# Patient Record
Sex: FEMALE | Race: WHITE | NOT HISPANIC OR LATINO | Employment: OTHER | URBAN - METROPOLITAN AREA
[De-identification: names, ages, dates, MRNs, and addresses within clinical notes are randomized per-mention and may not be internally consistent; named-entity substitution may affect disease eponyms.]

---

## 2018-03-14 ENCOUNTER — OFFICE VISIT (OUTPATIENT)
Dept: INTERNAL MEDICINE | Facility: CLINIC | Age: 73
End: 2018-03-14

## 2018-03-14 VITALS
WEIGHT: 131 LBS | HEIGHT: 62 IN | RESPIRATION RATE: 16 BRPM | SYSTOLIC BLOOD PRESSURE: 126 MMHG | TEMPERATURE: 97.9 F | HEART RATE: 68 BPM | DIASTOLIC BLOOD PRESSURE: 68 MMHG | BODY MASS INDEX: 24.11 KG/M2

## 2018-03-14 DIAGNOSIS — Z00.00 HEALTHCARE MAINTENANCE: ICD-10-CM

## 2018-03-14 DIAGNOSIS — M41.9 SCOLIOSIS OF THORACOLUMBAR SPINE, UNSPECIFIED SCOLIOSIS TYPE: Primary | ICD-10-CM

## 2018-03-14 LAB
ARTICHOKE IGE QN: 81 MG/DL (ref 0–130)
BILIRUB BLD-MCNC: NEGATIVE MG/DL
CHOLEST SERPL-MCNC: 170 MG/DL (ref 0–200)
CLARITY, POC: CLEAR
COLOR UR: YELLOW
EXPIRATION DATE: NORMAL
GLUCOSE UR STRIP-MCNC: NEGATIVE MG/DL
HDLC SERPL-MCNC: 84 MG/DL (ref 40–60)
KETONES UR QL: NEGATIVE
LEUKOCYTE EST, POC: NEGATIVE
Lab: NORMAL
NITRITE UR-MCNC: NEGATIVE MG/ML
PH UR: 6.5 [PH] (ref 5–8)
PROT UR STRIP-MCNC: NEGATIVE MG/DL
RBC # UR STRIP: NEGATIVE /UL
SP GR UR: 1 (ref 1–1.03)
TRIGL SERPL-MCNC: 39 MG/DL (ref 0–150)
UROBILINOGEN UR QL: NORMAL

## 2018-03-14 PROCEDURE — 36415 COLL VENOUS BLD VENIPUNCTURE: CPT | Performed by: INTERNAL MEDICINE

## 2018-03-14 PROCEDURE — 80061 LIPID PANEL: CPT | Performed by: INTERNAL MEDICINE

## 2018-03-14 PROCEDURE — 99203 OFFICE O/P NEW LOW 30 MIN: CPT | Performed by: INTERNAL MEDICINE

## 2018-03-14 PROCEDURE — 81003 URINALYSIS AUTO W/O SCOPE: CPT | Performed by: INTERNAL MEDICINE

## 2018-03-14 RX ORDER — LUBIPROSTONE 24 UG/1
1 CAPSULE, GELATIN COATED ORAL 2 TIMES DAILY
COMMUNITY
Start: 2018-02-01

## 2018-03-14 RX ORDER — DOXAZOSIN 2 MG/1
1 TABLET ORAL NIGHTLY
COMMUNITY
Start: 2018-02-06 | End: 2018-08-27 | Stop reason: SDUPTHER

## 2018-03-14 RX ORDER — GABAPENTIN 100 MG/1
CAPSULE ORAL
COMMUNITY
Start: 2018-01-16 | End: 2022-04-11

## 2018-03-14 RX ORDER — PANTOPRAZOLE SODIUM 40 MG/1
1 TABLET, DELAYED RELEASE ORAL DAILY
COMMUNITY
Start: 2018-03-01 | End: 2019-04-23 | Stop reason: SDUPTHER

## 2018-03-14 RX ORDER — MONTELUKAST SODIUM 10 MG/1
1 TABLET ORAL DAILY PRN
COMMUNITY
Start: 2018-01-15 | End: 2018-09-04 | Stop reason: SDUPTHER

## 2018-03-19 NOTE — PROGRESS NOTES
Chief Complaint   Patient presents with   • NEW PATIENT, ESTABLISH CARE       History of Present Illness    The patient presents for follow-up of mid back pain. She reports that the pain is worsened. The current treatment consists of no specific therapies. She has a history of scoliosis.   She does not have associated fever, chills, weight loss, rashes, dysuria, hematuria, or a decreased urine stream. The pain does not radiate. There is no numbness. The patient denies loss of bladder control. The patient denies loss of bowel control. The patient has attempted heat, ice, exercise, stretches and Non-Steroidals. The heat did give pain relief. The ice did give pain relief. The exercise and stretching did give pain relief. The Non Steroidal AntiInflammatories did give relief of the pain. The patient reports no aggravating factors.    Review of Systems    GENERAL- Denies Fever, Chills, Sweats, Weakness or Malaise.    HEENT- Denies Eye Pain, Eye Drainage, Nasal Discharge, Sore Throat, Ear Pain, Ear Drainage, Headache, Decreased Vision, Sinus Pain, Nasal Congestion, Decreased Hearing, Visual Disturbance, Diplopia or Tinnitus.    GASTROINTESTINAL- Denies Abdominal Pain, Nausea, Vomiting, Diarrhea, Blood per Rectum, Constipation or Heartburn.    PULMONARY- Denies Wheezing, Dyspnea, Sputum Production, Cough, Hemoptysis or Pleuritic Chest Pain.    ENDOCRINE- Denies Cold Intolerance, Depression, Edema, Fatigue, Hair Loss, Heat Intolerance, Memory Loss, Palpitations, Polydypsia, Polyphagia, Polyuria, Sleep Disturbance, Weight Gain or Weight Loss.    PSYCHIATRIC- Denies Anxiety, Loneliness, Tearfulness, Hopelessness or Suicidal Ideation.    Medications      Current Outpatient Prescriptions:   •  acetaminophen (TYLENOL) 650 MG 8 hr tablet, Take 650 mg by mouth every 8 (eight) hours as needed for mild pain (1-3)., Disp: , Rfl:   •  AMITIZA 24 MCG capsule, Take 1 capsule by mouth 2 (Two) Times a Day., Disp: , Rfl:   •   Calcium-Magnesium-Vitamin D (CALCIUM MAGNESIUM PO), Take  by mouth., Disp: , Rfl:   •  DHA-EPA-Vitamin E (OMEGA-3 COMPLEX PO), Take 1 capsule by mouth Daily., Disp: , Rfl:   •  diclofenac (VOLTAREN) 1 % gel gel, Apply 4 g topically 4 (four) times a day as needed., Disp: , Rfl:   •  Dietary Management Product (VASCULERA PO), Take 1 tablet by mouth Daily., Disp: , Rfl:   •  Diphenhydramine-APAP, sleep, (TYLENOL PM EXTRA STRENGTH PO), Take 1 tablet by mouth At Night As Needed., Disp: , Rfl:   •  doxazosin (CARDURA) 2 MG tablet, Take 1 tablet by mouth Every Night., Disp: , Rfl:   •  famotidine (PEPCID) 20 MG tablet, Take 20 mg by mouth 2 (Two) Times a Day As Needed., Disp: , Rfl:   •  fluticasone (FLONASE) 50 MCG/ACT nasal spray, 2 sprays into each nostril Daily As Needed., Disp: , Rfl:   •  gabapentin (NEURONTIN) 100 MG capsule, TAKE 2 CAPSULES AT NOON AND 3 CAPSULES AT HS, Disp: , Rfl:   •  Loratadine-Pseudoephedrine (CLARITIN-D 12 HOUR PO), Take 1 tablet by mouth Daily As Needed., Disp: , Rfl:   •  montelukast (SINGULAIR) 10 MG tablet, Take 1 tablet by mouth Daily As Needed., Disp: , Rfl:   •  pantoprazole (PROTONIX) 40 MG EC tablet, Take 1 tablet by mouth Daily., Disp: , Rfl:   •  polyethylene glycol (MIRALAX) powder, Take 17 g by mouth Daily As Needed (constipation)., Disp: , Rfl:   •  Probiotic Product (PROBIOTIC DAILY PO), Take 1 tablet by mouth Daily., Disp: , Rfl:   •  Raloxifene HCl (EVISTA PO), Take 1 tablet by mouth Daily., Disp: , Rfl:   •  VITAMIN D, CHOLECALCIFEROL, PO, Take  by mouth., Disp: , Rfl:   •  Wheat Dextrin (BENEFIBER DRINK MIX PO), Take  by mouth., Disp: , Rfl:      Allergies    Allergies   Allergen Reactions   • Codeine GI Intolerance       Problem List    Patient Active Problem List   Diagnosis   • Rectal pain   • S/P hemorrhoidectomy 11/1/16   • Urinary retention   • Acute urinary retention       Medications, Allergies, Problems List and Past History were reviewed and updated.    Physical  "Examination    /68 (BP Location: Left arm, Patient Position: Sitting, Cuff Size: Adult)   Pulse 68   Temp 97.9 °F (36.6 °C) (Temporal Artery )   Resp 16   Ht 156.2 cm (61.5\")   Wt 59.4 kg (131 lb)   LMP  (LMP Unknown) Comment: LAST PAP 2015  Breastfeeding? No   BMI 24.35 kg/m²     HEENT: Head- Normocephalic Atraumatic. Facies- Within normal limits. Pinnas- Normal texture and shape bilaterally. Canals- Normal bilaterally. TMs- Normal bilaterally. Nares- Patent bilaterally. Nasal Septum- is normal. There is no tenderness to palpation over the frontal or maxillary sinuses. Lids- Normal bilaterally. Conjunctiva- Clear bilaterally. Sclera- Anicteric bilaterally. Oropharynx- Moist with no lesions. Tonsils- No enlargement, erythema or exudate.    Lungs: Auscultation- Clear to auscultation bilaterally. There are no retractions, clubbing or cyanosis. The Expiratory to Inspiratory ratio is equal.    Cardiovascular: There are no carotid bruits. Heart- Normal Rate with Regular rhythm and no murmurs. There are no gallops. There are no rubs. In the lower extremities there is no edema. The upper extremities do not have edema.    Abdomen: Soft, benign, non-tender with no masses, hernias, organomegaly or scars.    Back: The patient has a normal spinal curvature with no evidence of scoliosis. There are no skin lesions noted on the back. Palpation reveals no tenderness and normal muscle tone. The straight leg raising test is negative. The back has normal flexion, extension, rotation, and lateral bending.    Lower Extremity Neuro Exam: Muscle Strength is 5/5 in all major lower extremity muscle groups. Deep Tendon Reflexes are 2+ and equal in the patellar and Achilles distributions bilaterally. Sensation is normal in all distributions.    Breast: Normal contours bilaterally with no masses, discharge, skin changes or lumps. There are no scars noted.    Genitourinary: The labia are within normal limits with no lesions. The " vaginal introitus is within normal limits. The vagina has a healthy mucosa with no lesions or discharge. The cervix is multiparous with no lesions. The bimanual examination reveals no adnexal masses or tenderness.    Impression and Assessment    Low Back Pain.    Gynecologic Exam.    Plan    Low Back Pain Plan: She was referred to physical therapy.    Gynecologic Exam Plan:  A pap smear was obtained.    The following was ordered for screening and health maintenance: Lipid Profile and U/A.    Brandi was seen today for new patient, establish care.    Diagnoses and all orders for this visit:    Scoliosis of thoracolumbar spine, unspecified scoliosis type  -     Ambulatory Referral to Physical Therapy Evaluate and treat    Healthcare maintenance  -     Lipid Panel  -     POC Urinalysis Dipstick, Automated        Return to Office    The patient was instructed to return for follow-up in 3 months.    The patient was instructed to return sooner if the condition changes, worsens, or doesn't resolve.

## 2018-04-18 ENCOUNTER — OFFICE VISIT (OUTPATIENT)
Dept: INTERNAL MEDICINE | Facility: CLINIC | Age: 73
End: 2018-04-18

## 2018-04-18 VITALS
WEIGHT: 128 LBS | TEMPERATURE: 97.3 F | RESPIRATION RATE: 18 BRPM | DIASTOLIC BLOOD PRESSURE: 64 MMHG | BODY MASS INDEX: 23.79 KG/M2 | SYSTOLIC BLOOD PRESSURE: 122 MMHG | HEART RATE: 80 BPM

## 2018-04-18 DIAGNOSIS — K62.89 RECTAL PAIN: ICD-10-CM

## 2018-04-18 DIAGNOSIS — K64.9 HEMORRHOIDS, UNSPECIFIED HEMORRHOID TYPE: ICD-10-CM

## 2018-04-18 DIAGNOSIS — L03.012 PARONYCHIA OF FINGER OF LEFT HAND: Primary | ICD-10-CM

## 2018-04-18 PROCEDURE — 99214 OFFICE O/P EST MOD 30 MIN: CPT | Performed by: INTERNAL MEDICINE

## 2018-04-18 RX ORDER — LIDOCAINE 5 G/100G
CREAM RECTAL; TOPICAL 4 TIMES DAILY PRN
COMMUNITY

## 2018-04-18 RX ORDER — SULFAMETHOXAZOLE AND TRIMETHOPRIM 800; 160 MG/1; MG/1
1 TABLET ORAL 2 TIMES DAILY
Qty: 20 TABLET | Refills: 0 | Status: SHIPPED | OUTPATIENT
Start: 2018-04-18 | End: 2018-06-14

## 2018-04-18 NOTE — PROGRESS NOTES
Chief Complaint   Patient presents with   • SORE, RED CUTICLE ON LEFT RING FINGER   • Rectal Pain       History of Present Illness      The patient presents with erythema, tenderness, swelling, warmth and pain around the fourth fingernail of the left hand. She denies chills, rigors, malaise or tachycardia. This has been present for 5 days. The patient denies a fever. She does not have diabetes, obesity, hyperhidrosis, immune defects or medication-induced immunosuppression. She does not chew her fingernails. This began after she was nicked at a nail salon while having her nails clipped.    The patient has a one month history of rectal pain. There is no history of gastrointestinal bleeding. The patient denies a change in bowel habits. The stool caliber is normal. The patient denies diarrhea. There has not been constipation. The patient has not had a fever. The patient denies emesis. The patient denies abdominal pain. There has not been any unexplained weight loss. The patient denies dysuria, pelvic pain or a rash. The patient reports hemorrhoids. There is no vaginal discharge. She is using 5% lidocaine ointment without relief. She has an appointment with her colorectal surgeon next week.    Medications      Current Outpatient Prescriptions:   •  acetaminophen (TYLENOL) 650 MG 8 hr tablet, Take 1,300 mg by mouth Every 8 (Eight) Hours As Needed for Mild Pain ., Disp: , Rfl:   •  AMITIZA 24 MCG capsule, Take 1 capsule by mouth 2 (Two) Times a Day., Disp: , Rfl:   •  Calcium-Magnesium-Vitamin D (CALCIUM MAGNESIUM PO), Take  by mouth., Disp: , Rfl:   •  custom compounded cream, Apply  topically. CUSTOM COMPOUNDED RECTAL SUPPOSITORIES BELLADONNA/MORPHINE SULF 16.2/5MG  INSERT 1 SUPPOSITORY TWICE DAILY AS NEEDED, Disp: , Rfl:   •  custom compounded cream, Apply  topically. CUSTOM COMPOUNDED RECTAL SUPPOSITORY HYDROCORTISONE/LIDOCAINE 2.5% / 2% INSERT 1 SUPPOSITORY TWICE DAILY AS NEEDED, Disp: , Rfl:   •  DHA-EPA-Vitamin E  (OMEGA-3 COMPLEX PO), Take 1 capsule by mouth Daily., Disp: , Rfl:   •  diclofenac (VOLTAREN) 1 % gel gel, Apply 4 g topically 4 (four) times a day as needed., Disp: , Rfl:   •  Dietary Management Product (VASCULERA PO), Take 1 tablet by mouth Daily., Disp: , Rfl:   •  Diphenhydramine-APAP, sleep, (TYLENOL PM EXTRA STRENGTH PO), Take 2 tablets by mouth At Night As Needed., Disp: , Rfl:   •  doxazosin (CARDURA) 2 MG tablet, Take 1 tablet by mouth Every Night., Disp: , Rfl:   •  famotidine (PEPCID) 20 MG tablet, Take 20 mg by mouth 2 (Two) Times a Day As Needed., Disp: , Rfl:   •  fluticasone (FLONASE) 50 MCG/ACT nasal spray, 2 sprays into each nostril Daily As Needed., Disp: , Rfl:   •  gabapentin (NEURONTIN) 100 MG capsule, TAKE 2 CAPSULES AT NOON AND 3 CAPSULES AT HS, Disp: , Rfl:   •  Lidocaine, Anorectal, (RECTICARE) 5 % cream cream, Apply  topically 4 (Four) Times a Day As Needed., Disp: , Rfl:   •  Loratadine-Pseudoephedrine (CLARITIN-D 12 HOUR PO), Take 1 tablet by mouth Daily As Needed., Disp: , Rfl:   •  montelukast (SINGULAIR) 10 MG tablet, Take 1 tablet by mouth Daily As Needed., Disp: , Rfl:   •  pantoprazole (PROTONIX) 40 MG EC tablet, Take 1 tablet by mouth Daily., Disp: , Rfl:   •  polyethylene glycol (MIRALAX) powder, Take 17 g by mouth Daily As Needed (constipation)., Disp: , Rfl:   •  Probiotic Product (PROBIOTIC DAILY PO), Take 1 tablet by mouth Daily., Disp: , Rfl:   •  Raloxifene HCl (EVISTA PO), Take 1 tablet by mouth Daily., Disp: , Rfl:   •  VITAMIN D, CHOLECALCIFEROL, PO, Take  by mouth., Disp: , Rfl:   •  Wheat Dextrin (BENEFIBER DRINK MIX PO), Take  by mouth., Disp: , Rfl:      Allergies    No Known Allergies    Problem List    Patient Active Problem List   Diagnosis   • Rectal pain   • S/P hemorrhoidectomy 11/1/16   • Urinary retention   • Acute urinary retention       Medications, Allergies, Problems List and Past History were reviewed and updated.    Physical Examination    /64 (BP  Location: Left arm, Patient Position: Sitting, Cuff Size: Adult)   Pulse 80   Temp 97.3 °F (36.3 °C) (Temporal Artery )   Resp 18   Wt 58.1 kg (128 lb)   LMP  (LMP Unknown) Comment: LAST PAP 2015  Breastfeeding? No   BMI 23.79 kg/m²     Abdomen: Soft, benign, non-tender with no masses, hernias, organomegaly or scars.    Rectal: reveals normal external sphincter tone with thrombosed hemorrhoids.    Hands: The left hand has normal brian landmarks and no noted atrophy. There is no evidence of tenosynovitis. There is no tenderness noted in the hands. There are no ingrown nails. The nail beds are are noted to have erythema, tenderness, swelling, warmth and pain to palpation around the fourth fingernail of the left hand.    Impression and Assessment    Paronychia.    Rectal Pain.    Hemorrhoids.    Plan    Rectal Pain Plan: The patient was encouraged to take Sitz baths daily. She was instructed to use stool softeners daily. She will follow-up with her surgeon. Medication will be added as noted below.    Hemorrhoids Plan: The patient was encouraged to take Sitz baths daily. She was instructed to use stool softeners daily. Medication will be added as noted below.    Paronychia Plan: Medication was added as noted below.    Brandi was seen today for sore, red cuticle on left ring finger and rectal pain.    Diagnoses and all orders for this visit:    Paronychia of finger of left hand  -     sulfamethoxazole-trimethoprim (BACTRIM DS) 800-160 MG per tablet; Take 1 tablet by mouth 2 (Two) Times a Day.    Rectal pain  -     hydrocortisone (PROCTOCORT) 1 % cream rectal cream; Insert  into the rectum 2 (Two) Times a Day.    Hemorrhoids, unspecified hemorrhoid type  -     hydrocortisone (PROCTOCORT) 1 % cream rectal cream; Insert  into the rectum 2 (Two) Times a Day.        Return to Office    The patient was instructed to return for follow-up at the next scheduled visit.    The patient was instructed to return sooner if the  condition changes, worsens, or doesn't resolve.

## 2018-06-14 ENCOUNTER — OFFICE VISIT (OUTPATIENT)
Dept: INTERNAL MEDICINE | Facility: CLINIC | Age: 73
End: 2018-06-14

## 2018-06-14 VITALS
RESPIRATION RATE: 16 BRPM | SYSTOLIC BLOOD PRESSURE: 126 MMHG | DIASTOLIC BLOOD PRESSURE: 74 MMHG | TEMPERATURE: 98.1 F | HEART RATE: 72 BPM | WEIGHT: 131 LBS | BODY MASS INDEX: 24.35 KG/M2

## 2018-06-14 DIAGNOSIS — M81.0 OSTEOPOROSIS, UNSPECIFIED OSTEOPOROSIS TYPE, UNSPECIFIED PATHOLOGICAL FRACTURE PRESENCE: ICD-10-CM

## 2018-06-14 DIAGNOSIS — Z23 NEED FOR PNEUMOCOCCAL VACCINE: ICD-10-CM

## 2018-06-14 DIAGNOSIS — Z12.83 SKIN EXAM, SCREENING FOR CANCER: ICD-10-CM

## 2018-06-14 DIAGNOSIS — K21.9 GASTROESOPHAGEAL REFLUX DISEASE WITHOUT ESOPHAGITIS: Primary | ICD-10-CM

## 2018-06-14 DIAGNOSIS — Z12.11 SCREENING FOR COLON CANCER: ICD-10-CM

## 2018-06-14 DIAGNOSIS — K59.00 CONSTIPATION, UNSPECIFIED CONSTIPATION TYPE: ICD-10-CM

## 2018-06-14 PROCEDURE — 90670 PCV13 VACCINE IM: CPT | Performed by: INTERNAL MEDICINE

## 2018-06-14 PROCEDURE — 99214 OFFICE O/P EST MOD 30 MIN: CPT | Performed by: INTERNAL MEDICINE

## 2018-06-14 PROCEDURE — G0009 ADMIN PNEUMOCOCCAL VACCINE: HCPCS | Performed by: INTERNAL MEDICINE

## 2018-06-14 NOTE — PROGRESS NOTES
Chief Complaint   Patient presents with   • Follow-up     3 MONTH FOLLOW UP CHRONIC MEDICAL PROBLEMS       History of Present Illness    The patient presents for a follow-up related to GERD. The patient is not on medication for her gastroesophageal reflux. She reports no abdominal pain, belching, chest pain, diarrhea, dysphagia, early satiety, heartburn, hoarseness, nausea, odynophagia, rectal bleeding, vomiting or weight loss. The GERD has no known aggravating factors.    The patient presents for follow-up of osteoporosis. She reports no excessive caffeine intake. The patient does not smoke. The patient is currently taking a medication for osteoporosis. She does not take supplemental calcium. The last DEXA scan was in 2017 and revealed osteoporosis.    The patient presents for follow-up of constipation. The constipation has remained the same. The patient has been compliant with treatment as outlined at the last visit. The patient's bowel movements occur approximately every day. The stools are soft. Bowel Movements are not painful. There is no associated enuresis. The patient does not have abdominal pain. The patient denies neglecting the urge or putting off bowel movements.    Review of Systems    GENERAL/CONSTITUTIONAL- Denies Fever, Chills, Sweats, Fatigue, Weakness or Malaise.    CARDIOVASCULAR- Denies Claudication, Edema, Syncope or Palpitations.    GASTROINTESTINAL- Reports: Constipation..    PULMONARY- Denies Wheezing, Dyspnea, Sputum Production, Cough, Hemoptysis or Pleuritic Chest Pain.    MUSCULOSKELETAL- Denies Joint Pain, Joint Stiffness, Decreased Range of Motion, Joint Swelling or Erythema of Joints.    Medications      Current Outpatient Prescriptions:   •  acetaminophen (TYLENOL) 650 MG 8 hr tablet, Take 1,300 mg by mouth Every 8 (Eight) Hours As Needed for Mild Pain ., Disp: , Rfl:   •  AMITIZA 24 MCG capsule, Take 1 capsule by mouth 2 (Two) Times a Day., Disp: , Rfl:   •  Calcium-Magnesium-Vitamin  D (CALCIUM MAGNESIUM PO), Take  by mouth., Disp: , Rfl:   •  custom compounded cream, Apply  topically. CUSTOM COMPOUNDED RECTAL SUPPOSITORIES BELLADONNA/MORPHINE SULF 16.2/5MG  INSERT 1 SUPPOSITORY TWICE DAILY AS NEEDED, Disp: , Rfl:   •  custom compounded cream, Apply  topically. CUSTOM COMPOUNDED RECTAL SUPPOSITORY HYDROCORTISONE/LIDOCAINE 2.5% / 2% INSERT 1 SUPPOSITORY TWICE DAILY AS NEEDED, Disp: , Rfl:   •  DHA-EPA-Vitamin E (OMEGA-3 COMPLEX PO), Take 1 capsule by mouth Daily., Disp: , Rfl:   •  diclofenac (VOLTAREN) 1 % gel gel, Apply 4 g topically 4 (four) times a day as needed., Disp: , Rfl:   •  Dietary Management Product (VASCULERA PO), Take 1 tablet by mouth Daily., Disp: , Rfl:   •  Diphenhydramine-APAP, sleep, (TYLENOL PM EXTRA STRENGTH PO), Take 2 tablets by mouth At Night As Needed., Disp: , Rfl:   •  doxazosin (CARDURA) 2 MG tablet, Take 1 tablet by mouth Every Night., Disp: , Rfl:   •  famotidine (PEPCID) 20 MG tablet, Take 20 mg by mouth 2 (Two) Times a Day As Needed., Disp: , Rfl:   •  fluticasone (FLONASE) 50 MCG/ACT nasal spray, 2 sprays into each nostril Daily As Needed., Disp: , Rfl:   •  gabapentin (NEURONTIN) 100 MG capsule, TAKE 2 CAPSULES AT NOON AND 3 CAPSULES AT HS, Disp: , Rfl:   •  hydrocortisone (PROCTOCORT) 1 % cream rectal cream, Insert  into the rectum 2 (Two) Times a Day., Disp: 28.35 g, Rfl: 1  •  Lidocaine, Anorectal, (RECTICARE) 5 % cream cream, Apply  topically 4 (Four) Times a Day As Needed., Disp: , Rfl:   •  Loratadine-Pseudoephedrine (CLARITIN-D 12 HOUR PO), Take 1 tablet by mouth Daily As Needed., Disp: , Rfl:   •  montelukast (SINGULAIR) 10 MG tablet, Take 1 tablet by mouth Daily As Needed., Disp: , Rfl:   •  pantoprazole (PROTONIX) 40 MG EC tablet, Take 1 tablet by mouth Daily., Disp: , Rfl:   •  polyethylene glycol (MIRALAX) powder, Take 17 g by mouth Daily As Needed (constipation)., Disp: , Rfl:   •  Probiotic Product (PROBIOTIC DAILY PO), Take 1 tablet by mouth  Daily., Disp: , Rfl:   •  Raloxifene HCl (EVISTA PO), Take 1 tablet by mouth Daily., Disp: , Rfl:   •  VITAMIN D, CHOLECALCIFEROL, PO, Take  by mouth., Disp: , Rfl:   •  Wheat Dextrin (BENEFIBER DRINK MIX PO), Take  by mouth., Disp: , Rfl:      Allergies    No Known Allergies    Problem List    Patient Active Problem List   Diagnosis   • Rectal pain   • S/P hemorrhoidectomy 11/1/16   • Urinary retention   • Acute urinary retention       Medications, Allergies, Problems List and Past History were reviewed and updated.    Physical Examination    /74 (BP Location: Left arm, Patient Position: Sitting, Cuff Size: Adult)   Pulse 72   Temp 98.1 °F (36.7 °C) (Temporal Artery )   Resp 16   Wt 59.4 kg (131 lb)   LMP  (LMP Unknown) Comment: LAST PAP 2015  Breastfeeding? No   BMI 24.35 kg/m²     HEENT: Head- Normocephalic Atraumatic. Facies- Within normal limits. Pinnas- Normal texture and shape bilaterally. Canals- Normal bilaterally. TMs- Normal bilaterally. Nares- Patent bilaterally. Nasal Septum- is normal. There is no tenderness to palpation over the frontal or maxillary sinuses. Lids- Normal bilaterally. Conjunctiva- Clear bilaterally. Sclera- Anicteric bilaterally. Oropharynx- Moist with no lesions. Tonsils- No enlargement, erythema or exudate.    Neck: Thyroid- non enlarged, symmetric and has no nodules. No bruits are detected. ROM- Normal Range of Motion with no rigidity.    Lungs: Auscultation- Clear to auscultation bilaterally. There are no retractions, clubbing or cyanosis. The Expiratory to Inspiratory ratio is equal.    Cardiovascular: There are no carotid bruits. Heart- Normal Rate with Regular rhythm and no murmurs. There are no gallops. There are no rubs. In the lower extremities there is no edema. The upper extremities do not have edema.    Abdomen: Soft, benign, non-tender with no masses, hernias, organomegaly or scars.    Impression and Assessment    Encounter for Immunization  Administration.    Gastroesophageal Reflux Disease.    Osteoporosis.    Constipation.    Plan    Gastroesophageal Reflux Disease Plan: The current plan was continued.    Constipation Plan: The current plan was continued.    Osteoporosis Plan: The current plan was continued.    Counseled regarding immunizations and applicable VIS given.    Immunizations Ordered and Administered: Prevnar 13.    Brandi was seen today for follow-up.    Diagnoses and all orders for this visit:    Gastroesophageal reflux disease without esophagitis    Skin exam, screening for cancer  -     Ambulatory Referral to Dermatology    Osteoporosis, unspecified osteoporosis type, unspecified pathological fracture presence  -     DEXA Bone Density Axial    Need for pneumococcal vaccine  -     Pneumococcal Conjugate Vaccine 13-Valent All (PCV13)    Screening for colon cancer  -     Ambulatory Referral For Screening Colonoscopy    Constipation, unspecified constipation type        Return to Office    The patient was instructed to return for follow-up in 3 months.    The patient was instructed to return sooner if the condition changes, worsens, or doesn't resolve.

## 2018-06-15 ENCOUNTER — TELEPHONE (OUTPATIENT)
Dept: INTERNAL MEDICINE | Facility: CLINIC | Age: 73
End: 2018-06-15

## 2018-06-15 NOTE — TELEPHONE ENCOUNTER
Unsure of future date  7/7/2018 date for DEXA scan, but mammo and colonoscopy updated in .     ----- Message from Christy Sun sent at 6/15/2018  8:57 AM EDT -----  Contact: SELF  WILL SADLER CALLING TO LET DR ROMERO KNOW HER LAST COLONOSCOPY WAS 1/12/13, DEXA SCAN WAS 7/7/18 AND HER LAST MAMMO WAS 7/6/17.  WILL CAN BE REACHED [phone]    SHE IS AWARE DR ROMERO IS NOT IN TODAY

## 2018-07-05 ENCOUNTER — TELEPHONE (OUTPATIENT)
Dept: INTERNAL MEDICINE | Facility: CLINIC | Age: 73
End: 2018-07-05

## 2018-07-05 DIAGNOSIS — Z12.39 BREAST CANCER SCREENING: Primary | ICD-10-CM

## 2018-07-06 NOTE — TELEPHONE ENCOUNTER
----- Message from Gisselle Ochoa sent at 7/5/2018 12:03 PM EDT -----  Patient was supposed to have order for mammogram, and order was never put in. Can you enter order?  Thanks

## 2018-07-09 ENCOUNTER — HOSPITAL ENCOUNTER (OUTPATIENT)
Dept: BONE DENSITY | Facility: HOSPITAL | Age: 73
Discharge: HOME | End: 2018-07-09
Attending: INTERNAL MEDICINE | Admitting: INTERNAL MEDICINE

## 2018-07-09 PROCEDURE — 77080 DXA BONE DENSITY AXIAL: CPT

## 2018-08-20 ENCOUNTER — APPOINTMENT (OUTPATIENT)
Dept: MAMMOGRAPHY | Facility: HOSPITAL | Age: 73
End: 2018-08-20
Attending: INTERNAL MEDICINE

## 2018-08-20 ENCOUNTER — HOSPITAL ENCOUNTER (OUTPATIENT)
Dept: MAMMOGRAPHY | Facility: HOSPITAL | Age: 73
Discharge: HOME | End: 2018-08-20
Attending: INTERNAL MEDICINE | Admitting: INTERNAL MEDICINE

## 2018-08-20 ENCOUNTER — APPOINTMENT (OUTPATIENT)
Dept: OTHER | Facility: HOSPITAL | Age: 73
End: 2018-08-20
Attending: INTERNAL MEDICINE

## 2018-08-20 DIAGNOSIS — Z92.89 H/O MAMMOGRAM: ICD-10-CM

## 2018-08-20 DIAGNOSIS — Z12.39 BREAST CANCER SCREENING: ICD-10-CM

## 2018-08-20 PROCEDURE — 77067 SCR MAMMO BI INCL CAD: CPT | Performed by: RADIOLOGY

## 2018-08-20 PROCEDURE — 77063 BREAST TOMOSYNTHESIS BI: CPT

## 2018-08-20 PROCEDURE — 77067 SCR MAMMO BI INCL CAD: CPT

## 2018-08-20 PROCEDURE — 77063 BREAST TOMOSYNTHESIS BI: CPT | Performed by: RADIOLOGY

## 2018-08-27 ENCOUNTER — TELEPHONE (OUTPATIENT)
Dept: INTERNAL MEDICINE | Facility: CLINIC | Age: 73
End: 2018-08-27

## 2018-08-27 DIAGNOSIS — R32 URINARY INCONTINENCE, UNSPECIFIED TYPE: Primary | ICD-10-CM

## 2018-08-27 RX ORDER — DOXAZOSIN 2 MG/1
2 TABLET ORAL NIGHTLY
Qty: 90 TABLET | Refills: 1 | Status: SHIPPED | OUTPATIENT
Start: 2018-08-27 | End: 2019-02-19 | Stop reason: SDUPTHER

## 2018-08-27 NOTE — TELEPHONE ENCOUNTER
Please call in 6 month supply (Either 1 month with RF 5 or 3 months with RF 1).  Abram Myers MD  1:57 PM  08/27/18

## 2018-08-27 NOTE — TELEPHONE ENCOUNTER
----- Message from Christy Sun sent at 8/27/2018 10:59 AM EDT -----  Contact: SELF  WILL SADLER CALLING FOR A REFILL FOR DOXAZOCIN 2MG. SHE SAID SHE WAS PRESCRIBED THIS BY A UROLOGIST TO HELP RELAX HER BLADDER. SHE ALSO WANTS TO KNOW IF YOU COULD GET HER A REFERRAL FOR UROLOGY. SHE USES THE [facility]. WILL CAN BE REACHED [phone]

## 2018-09-04 ENCOUNTER — TELEPHONE (OUTPATIENT)
Dept: INTERNAL MEDICINE | Facility: CLINIC | Age: 73
End: 2018-09-04

## 2018-09-04 RX ORDER — MONTELUKAST SODIUM 10 MG/1
10 TABLET ORAL DAILY PRN
Qty: 90 TABLET | Refills: 1 | Status: SHIPPED | OUTPATIENT
Start: 2018-09-04 | End: 2019-04-23 | Stop reason: SDUPTHER

## 2018-09-04 NOTE — TELEPHONE ENCOUNTER
Please call in 6 month supply (Either 1 month with RF 5 or 3 months with RF 1).  Abram Myers MD  1:39 PM  09/04/18

## 2018-09-04 NOTE — TELEPHONE ENCOUNTER
----- Message from Joy Odell MA sent at 9/4/2018  9:50 AM EDT -----  Patient called requesting refill on Montelukast 10mg and diclofenac gel sent to [facility]     [phone] patient call back

## 2018-09-17 ENCOUNTER — TELEPHONE (OUTPATIENT)
Dept: INTERNAL MEDICINE | Facility: CLINIC | Age: 73
End: 2018-09-17

## 2018-09-17 RX ORDER — RALOXIFENE HYDROCHLORIDE 60 MG/1
60 TABLET, FILM COATED ORAL DAILY
Qty: 90 TABLET | Refills: 1 | Status: SHIPPED | OUTPATIENT
Start: 2018-09-17 | End: 2019-04-02 | Stop reason: SDUPTHER

## 2018-09-17 NOTE — TELEPHONE ENCOUNTER
----- Message from Brandie Flores sent at 9/14/2018 11:18 AM EDT -----  PATIENT IS NEEDING A 90 DAY REFILL ON Raloxifene HCl (EVISTA PO) 60 MG     [facility] - [address] [phone] [facility] [phone]     PATIENT CALL BACK : [phone]    THANK YOU

## 2018-09-20 ENCOUNTER — OFFICE VISIT (OUTPATIENT)
Dept: INTERNAL MEDICINE | Facility: CLINIC | Age: 73
End: 2018-09-20

## 2018-09-20 VITALS
BODY MASS INDEX: 24.17 KG/M2 | RESPIRATION RATE: 16 BRPM | TEMPERATURE: 98.2 F | DIASTOLIC BLOOD PRESSURE: 64 MMHG | WEIGHT: 130 LBS | HEART RATE: 72 BPM | SYSTOLIC BLOOD PRESSURE: 106 MMHG

## 2018-09-20 DIAGNOSIS — K21.9 GASTROESOPHAGEAL REFLUX DISEASE WITHOUT ESOPHAGITIS: Primary | ICD-10-CM

## 2018-09-20 DIAGNOSIS — M19.91 PRIMARY OSTEOARTHRITIS, UNSPECIFIED SITE: ICD-10-CM

## 2018-09-20 DIAGNOSIS — M85.80 OSTEOPENIA, UNSPECIFIED LOCATION: ICD-10-CM

## 2018-09-20 DIAGNOSIS — K59.00 CONSTIPATION, UNSPECIFIED CONSTIPATION TYPE: ICD-10-CM

## 2018-09-20 LAB
25(OH)D3 SERPL-MCNC: 52.4 NG/ML
ALBUMIN SERPL-MCNC: 4.24 G/DL (ref 3.2–4.8)
ALBUMIN/GLOB SERPL: 1.8 G/DL (ref 1.5–2.5)
ALP SERPL-CCNC: 41 U/L (ref 25–100)
ALT SERPL W P-5'-P-CCNC: 13 U/L (ref 7–40)
ANION GAP SERPL CALCULATED.3IONS-SCNC: 6 MMOL/L (ref 3–11)
AST SERPL-CCNC: 24 U/L (ref 0–33)
BILIRUB SERPL-MCNC: 0.4 MG/DL (ref 0.3–1.2)
BUN BLD-MCNC: 12 MG/DL (ref 9–23)
BUN/CREAT SERPL: 16.7 (ref 7–25)
CALCIUM SPEC-SCNC: 9.1 MG/DL (ref 8.7–10.4)
CHLORIDE SERPL-SCNC: 100 MMOL/L (ref 99–109)
CO2 SERPL-SCNC: 28 MMOL/L (ref 20–31)
CREAT BLD-MCNC: 0.72 MG/DL (ref 0.6–1.3)
GFR SERPL CREATININE-BSD FRML MDRD: 80 ML/MIN/1.73
GLOBULIN UR ELPH-MCNC: 2.4 GM/DL
GLUCOSE BLD-MCNC: 86 MG/DL (ref 70–100)
POTASSIUM BLD-SCNC: 4.2 MMOL/L (ref 3.5–5.5)
PROT SERPL-MCNC: 6.6 G/DL (ref 5.7–8.2)
SODIUM BLD-SCNC: 134 MMOL/L (ref 132–146)
TSH SERPL DL<=0.05 MIU/L-ACNC: 1.29 MIU/ML (ref 0.35–5.35)

## 2018-09-20 PROCEDURE — 99214 OFFICE O/P EST MOD 30 MIN: CPT | Performed by: INTERNAL MEDICINE

## 2018-09-20 PROCEDURE — 82306 VITAMIN D 25 HYDROXY: CPT | Performed by: INTERNAL MEDICINE

## 2018-09-20 PROCEDURE — 36415 COLL VENOUS BLD VENIPUNCTURE: CPT | Performed by: INTERNAL MEDICINE

## 2018-09-20 PROCEDURE — 84443 ASSAY THYROID STIM HORMONE: CPT | Performed by: INTERNAL MEDICINE

## 2018-09-20 PROCEDURE — 80053 COMPREHEN METABOLIC PANEL: CPT | Performed by: INTERNAL MEDICINE

## 2018-09-20 RX ORDER — TRAMADOL HYDROCHLORIDE 50 MG/1
50 TABLET ORAL DAILY PRN
Qty: 30 TABLET | Refills: 3 | Status: SHIPPED | OUTPATIENT
Start: 2018-09-20 | End: 2020-01-07

## 2018-09-20 NOTE — PROGRESS NOTES
Chief Complaint   Patient presents with   • Follow-up     3 MONTH FOLLOW UP CHRONIC MEDICAL PROBLEMS       History of Present Illness    The patient presents with pain in her joints of a chronic duration. There is no history of trauma. The patient has no joint swelling. There is stiffness. The stiffness is present most of the time. The patient denies a history of overuse or repetitive motion with the affected joints.    The patient denies dry eyes, shortness of breath, fevers, cough, dry mouth, hematuria, headaches or abdominal pain. There are no associated insect bites. There is no family history of rheumatoid arthritis, juvenile rheumatoid arthritis, systemic lupus erythematosis or gout. The patient denies a personal history of malignancy.    The joint pain is aggravated by motion. The pain has no alleviating factors noted.     The patient presents for a follow-up related to GERD. The patient is not on medication for her gastroesophageal reflux. She reports no belching, chest pain, diarrhea, dysphagia, early satiety, heartburn, hoarseness, nausea, odynophagia, rectal bleeding, vomiting or weight loss. The GERD has no known aggravating factors.    The patient presents for follow-up of osteopenia. She reports no excessive caffeine intake. The patient does not smoke. The patient is currently taking a medication for osteoporosis. She takes adequate Vitamin D and calcium and is on Evista. Her recent DXA was improved.    The patient presents for follow-up of constipation. The constipation has remained the same. The patient has been compliant with treatment as outlined at the last visit. The patient's bowel movements occur approximately every day. The stools are soft. Bowel Movements are not painful. There is no associated enuresis. The patient does not have abdominal pain. The patient denies neglecting the urge or putting off bowel movements.    Review of Systems    GENERAL/CONSTITUTIONAL- Denies Chills, Sweats,  Fatigue, Weakness or Malaise.    HEENT- Denies Eye Pain, Eye Drainage, Nasal Discharge, Sore Throat, Ear Pain, Ear Drainage, Decreased Vision, Sinus Pain, Nasal Congestion, Decreased Hearing, Visual Disturbance, Diplopia or Tinnitus.    CARDIOVASCULAR- Denies Claudication, Edema, Syncope or Palpitations.    GASTROINTESTINAL- Reports: Constipation..    PULMONARY- Denies Wheezing, Sputum Production, Hemoptysis or Pleuritic Chest Pain.    MUSCULOSKELETAL- Reports: Joint Pain. Denies: Joint Stiffness, Decreased Range of Motion or Joint Swelling.    Medications      Current Outpatient Prescriptions:   •  acetaminophen (TYLENOL) 650 MG 8 hr tablet, Take 1,300 mg by mouth Every 8 (Eight) Hours As Needed for Mild Pain ., Disp: , Rfl:   •  AMITIZA 24 MCG capsule, Take 1 capsule by mouth 2 (Two) Times a Day., Disp: , Rfl:   •  Calcium-Magnesium-Vitamin D (CALCIUM MAGNESIUM PO), Take  by mouth., Disp: , Rfl:   •  custom compounded cream, Apply  topically. CUSTOM COMPOUNDED RECTAL SUPPOSITORIES BELLADONNA/MORPHINE SULF 16.2/5MG  INSERT 1 SUPPOSITORY TWICE DAILY AS NEEDED, Disp: , Rfl:   •  custom compounded cream, Apply  topically. CUSTOM COMPOUNDED RECTAL SUPPOSITORY HYDROCORTISONE/LIDOCAINE 2.5% / 2% INSERT 1 SUPPOSITORY TWICE DAILY AS NEEDED, Disp: , Rfl:   •  DHA-EPA-Vitamin E (OMEGA-3 COMPLEX PO), Take 1 capsule by mouth Daily., Disp: , Rfl:   •  diclofenac (VOLTAREN) 1 % gel gel, Apply 4 g topically to the appropriate area as directed 4 (Four) Times a Day As Needed (PAIN)., Disp: 100 g, Rfl: 5  •  Dietary Management Product (VASCULERA PO), Take 1 tablet by mouth Daily., Disp: , Rfl:   •  Diphenhydramine-APAP, sleep, (TYLENOL PM EXTRA STRENGTH PO), Take 2 tablets by mouth At Night As Needed., Disp: , Rfl:   •  doxazosin (CARDURA) 2 MG tablet, Take 1 tablet by mouth Every Night., Disp: 90 tablet, Rfl: 1  •  famotidine (PEPCID) 20 MG tablet, Take 20 mg by mouth 2 (Two) Times a Day As Needed., Disp: , Rfl:   •  fluticasone  (FLONASE) 50 MCG/ACT nasal spray, 2 sprays into each nostril Daily As Needed., Disp: , Rfl:   •  gabapentin (NEURONTIN) 100 MG capsule, TAKE 2 CAPSULES AT NOON AND 3 CAPSULES AT HS, Disp: , Rfl:   •  Lidocaine, Anorectal, (RECTICARE) 5 % cream cream, Apply  topically 4 (Four) Times a Day As Needed., Disp: , Rfl:   •  Loratadine-Pseudoephedrine (CLARITIN-D 12 HOUR PO), Take 1 tablet by mouth Daily As Needed., Disp: , Rfl:   •  montelukast (SINGULAIR) 10 MG tablet, Take 1 tablet by mouth Daily As Needed (ALLERGIES)., Disp: 90 tablet, Rfl: 1  •  pantoprazole (PROTONIX) 40 MG EC tablet, Take 1 tablet by mouth Daily., Disp: , Rfl:   •  polyethylene glycol (MIRALAX) powder, Take 17 g by mouth Daily As Needed (constipation)., Disp: , Rfl:   •  Probiotic Product (PROBIOTIC DAILY PO), Take 1 tablet by mouth Daily., Disp: , Rfl:   •  raloxifene (EVISTA) 60 MG tablet, Take 1 tablet by mouth Daily., Disp: 90 tablet, Rfl: 1  •  VITAMIN D, CHOLECALCIFEROL, PO, Take  by mouth., Disp: , Rfl:   •  Wheat Dextrin (BENEFIBER DRINK MIX PO), Take  by mouth., Disp: , Rfl:      Allergies    No Known Allergies    Problem List    Patient Active Problem List   Diagnosis   • Rectal pain   • S/P hemorrhoidectomy 11/1/16   • Urinary retention   • Acute urinary retention       Medications, Allergies, Problems List and Past History were reviewed and updated.    Physical Examination    /64 (BP Location: Left arm, Patient Position: Sitting, Cuff Size: Adult)   Pulse 72   Temp 98.2 °F (36.8 °C) (Temporal Artery )   Resp 16   Wt 59 kg (130 lb)   LMP  (LMP Unknown) Comment: LAST PAP 2015  Breastfeeding? No   BMI 24.17 kg/m²     HEENT: Head- Normocephalic Atraumatic. Facies- Within normal limits. Pinnas- Normal texture and shape bilaterally. Canals- Normal bilaterally. TMs- Normal bilaterally. Nares- Patent bilaterally. Nasal Septum- is normal. There is no tenderness to palpation over the frontal or maxillary sinuses. Lids- Normal  bilaterally. Conjunctiva- Clear bilaterally. Sclera- Anicteric bilaterally. Oropharynx- Moist with no lesions. Tonsils- No enlargement, erythema or exudate.    Neck: Thyroid- non enlarged, symmetric and has no nodules. No bruits are detected. ROM- Normal Range of Motion with no rigidity.    Lungs: Auscultation- Clear to auscultation bilaterally. There are no retractions, clubbing or cyanosis. The Expiratory to Inspiratory ratio is equal.    Cardiovascular: There are no carotid bruits. Heart- Normal Rate with Regular rhythm and no murmurs. There are no gallops. There are no rubs. In the lower extremities there is no edema. The upper extremities do not have edema.    Abdomen: Soft, benign, non-tender with no masses, hernias, organomegaly or scars.    Impression and Assessment    Gastroesophageal Reflux Disease.    Osteoarthritis.    Osteopenia.    Constipation.    Plan    Gastroesophageal Reflux Disease Plan: The condition is stable. No change is needed in the current plan.    Constipation Plan: The current plan was continued.    Osteopenia Plan: The current plan was continued.    Osteoarthritis Plan: Medication was added as noted below.      Return to Office    The patient was instructed to return for follow-up in 4 months.    The patient was instructed to return sooner if the condition changes, worsens, or doesn't resolve.

## 2018-10-01 ENCOUNTER — TELEPHONE (OUTPATIENT)
Dept: INTERNAL MEDICINE | Facility: CLINIC | Age: 73
End: 2018-10-01

## 2018-10-01 NOTE — TELEPHONE ENCOUNTER
----- Message from Christy Sun sent at 9/20/2018  2:33 PM EDT -----  Contact: SELF  WILL SADLER WAS TOLD BY DR ROMERO TO TALK TO YOU REGARDING A BILLING ISSUE. SHE SAID IT MAY BE A CODING ERROR. SHE HAS ALREADY SPOKEN TO THE BUSINESS OFFICE. SHE CAN BE REACHED -[phone]

## 2018-10-08 NOTE — TELEPHONE ENCOUNTER
S/W Eileen @ [phone] and requested that the lipid panel from 3-14-18 be refiled with diagnosis code Z13.6 (screening for cardiovascular dx).  She has sent the lipid test for a coding review.  Informed patient who verbalized understanding and appreciation.

## 2018-11-07 ENCOUNTER — TELEPHONE (OUTPATIENT)
Dept: INTERNAL MEDICINE | Facility: CLINIC | Age: 73
End: 2018-11-07

## 2018-11-07 DIAGNOSIS — G89.29 CHRONIC LOW BACK PAIN WITHOUT SCIATICA, UNSPECIFIED BACK PAIN LATERALITY: Primary | ICD-10-CM

## 2018-11-07 DIAGNOSIS — M54.50 CHRONIC LOW BACK PAIN WITHOUT SCIATICA, UNSPECIFIED BACK PAIN LATERALITY: Primary | ICD-10-CM

## 2018-11-07 NOTE — TELEPHONE ENCOUNTER
----- Message from Brandie Flores sent at 11/7/2018 10:44 AM EST -----  PATIENT IS NEEDING A REFERRAL TO A CHIROPRACTOR AND IS WANTING TO GO TO [facility] FOR SCOLIOSIS    PLEASE CALL PATIENT AT : [phone]    THANK YOU

## 2018-11-08 NOTE — TELEPHONE ENCOUNTER
Faxed order to [facility] chi[facility] f. [phone]  called pt and informed her order was sent over, she will schedule appt

## 2018-12-14 ENCOUNTER — OFFICE VISIT (OUTPATIENT)
Dept: INTERNAL MEDICINE | Facility: CLINIC | Age: 73
End: 2018-12-14

## 2018-12-14 ENCOUNTER — TELEPHONE (OUTPATIENT)
Dept: INTERNAL MEDICINE | Facility: CLINIC | Age: 73
End: 2018-12-14

## 2018-12-14 VITALS
TEMPERATURE: 97.5 F | HEART RATE: 81 BPM | WEIGHT: 132.6 LBS | HEIGHT: 61 IN | RESPIRATION RATE: 18 BRPM | SYSTOLIC BLOOD PRESSURE: 110 MMHG | BODY MASS INDEX: 25.04 KG/M2 | OXYGEN SATURATION: 94 % | DIASTOLIC BLOOD PRESSURE: 68 MMHG

## 2018-12-14 DIAGNOSIS — D64.9 ANEMIA, UNSPECIFIED TYPE: ICD-10-CM

## 2018-12-14 DIAGNOSIS — K92.1 HEMATOCHEZIA: Primary | ICD-10-CM

## 2018-12-14 LAB
BASOPHILS # BLD AUTO: 0.02 10*3/MM3 (ref 0–0.2)
BASOPHILS NFR BLD AUTO: 0.5 % (ref 0–1)
DEPRECATED RDW RBC AUTO: 44.9 FL (ref 37–54)
EOSINOPHIL # BLD AUTO: 0.01 10*3/MM3 (ref 0–0.3)
EOSINOPHIL NFR BLD AUTO: 0.2 % (ref 0–3)
ERYTHROCYTE [DISTWIDTH] IN BLOOD BY AUTOMATED COUNT: 13.1 % (ref 11.3–14.5)
EXPIRATION DATE: NORMAL
HCT VFR BLD AUTO: 34.2 % (ref 34.5–44)
HGB BLD-MCNC: 11.2 G/DL (ref 11.5–15.5)
HGB BLDA-MCNC: 9.7 G/DL
IMM GRANULOCYTES # BLD: 0.01 10*3/MM3 (ref 0–0.03)
IMM GRANULOCYTES NFR BLD: 0.2 % (ref 0–0.6)
LYMPHOCYTES # BLD AUTO: 1.09 10*3/MM3 (ref 0.6–4.8)
LYMPHOCYTES NFR BLD AUTO: 26.3 % (ref 24–44)
Lab: NORMAL
MCH RBC QN AUTO: 30.4 PG (ref 27–31)
MCHC RBC AUTO-ENTMCNC: 32.7 G/DL (ref 32–36)
MCV RBC AUTO: 92.9 FL (ref 80–99)
MONOCYTES # BLD AUTO: 0.5 10*3/MM3 (ref 0–1)
MONOCYTES NFR BLD AUTO: 12 % (ref 0–12)
NEUTROPHILS # BLD AUTO: 2.53 10*3/MM3 (ref 1.5–8.3)
NEUTROPHILS NFR BLD AUTO: 61 % (ref 41–71)
PLATELET # BLD AUTO: 264 10*3/MM3 (ref 150–450)
PMV BLD AUTO: 9.7 FL (ref 6–12)
RBC # BLD AUTO: 3.68 10*6/MM3 (ref 3.89–5.14)
WBC NRBC COR # BLD: 4.15 10*3/MM3 (ref 3.5–10.8)

## 2018-12-14 PROCEDURE — 36415 COLL VENOUS BLD VENIPUNCTURE: CPT | Performed by: PHYSICIAN ASSISTANT

## 2018-12-14 PROCEDURE — 85025 COMPLETE CBC W/AUTO DIFF WBC: CPT | Performed by: PHYSICIAN ASSISTANT

## 2018-12-14 PROCEDURE — 85018 HEMOGLOBIN: CPT | Performed by: PHYSICIAN ASSISTANT

## 2018-12-14 PROCEDURE — 99213 OFFICE O/P EST LOW 20 MIN: CPT | Performed by: PHYSICIAN ASSISTANT

## 2018-12-14 NOTE — TELEPHONE ENCOUNTER
----- Message from Christy Sun sent at 12/14/2018  8:50 AM EST -----  Contact: SELF  WILL SADLER SAID SHE VOMITED LAST NIGHT AT 5:00 AND 8:00 AND BOTH TIMES SHE SAID THERE WAS A LOT OF BLACK STUFF IN IT. SHE SAID SHE HAD A LOT OF ACID REFLUX AND BELCHING AND FEELS WEAK THIS MORNING BUT SHE IS CONCERNED ABOUT THE BLACK. SHE WANTS TO KNOW WHAT YOU WOULD SUGGEST SHE DO FOR THIS. WILL CAN BE REACHED [phone]

## 2018-12-14 NOTE — TELEPHONE ENCOUNTER
"Spoke to pt. States she had severe reflux yesterday afternoon and 2 episodes of vomiting with \"very dark vomit\" last night. She states she had several cups of coffee that morning and previous night, as well as red wine the previous night which she thinks caused dark vomit. Today, no N/V or abdominal pain, only belching and mild reflux.  Pt not on PO NSAIDs or ASA. No dark stools, or blood in stool or vomit. Pt had EGD June 2017.   Advised BRAT diet, advance diet slowly, avoid high acid food/ drink with no coffee, red wine, spicy foods until symptoms resolve.  Pt has prev scheduled appt with PCP on Monday, will f/u further at that time. Pt verb appreciation and in agreement with this plan.   "

## 2018-12-14 NOTE — PROGRESS NOTES
"Chief Complaint   Patient presents with   • Vomiting     Black stuff        Subjective       History of Present Illness     Brandi Duncan is a 73 y.o. female. She presents with 1 day history severe acid reflux and black vomit. Pt states she had severe reflux yesterday afternoon, followed by two episodes of \"very dark vomit\" last night. She did not note any blood in vomit. Pt attributes dark vomit to several cups of coffee yesterday morning, as well as glass of red wine the previous evening. She has never had GI bleed in the past. Pt had EGD in 2017 which showed non-erosive gastritis and hiatal hernia, but no other concerning findings. Colonoscopy in 2018 with no significant findings. Pt is not on any NSAIDs, ASA or blood thinners. She is taking pantoprazole daily for GERD. Since last night, pt states she has not had any further N/V, just feeling weak from vomiting episodes. She denies any dark stool or blood in stool, changes in urination or difficulty urinating, fever, chills.        The following portions of the patient's history were reviewed and updated as appropriate: allergies, current medications, past medical history, past social history, past surgical history and problem list.    No Known Allergies  Social History     Tobacco Use   • Smoking status: Former Smoker     Years: 8.00     Types: Cigarettes     Start date:      Last attempt to quit:      Years since quittin.9   • Smokeless tobacco: Never Used   Substance Use Topics   • Alcohol use: Yes     Alcohol/week: 4.2 oz     Types: 7 Glasses of wine per week     Frequency: 4 or more times a week     Drinks per session: 1 or 2         Current Outpatient Medications:   •  acetaminophen (TYLENOL) 650 MG 8 hr tablet, Take 1,300 mg by mouth Every 8 (Eight) Hours As Needed for Mild Pain ., Disp: , Rfl:   •  AMITIZA 24 MCG capsule, Take 1 capsule by mouth 2 (Two) Times a Day., Disp: , Rfl:   •  Calcium-Magnesium-Vitamin D (CALCIUM " MAGNESIUM PO), Take  by mouth., Disp: , Rfl:   •  custom compounded cream, Apply  topically. CUSTOM COMPOUNDED RECTAL SUPPOSITORIES BELLADONNA/MORPHINE SULF 16.2/5MG  INSERT 1 SUPPOSITORY TWICE DAILY AS NEEDED, Disp: , Rfl:   •  custom compounded cream, Apply  topically. CUSTOM COMPOUNDED RECTAL SUPPOSITORY HYDROCORTISONE/LIDOCAINE 2.5% / 2% INSERT 1 SUPPOSITORY TWICE DAILY AS NEEDED, Disp: , Rfl:   •  DHA-EPA-Vitamin E (OMEGA-3 COMPLEX PO), Take 1 capsule by mouth Daily., Disp: , Rfl:   •  diclofenac (VOLTAREN) 1 % gel gel, Apply 4 g topically to the appropriate area as directed 4 (Four) Times a Day As Needed (PAIN)., Disp: 100 g, Rfl: 5  •  Dietary Management Product (VASCULERA PO), Take 1 tablet by mouth Daily., Disp: , Rfl:   •  Diphenhydramine-APAP, sleep, (TYLENOL PM EXTRA STRENGTH PO), Take 2 tablets by mouth At Night As Needed., Disp: , Rfl:   •  doxazosin (CARDURA) 2 MG tablet, Take 1 tablet by mouth Every Night., Disp: 90 tablet, Rfl: 1  •  famotidine (PEPCID) 20 MG tablet, Take 20 mg by mouth 2 (Two) Times a Day As Needed., Disp: , Rfl:   •  fluticasone (FLONASE) 50 MCG/ACT nasal spray, 2 sprays into each nostril Daily As Needed., Disp: , Rfl:   •  gabapentin (NEURONTIN) 100 MG capsule, TAKE 2 CAPSULES AT NOON AND 3 CAPSULES AT HS, Disp: , Rfl:   •  Lidocaine, Anorectal, (RECTICARE) 5 % cream cream, Apply  topically 4 (Four) Times a Day As Needed., Disp: , Rfl:   •  Loratadine-Pseudoephedrine (CLARITIN-D 12 HOUR PO), Take 1 tablet by mouth Daily As Needed., Disp: , Rfl:   •  montelukast (SINGULAIR) 10 MG tablet, Take 1 tablet by mouth Daily As Needed (ALLERGIES)., Disp: 90 tablet, Rfl: 1  •  pantoprazole (PROTONIX) 40 MG EC tablet, Take 1 tablet by mouth Daily., Disp: , Rfl:   •  polyethylene glycol (MIRALAX) powder, Take 17 g by mouth Daily As Needed (constipation)., Disp: , Rfl:   •  Probiotic Product (PROBIOTIC DAILY PO), Take 1 tablet by mouth Daily., Disp: , Rfl:   •  raloxifene (EVISTA) 60 MG  tablet, Take 1 tablet by mouth Daily., Disp: 90 tablet, Rfl: 1  •  traMADol (ULTRAM) 50 MG tablet, Take 1 tablet by mouth Daily As Needed for Moderate Pain ., Disp: 30 tablet, Rfl: 3  •  VITAMIN D, CHOLECALCIFEROL, PO, Take  by mouth., Disp: , Rfl:   •  Wheat Dextrin (BENEFIBER DRINK MIX PO), Take  by mouth., Disp: , Rfl:   •  betamethasone, augmented, (DIPROLENE) 0.05 % ointment, Apply  topically to the appropriate area as directed 2 (Two) Times a Day., Disp: 15 g, Rfl: 1    Review of Systems   Constitutional: Negative for chills, fatigue and fever.   HENT: Negative for congestion, ear pain and sore throat.    Respiratory: Negative for cough and shortness of breath.    Cardiovascular: Negative for chest pain and palpitations.   Gastrointestinal: Positive for nausea, vomiting and GERD. Negative for abdominal pain, blood in stool and diarrhea.   Genitourinary: Negative for dysuria and hematuria.   Skin: Negative for rash.   Neurological: Positive for weakness. Negative for dizziness, syncope and headache.   Hematological: Does not bruise/bleed easily.       Objective   Vitals:    12/14/18 1459   BP: 110/68   Pulse: 81   Resp: 18   Temp: 97.5 °F (36.4 °C)   SpO2: 94%     Physical Exam   Constitutional: She appears well-developed and well-nourished.   HENT:   Head: Normocephalic and atraumatic.   Right Ear: Tympanic membrane, external ear and ear canal normal.   Left Ear: Tympanic membrane, external ear and ear canal normal.   Mouth/Throat: Oropharynx is clear and moist and mucous membranes are normal.   Eyes: Conjunctivae are normal.   Neck: Normal range of motion. Neck supple.   Cardiovascular: Normal rate, regular rhythm and intact distal pulses.   No murmur heard.  Pulmonary/Chest: Effort normal and breath sounds normal. She has no wheezes. She has no rales.   Abdominal: Soft. There is no hepatosplenomegaly. There is no tenderness.   Lymphadenopathy:     She has no cervical adenopathy.   Psychiatric: She has a  normal mood and affect. Her behavior is normal.         Assessment/Plan   Brandi was seen today for vomiting.    Diagnoses and all orders for this visit:    Hematochezia  -     CBC & Differential  -     CBC & Differential; Future  -     CBC Auto Differential    Anemia, unspecified type  -     CBC & Differential  -     CBC & Differential; Future  -     CBC Auto Differential  -     POC Hemoglobin      HgB at 9.7 in office today. Vitals stable, no additional concerns for acute GI bleed today.  Will check CBC today, and advised pt to have CBC draw at [facility] this weekend for further monitoring of HgB. Pt agrees to go Saturday to [facility]- gave pt address for main lab.   Major foods with low acid, no spicy foods, BRAT diet until symptoms resolve. Continue all current meds.          Return for Next scheduled follow up.

## 2018-12-14 NOTE — TELEPHONE ENCOUNTER
Kirstin- with the dark vomit- I would have her come in today and at a minimum check a hemoglobin and hemocult to make sure she isn't have an upper GI bleed.

## 2018-12-14 NOTE — TELEPHONE ENCOUNTER
Please call pt and let her know I sent message to Dr. Negrete, and he advised she been seen in office today for visit and labs if possible. Please scheduled with me if possible, or with APRN per availability. Thanks.

## 2018-12-16 ENCOUNTER — LAB (OUTPATIENT)
Dept: LAB | Facility: HOSPITAL | Age: 73
End: 2018-12-16

## 2018-12-16 DIAGNOSIS — D64.9 ANEMIA, UNSPECIFIED TYPE: ICD-10-CM

## 2018-12-16 DIAGNOSIS — K92.1 HEMATOCHEZIA: ICD-10-CM

## 2018-12-16 LAB
BASOPHILS # BLD AUTO: 0.03 10*3/MM3 (ref 0–0.2)
BASOPHILS NFR BLD AUTO: 0.7 % (ref 0–1)
DEPRECATED RDW RBC AUTO: 43.7 FL (ref 37–54)
EOSINOPHIL # BLD AUTO: 0.06 10*3/MM3 (ref 0–0.3)
EOSINOPHIL NFR BLD AUTO: 1.3 % (ref 0–3)
ERYTHROCYTE [DISTWIDTH] IN BLOOD BY AUTOMATED COUNT: 12.9 % (ref 11.3–14.5)
HCT VFR BLD AUTO: 34.3 % (ref 34.5–44)
HGB BLD-MCNC: 11.3 G/DL (ref 11.5–15.5)
IMM GRANULOCYTES # BLD: 0 10*3/MM3 (ref 0–0.03)
IMM GRANULOCYTES NFR BLD: 0 % (ref 0–0.6)
LYMPHOCYTES # BLD AUTO: 1.64 10*3/MM3 (ref 0.6–4.8)
LYMPHOCYTES NFR BLD AUTO: 36 % (ref 24–44)
MCH RBC QN AUTO: 30.4 PG (ref 27–31)
MCHC RBC AUTO-ENTMCNC: 32.9 G/DL (ref 32–36)
MCV RBC AUTO: 92.2 FL (ref 80–99)
MONOCYTES # BLD AUTO: 0.7 10*3/MM3 (ref 0–1)
MONOCYTES NFR BLD AUTO: 15.4 % (ref 0–12)
NEUTROPHILS # BLD AUTO: 2.12 10*3/MM3 (ref 1.5–8.3)
NEUTROPHILS NFR BLD AUTO: 46.6 % (ref 41–71)
PLATELET # BLD AUTO: 239 10*3/MM3 (ref 150–450)
PMV BLD AUTO: 8.7 FL (ref 6–12)
RBC # BLD AUTO: 3.72 10*6/MM3 (ref 3.89–5.14)
WBC NRBC COR # BLD: 4.55 10*3/MM3 (ref 3.5–10.8)

## 2018-12-16 PROCEDURE — 85025 COMPLETE CBC W/AUTO DIFF WBC: CPT

## 2018-12-17 ENCOUNTER — OFFICE VISIT (OUTPATIENT)
Dept: INTERNAL MEDICINE | Facility: CLINIC | Age: 73
End: 2018-12-17

## 2018-12-17 VITALS
RESPIRATION RATE: 16 BRPM | WEIGHT: 134 LBS | DIASTOLIC BLOOD PRESSURE: 64 MMHG | SYSTOLIC BLOOD PRESSURE: 122 MMHG | TEMPERATURE: 97.9 F | BODY MASS INDEX: 24.91 KG/M2 | HEART RATE: 68 BPM

## 2018-12-17 DIAGNOSIS — K21.9 GASTROESOPHAGEAL REFLUX DISEASE WITHOUT ESOPHAGITIS: Primary | ICD-10-CM

## 2018-12-17 DIAGNOSIS — B07.9 VIRAL WARTS, UNSPECIFIED TYPE: ICD-10-CM

## 2018-12-17 DIAGNOSIS — G89.29 CHRONIC MIDLINE LOW BACK PAIN WITHOUT SCIATICA: ICD-10-CM

## 2018-12-17 DIAGNOSIS — M54.50 CHRONIC MIDLINE LOW BACK PAIN WITHOUT SCIATICA: ICD-10-CM

## 2018-12-17 DIAGNOSIS — L30.9 ECZEMA, UNSPECIFIED TYPE: ICD-10-CM

## 2018-12-17 DIAGNOSIS — G62.9 PERIPHERAL POLYNEUROPATHY: ICD-10-CM

## 2018-12-17 PROCEDURE — 99214 OFFICE O/P EST MOD 30 MIN: CPT | Performed by: INTERNAL MEDICINE

## 2018-12-17 RX ORDER — BETAMETHASONE DIPROPIONATE 0.5 MG/G
OINTMENT TOPICAL 2 TIMES DAILY
Qty: 15 G | Refills: 1 | Status: SHIPPED | OUTPATIENT
Start: 2018-12-17 | End: 2019-08-16

## 2018-12-17 NOTE — PROGRESS NOTES
Chief Complaint   Patient presents with   • Follow-up     3 MONTH FOLLOW UP CHRONIC MEDICAL PROBLEMS   • WART ON FINGER   • RASH ON HANDS   • SORE INSIDE NOSE       History of Present Illness      The patient presents for a follow-up related to GERD. The patient is on pantoprazole for her gastroesophageal reflux. The medication is taken on a regular basis and gives complete relief of the symptoms. She reports no abdominal pain, belching, chest pain, diarrhea, dysphagia, early satiety, heartburn, hoarseness, nausea, odynophagia, rectal bleeding, vomiting or weight loss. The GERD has no known aggravating factors.    The patient presents for follow-up of low back pain. She reports that the pain is worsened. The current treatment consists of physical therapy, anti-inflammatories, home exercises and chiropractic care.   She does not have associated fever, chills, weight loss, rashes, dysuria, hematuria, or a decreased urine stream. The pain radiates to the left buttock. There is no numbness. The patient denies loss of bladder control. The patient denies loss of bowel control. The patient reports no aggravating factors.    The patient presents for a follow-up of peripheral neuropathy which is manifested by numbness of the arms. The symptoms have been present for 6 months.       The patient has no symptoms of a dry cough, a wet cough, fever, facial pain, eye drainage, myalgias, decreased appetite, chills, rash, joint pain, a breast lump or GI bleeding.       The neuropathy is stable. She is on gabapentin which give good relief.   She presents for an initial evaluation with eczema on her hands. This has been present for one month. The condition is non-painful, scaling and itchy. No prior treatment has been administered.    Review of Systems    HENT- Denies Nasal Discharge, Sore Throat, Ear Pain, Ear Drainage, Headache, Sinus Pain, Nasal Congestion, Decreased Hearing or Tinnitus.    GASTROINTESTINAL- Denies  Constipation.    PULMONARY- Denies Wheezing, Dyspnea, Sputum Production, Cough, Hemoptysis or Pleuritic Chest Pain.    CARDIOVASCULAR- Denies Claudication, Edema, Syncope, Palpitations or Irregular Heart Beat.    ENDOCRINE- Denies Cold Intolerance, Depression, Fatigue, Hair Loss, Heat Intolerance, Memory Loss, Polydypsia, Polyphagia, Polyuria, Sleep Disturbance or Weight Gain.    Medications      Current Outpatient Medications:   •  acetaminophen (TYLENOL) 650 MG 8 hr tablet, Take 1,300 mg by mouth Every 8 (Eight) Hours As Needed for Mild Pain ., Disp: , Rfl:   •  AMITIZA 24 MCG capsule, Take 1 capsule by mouth 2 (Two) Times a Day., Disp: , Rfl:   •  Calcium-Magnesium-Vitamin D (CALCIUM MAGNESIUM PO), Take  by mouth., Disp: , Rfl:   •  custom compounded cream, Apply  topically. CUSTOM COMPOUNDED RECTAL SUPPOSITORIES BELLADONNA/MORPHINE SULF 16.2/5MG  INSERT 1 SUPPOSITORY TWICE DAILY AS NEEDED, Disp: , Rfl:   •  custom compounded cream, Apply  topically. CUSTOM COMPOUNDED RECTAL SUPPOSITORY HYDROCORTISONE/LIDOCAINE 2.5% / 2% INSERT 1 SUPPOSITORY TWICE DAILY AS NEEDED, Disp: , Rfl:   •  DHA-EPA-Vitamin E (OMEGA-3 COMPLEX PO), Take 1 capsule by mouth Daily., Disp: , Rfl:   •  diclofenac (VOLTAREN) 1 % gel gel, Apply 4 g topically to the appropriate area as directed 4 (Four) Times a Day As Needed (PAIN)., Disp: 100 g, Rfl: 5  •  Dietary Management Product (VASCULERA PO), Take 1 tablet by mouth Daily., Disp: , Rfl:   •  Diphenhydramine-APAP, sleep, (TYLENOL PM EXTRA STRENGTH PO), Take 2 tablets by mouth At Night As Needed., Disp: , Rfl:   •  doxazosin (CARDURA) 2 MG tablet, Take 1 tablet by mouth Every Night., Disp: 90 tablet, Rfl: 1  •  famotidine (PEPCID) 20 MG tablet, Take 20 mg by mouth 2 (Two) Times a Day As Needed., Disp: , Rfl:   •  fluticasone (FLONASE) 50 MCG/ACT nasal spray, 2 sprays into each nostril Daily As Needed., Disp: , Rfl:   •  gabapentin (NEURONTIN) 100 MG capsule, TAKE 2 CAPSULES AT NOON AND 3  CAPSULES AT HS, Disp: , Rfl:   •  Lidocaine, Anorectal, (RECTICARE) 5 % cream cream, Apply  topically 4 (Four) Times a Day As Needed., Disp: , Rfl:   •  Loratadine-Pseudoephedrine (CLARITIN-D 12 HOUR PO), Take 1 tablet by mouth Daily As Needed., Disp: , Rfl:   •  montelukast (SINGULAIR) 10 MG tablet, Take 1 tablet by mouth Daily As Needed (ALLERGIES)., Disp: 90 tablet, Rfl: 1  •  pantoprazole (PROTONIX) 40 MG EC tablet, Take 1 tablet by mouth Daily., Disp: , Rfl:   •  polyethylene glycol (MIRALAX) powder, Take 17 g by mouth Daily As Needed (constipation)., Disp: , Rfl:   •  Probiotic Product (PROBIOTIC DAILY PO), Take 1 tablet by mouth Daily., Disp: , Rfl:   •  raloxifene (EVISTA) 60 MG tablet, Take 1 tablet by mouth Daily., Disp: 90 tablet, Rfl: 1  •  traMADol (ULTRAM) 50 MG tablet, Take 1 tablet by mouth Daily As Needed for Moderate Pain ., Disp: 30 tablet, Rfl: 3  •  VITAMIN D, CHOLECALCIFEROL, PO, Take  by mouth., Disp: , Rfl:   •  Wheat Dextrin (BENEFIBER DRINK MIX PO), Take  by mouth., Disp: , Rfl:   •  betamethasone, augmented, (DIPROLENE) 0.05 % ointment, Apply  topically to the appropriate area as directed 2 (Two) Times a Day., Disp: 15 g, Rfl: 1     Allergies    No Known Allergies    Problem List    Patient Active Problem List   Diagnosis   • Rectal pain   • S/P hemorrhoidectomy 11/1/16   • Urinary retention   • Acute urinary retention       Medications, Allergies, Problems List and Past History were reviewed and updated.    Physical Examination    /64 (BP Location: Left arm, Patient Position: Sitting, Cuff Size: Adult)   Pulse 68   Temp 97.9 °F (36.6 °C) (Temporal)   Resp 16   Wt 60.8 kg (134 lb)   LMP  (LMP Unknown) Comment: LAST PAP 2015  Breastfeeding? No   BMI 24.91 kg/m²     HEENT: Head- Normocephalic Atraumatic. Facies- Within normal limits. Pinnas- Normal texture and shape bilaterally. Canals- Normal bilaterally. TMs- Normal bilaterally. Nares- Patent bilaterally. Nasal Septum- is  normal. There is no tenderness to palpation over the frontal or maxillary sinuses. Lids- Normal bilaterally. Conjunctiva- Clear bilaterally. Sclera- Anicteric bilaterally. Oropharynx- Moist with no lesions. Tonsils- No enlargement, erythema or exudate.    Neck: Thyroid- non enlarged, symmetric and has no nodules. No bruits are detected. ROM- Normal Range of Motion with no rigidity.    Lungs: Auscultation- Clear to auscultation bilaterally. There are no retractions, clubbing or cyanosis. The Expiratory to Inspiratory ratio is equal.    Lymph Nodes: Cervical- no enlarged lymph nodes noted. Clavicular- Deferred. Axillary- Deferred. Inguinal- Deferred.    Cardiovascular: There are no carotid bruits. Heart- Normal Rate with Regular rhythm and no murmurs. There are no gallops. There are no rubs. In the lower extremities there is no edema. The upper extremities do not have edema.    Abdomen: Soft, benign, non-tender with no masses, hernias, organomegaly or scars.    Back: The patient has a normal spinal curvature with no evidence of scoliosis. There are no skin lesions noted on the back. Palpation reveals no tenderness and normal muscle tone. The straight leg raising test is negative. The back has normal flexion, extension, rotation, and lateral bending.    Dermatologic: The patient has eczema on her hands. The eczema is pink. The affected skin is crusted and scaled and the condition is noted to be well demarcated.    Impression and Assessment    Eczema.    Gastroesophageal Reflux Disease.    Low Back Pain.    Peripheral Neuropathy.    Plan    Gastroesophageal Reflux Disease Plan: The current plan was continued.    Eczema Plan: The current plan was continued.    Low Back Pain Plan: She was referred to orthopedics.    Peripheral Neuropathy Plan: The current plan was continued.    Brandi was seen today for follow-up, wart on finger, rash on hands and sore inside nose.    Diagnoses and all orders for this  visit:    Gastroesophageal reflux disease without esophagitis    Viral warts, unspecified type    Eczema, unspecified type  -     betamethasone, augmented, (DIPROLENE) 0.05 % ointment; Apply  topically to the appropriate area as directed 2 (Two) Times a Day.    Chronic midline low back pain without sciatica  -     Ambulatory Referral to Orthopedic Surgery    Peripheral polyneuropathy          Return to Office    The patient was instructed to return for follow-up in 3 months.    The patient was instructed to return sooner if the condition changes, worsens, or does not resolve.

## 2019-02-19 RX ORDER — DOXAZOSIN 2 MG/1
TABLET ORAL
Qty: 90 TABLET | Refills: 0 | Status: SHIPPED | OUTPATIENT
Start: 2019-02-19 | End: 2019-05-18 | Stop reason: SDUPTHER

## 2019-03-18 ENCOUNTER — OFFICE VISIT (OUTPATIENT)
Dept: INTERNAL MEDICINE | Facility: CLINIC | Age: 74
End: 2019-03-18

## 2019-03-18 ENCOUNTER — HOSPITAL ENCOUNTER (OUTPATIENT)
Dept: GENERAL RADIOLOGY | Facility: HOSPITAL | Age: 74
Discharge: HOME | End: 2019-03-18
Admitting: INTERNAL MEDICINE

## 2019-03-18 VITALS
BODY MASS INDEX: 25.28 KG/M2 | DIASTOLIC BLOOD PRESSURE: 64 MMHG | RESPIRATION RATE: 16 BRPM | WEIGHT: 136 LBS | TEMPERATURE: 98.5 F | HEART RATE: 80 BPM | SYSTOLIC BLOOD PRESSURE: 118 MMHG

## 2019-03-18 DIAGNOSIS — M79.671 RIGHT FOOT PAIN: ICD-10-CM

## 2019-03-18 DIAGNOSIS — K21.9 GASTROESOPHAGEAL REFLUX DISEASE WITHOUT ESOPHAGITIS: Primary | ICD-10-CM

## 2019-03-18 DIAGNOSIS — M25.571 CHRONIC PAIN OF RIGHT ANKLE: ICD-10-CM

## 2019-03-18 DIAGNOSIS — D64.9 ANEMIA, UNSPECIFIED TYPE: ICD-10-CM

## 2019-03-18 DIAGNOSIS — L30.9 ECZEMA, UNSPECIFIED TYPE: ICD-10-CM

## 2019-03-18 DIAGNOSIS — G89.29 CHRONIC PAIN OF RIGHT ANKLE: ICD-10-CM

## 2019-03-18 DIAGNOSIS — J31.0 CHRONIC RHINITIS: ICD-10-CM

## 2019-03-18 LAB
ALBUMIN SERPL-MCNC: 4.27 G/DL (ref 3.2–4.8)
ALBUMIN/GLOB SERPL: 1.7 G/DL (ref 1.5–2.5)
ALP SERPL-CCNC: 48 U/L (ref 25–100)
ALT SERPL W P-5'-P-CCNC: 15 U/L (ref 7–40)
ANION GAP SERPL CALCULATED.3IONS-SCNC: 9 MMOL/L (ref 3–11)
AST SERPL-CCNC: 22 U/L (ref 0–33)
BASOPHILS # BLD AUTO: 0.04 10*3/MM3 (ref 0–0.2)
BASOPHILS NFR BLD AUTO: 0.7 % (ref 0–1)
BILIRUB SERPL-MCNC: 0.4 MG/DL (ref 0.3–1.2)
BUN BLD-MCNC: 16 MG/DL (ref 9–23)
BUN/CREAT SERPL: 23.2 (ref 7–25)
CALCIUM SPEC-SCNC: 9.4 MG/DL (ref 8.7–10.4)
CHLORIDE SERPL-SCNC: 102 MMOL/L (ref 99–109)
CO2 SERPL-SCNC: 26 MMOL/L (ref 20–31)
CREAT BLD-MCNC: 0.69 MG/DL (ref 0.6–1.3)
DEPRECATED RDW RBC AUTO: 45.5 FL (ref 37–54)
EOSINOPHIL # BLD AUTO: 0.08 10*3/MM3 (ref 0–0.3)
EOSINOPHIL NFR BLD AUTO: 1.4 % (ref 0–3)
ERYTHROCYTE [DISTWIDTH] IN BLOOD BY AUTOMATED COUNT: 13.8 % (ref 11.3–14.5)
GFR SERPL CREATININE-BSD FRML MDRD: 83 ML/MIN/1.73
GLOBULIN UR ELPH-MCNC: 2.5 GM/DL
GLUCOSE BLD-MCNC: 87 MG/DL (ref 70–100)
HCT VFR BLD AUTO: 33.8 % (ref 34.5–44)
HGB BLD-MCNC: 11.3 G/DL (ref 11.5–15.5)
IMM GRANULOCYTES # BLD AUTO: 0.01 10*3/MM3 (ref 0–0.05)
IMM GRANULOCYTES NFR BLD AUTO: 0.2 % (ref 0–0.6)
LYMPHOCYTES # BLD AUTO: 1.98 10*3/MM3 (ref 0.6–4.8)
LYMPHOCYTES NFR BLD AUTO: 34.4 % (ref 24–44)
MCH RBC QN AUTO: 30.4 PG (ref 27–31)
MCHC RBC AUTO-ENTMCNC: 33.4 G/DL (ref 32–36)
MCV RBC AUTO: 90.9 FL (ref 80–99)
MONOCYTES # BLD AUTO: 0.6 10*3/MM3 (ref 0–1)
MONOCYTES NFR BLD AUTO: 10.4 % (ref 0–12)
NEUTROPHILS # BLD AUTO: 3.04 10*3/MM3 (ref 1.5–8.3)
NEUTROPHILS NFR BLD AUTO: 52.9 % (ref 41–71)
PLATELET # BLD AUTO: 286 10*3/MM3 (ref 150–450)
PMV BLD AUTO: 9.4 FL (ref 6–12)
POTASSIUM BLD-SCNC: 4.2 MMOL/L (ref 3.5–5.5)
PROT SERPL-MCNC: 6.8 G/DL (ref 5.7–8.2)
RBC # BLD AUTO: 3.72 10*6/MM3 (ref 3.89–5.14)
SODIUM BLD-SCNC: 137 MMOL/L (ref 132–146)
WBC NRBC COR # BLD: 5.75 10*3/MM3 (ref 3.5–10.8)

## 2019-03-18 PROCEDURE — 73610 X-RAY EXAM OF ANKLE: CPT

## 2019-03-18 PROCEDURE — 85025 COMPLETE CBC W/AUTO DIFF WBC: CPT | Performed by: INTERNAL MEDICINE

## 2019-03-18 PROCEDURE — 80053 COMPREHEN METABOLIC PANEL: CPT | Performed by: INTERNAL MEDICINE

## 2019-03-18 PROCEDURE — 73630 X-RAY EXAM OF FOOT: CPT

## 2019-03-18 PROCEDURE — 99215 OFFICE O/P EST HI 40 MIN: CPT | Performed by: INTERNAL MEDICINE

## 2019-03-18 PROCEDURE — 36415 COLL VENOUS BLD VENIPUNCTURE: CPT | Performed by: INTERNAL MEDICINE

## 2019-03-19 NOTE — PROGRESS NOTES
Chief Complaint   Patient presents with   • Follow-up     3 MONTH FOLLOW UP CHRONIC MEDICAL PROBLEMS       History of Present Illness    The patient presents for a follow-up related to GERD. The patient is on famotidine and pantoprazole for her gastroesophageal reflux. The medication is taken on a regular basis and gives complete relief of the symptoms. She reports no abdominal pain, belching, chest pain, diarrhea, dysphagia, early satiety, heartburn, hoarseness, nausea, odynophagia, rectal bleeding, vomiting or weight loss. The GERD has no known aggravating factors.    The patient presents for a follow-up related to anemia. There are no reports of blood loss. The patient has no symptoms of a dry cough, a wet cough, wheezing, fever, a headache, myalgias, sweats, decreased appetite, chills, fatigue or paresthesias. The patient's energy level is normal. There are no reports of shortness of breath, palpitations or syncope.    She presents for an initial evaluation with eczema on her hands. This has been present for longer than one year. The condition is non-painful, scaling and itchy. No prior treatment has been administered.    The patient reports a chronic history of upper respiratory symptoms. The patient has symptoms of rhinorrhea but the patient does not have facial pain, tooth pain, eye drainage, ear pain, ear drainage, nasal congestion or rash. The nasal discharge is clear. The patient has not tried anything for treatment of this illness.   The patient presents with pain in the right foot of many months duration. There is no history of trauma. The patient has no joint swelling. There is no stiffness. The patient denies a history of overuse or repetitive motion with the affected joints.    The patient denies dry eyes, cough, dry mouth or hematuria. There are no associated insect bites. There is no family history of rheumatoid arthritis, juvenile rheumatoid arthritis, systemic lupus erythematosis or gout. The  patient denies a personal history of malignancy.    The joint pain is aggravated by motion. The pain has no alleviating factors noted.     The patient is being seen for an acute visit for pain in the right ankle of many months duration. There is no history of trauma. The patient has no joint swelling. There is no stiffness. The patient denies a history of overuse or repetitive motion with the affected joints.    Review of Systems    CONSTITUTIONAL- Denies Weakness or Malaise.    NECK- Denies Decreased Range of Motion, Stiffness, Thyroid Nodules, Enlarged Lymph Nodes or Goiter.    EYES- Denies Eye Pain, Eye Redness, Eye Discharge, Decreased Vision, Visual Disturbance, Diplopia, Visual Loss or Swollen Eyelid.    HENT- Denies Sinus Pain or Decreased Hearing.    PULMONARY- Denies Sputum Production, Hemoptysis or Pleuritic Chest Pain.    GASTROINTESTINAL- Denies Constipation.    CARDIOVASCULAR- Denies Claudication, Edema or Irregular Heart Beat.    GENITOURINARY- Denies Dysuria, Urinary Frequency, Urinary Leakage, Pelvic Pain, Vaginal Prolapse, Vaginal Discharge, Dyspareunia or Abnormal Menses.    ENDOCRINE- Denies Cold Intolerance, Depression, Hair Loss, Heat Intolerance, Memory Loss, Polydypsia, Polyphagia, Polyuria, Sleep Disturbance or Weight Gain.    NEUROLOGIC- Denies Seizures, Visual Changes, Confusion or Excessive Daytime Sleepiness.    MUSCULOSKELETAL- Reports: Joint Pain. Denies: Joint Stiffness, Decreased Range of Motion or Joint Swelling.    INTEGUMENTARY- Denies Lumps, Sores, Itching, Dryness, Color Change, Changes in Hair, Brittle Nails, Discolored Nails, Thickened Nails, Growths or Alopecia.    Medications      Current Outpatient Medications:   •  acetaminophen (TYLENOL) 650 MG 8 hr tablet, Take 1,300 mg by mouth Every 8 (Eight) Hours As Needed for Mild Pain ., Disp: , Rfl:   •  AMITIZA 24 MCG capsule, Take 1 capsule by mouth 2 (Two) Times a Day., Disp: , Rfl:   •  B Complex Vitamins (VITAMIN B COMPLEX  PO), Take 1 tablet by mouth Daily., Disp: , Rfl:   •  betamethasone, augmented, (DIPROLENE) 0.05 % ointment, Apply  topically to the appropriate area as directed 2 (Two) Times a Day., Disp: 15 g, Rfl: 1  •  Calcium-Magnesium-Vitamin D (CALCIUM MAGNESIUM PO), Take  by mouth., Disp: , Rfl:   •  CBD (cannabidiol) oral oil, Take 1 mL by mouth 2 (Two) Times a Day., Disp: , Rfl:   •  custom compounded cream, Apply  topically. CUSTOM COMPOUNDED RECTAL SUPPOSITORIES BELLADONNA/MORPHINE SULF 16.2/5MG  INSERT 1 SUPPOSITORY TWICE DAILY AS NEEDED, Disp: , Rfl:   •  custom compounded cream, Apply  topically. CUSTOM COMPOUNDED RECTAL SUPPOSITORY HYDROCORTISONE/LIDOCAINE 2.5% / 2% INSERT 1 SUPPOSITORY TWICE DAILY AS NEEDED, Disp: , Rfl:   •  DHA-EPA-Vitamin E (OMEGA-3 COMPLEX PO), Take 1 capsule by mouth Daily., Disp: , Rfl:   •  diclofenac (VOLTAREN) 1 % gel gel, Apply 4 g topically to the appropriate area as directed 4 (Four) Times a Day As Needed (PAIN)., Disp: 100 g, Rfl: 5  •  Dietary Management Product (VASCULERA PO), Take 1 tablet by mouth Daily., Disp: , Rfl:   •  Diphenhydramine-APAP, sleep, (TYLENOL PM EXTRA STRENGTH PO), Take 2 tablets by mouth At Night As Needed., Disp: , Rfl:   •  doxazosin (CARDURA) 2 MG tablet, TAKE ONE TABLET BY MOUTH EVERY EVENING, Disp: 90 tablet, Rfl: 0  •  famotidine (PEPCID) 20 MG tablet, Take 20 mg by mouth 2 (Two) Times a Day As Needed., Disp: , Rfl:   •  fluticasone (FLONASE) 50 MCG/ACT nasal spray, 2 sprays into each nostril Daily As Needed., Disp: , Rfl:   •  gabapentin (NEURONTIN) 100 MG capsule, TAKE 3 CAPSULES AT NOON AND 5 CAPSULES AT HS, Disp: , Rfl:   •  Lidocaine, Anorectal, (RECTICARE) 5 % cream cream, Apply  topically 4 (Four) Times a Day As Needed., Disp: , Rfl:   •  Loratadine-Pseudoephedrine (CLARITIN-D 12 HOUR PO), Take 1 tablet by mouth Daily As Needed., Disp: , Rfl:   •  montelukast (SINGULAIR) 10 MG tablet, Take 1 tablet by mouth Daily As Needed (ALLERGIES)., Disp: 90  tablet, Rfl: 1  •  pantoprazole (PROTONIX) 40 MG EC tablet, Take 1 tablet by mouth Daily., Disp: , Rfl:   •  polyethylene glycol (MIRALAX) powder, Take 17 g by mouth Daily As Needed (constipation)., Disp: , Rfl:   •  Probiotic Product (PROBIOTIC DAILY PO), Take 1 tablet by mouth Daily., Disp: , Rfl:   •  raloxifene (EVISTA) 60 MG tablet, Take 1 tablet by mouth Daily., Disp: 90 tablet, Rfl: 1  •  traMADol (ULTRAM) 50 MG tablet, Take 1 tablet by mouth Daily As Needed for Moderate Pain ., Disp: 30 tablet, Rfl: 3  •  VITAMIN D, CHOLECALCIFEROL, PO, Take  by mouth., Disp: , Rfl:   •  Wheat Dextrin (BENEFIBER DRINK MIX PO), Take  by mouth., Disp: , Rfl:      Allergies    No Known Allergies    Problem List    Patient Active Problem List   Diagnosis   • Rectal pain   • S/P hemorrhoidectomy 11/1/16   • Urinary retention   • Acute urinary retention       Medications, Allergies, Problems List and Past History were reviewed and updated.    Physical Examination    /64 (BP Location: Left arm, Patient Position: Sitting, Cuff Size: Adult)   Pulse 80   Temp 98.5 °F (36.9 °C) (Temporal)   Resp 16   Wt 61.7 kg (136 lb)   LMP  (LMP Unknown) Comment: LAST PAP 2015  Breastfeeding? No   BMI 25.28 kg/m²     HEENT: Head- Normocephalic Atraumatic. Facies- Within normal limits. Pinnas- Normal texture and shape bilaterally. Canals- Normal bilaterally. TMs- Normal bilaterally. Nares- Patent bilaterally. Nasal Septum- is normal. There is no tenderness to palpation over the frontal or maxillary sinuses. Lids- Normal bilaterally. Conjunctiva- Clear bilaterally. Sclera- Anicteric bilaterally. Oropharynx- Moist with no lesions. Tonsils- No enlargement, erythema or exudate.    Neck: Thyroid- non enlarged, symmetric and has no nodules. No bruits are detected. ROM- Normal Range of Motion with no rigidity.    Lungs: Auscultation- Clear to auscultation bilaterally. There are no retractions, clubbing or cyanosis. The Expiratory to Inspiratory  ratio is equal.    Lymph Nodes: Cervical- no enlarged lymph nodes noted. Clavicular- Deferred. Axillary- Deferred. Inguinal- Deferred.    Cardiovascular: There are no carotid bruits. Heart- Normal Rate with Regular rhythm and no murmurs. There are no gallops. There are no rubs. In the lower extremities there is no edema. The upper extremities do not have edema.    Abdomen: Soft, benign, non-tender with no masses, hernias, organomegaly or scars.    Ankles: The ankles are symmetric with normal brian landmarks. There is no tenderness to palpation bilaterally. There is no tenderness over the Achilles tendons bilaterally. The Achilles tendons have a normal range of motion bilaterally. The ankles have normal inversion bilaterally. The ankles have normal eversion bilaterally.    Feet: The feet are symmetric with normal brian landmarks. There is no tenderness to palpation bilaterally. The feet have normal posterior tibial and dorsalis pedis pulses and normal capillary refill bilaterally. The monofilament examination is normal bilaterally.       The arches are normal bilaterally. There are no skin or nail lesions present. There are no ingrown nails. There are no bunions noted.    Dermatologic: The patient has eczema on her hands. The eczema is pink. The affected skin is crusted and scaled and the condition is noted to be well demarcated.    Radiology    My personal interpretation of the x-rays is as stated below:    The x-ray of the right ankle reveals normal brian structure.    The X-Ray of the right foot reveals normal brian structure.    Impression and Assessment    Eczema.    Gastroesophageal Reflux Disease.    Anemia.    Rhinitis.    Foot Pain.    Right Ankle Pain.    Plan    Gastroesophageal Reflux Disease Plan: The current plan was continued.    Rhinitis Plan: Reassurance was given.    Eczema Plan: The current medications were continued.    Foot Pain Plan: Acetaminophen may be used for fever or pain.    Right Ankle  Pain Plan: Acetaminophen may be used for fever or pain.    Anemia Plan: Further plans will be made after results of tests are available.    Brandi was seen today for follow-up.    Diagnoses and all orders for this visit:    Gastroesophageal reflux disease without esophagitis  -     CBC & Differential  -     Comprehensive Metabolic Panel  -     CBC Auto Differential    Eczema, unspecified type    Anemia, unspecified type  -     CBC & Differential  -     Comprehensive Metabolic Panel  -     CBC Auto Differential    Right foot pain  -     XR Foot 3+ View Right; Future    Chronic rhinitis    Chronic pain of right ankle  -     XR Ankle 3+ View Right; Future        Return to Office    The patient was instructed to return for follow-up in 6 months.    The patient was instructed to return sooner if the condition changes, worsens, or does not resolve.    Documentation of Time    A total of 35 minutes were spent in direct face to face counseling activities by the provider out of a 50 minute visit. The patient was counseled on questions related to the diagnosis, patient education, actions and mechanisms of the prescribed medication, alternatives to the prescribed medication and answers to an extensive list of questions from the patient.

## 2019-04-03 RX ORDER — RALOXIFENE HYDROCHLORIDE 60 MG/1
TABLET, FILM COATED ORAL
Qty: 90 TABLET | Refills: 1 | Status: SHIPPED | OUTPATIENT
Start: 2019-04-03 | End: 2019-09-25 | Stop reason: SDUPTHER

## 2019-04-23 ENCOUNTER — TELEPHONE (OUTPATIENT)
Dept: INTERNAL MEDICINE | Facility: CLINIC | Age: 74
End: 2019-04-23

## 2019-04-23 RX ORDER — PANTOPRAZOLE SODIUM 40 MG/1
40 TABLET, DELAYED RELEASE ORAL DAILY
Qty: 90 TABLET | Refills: 1 | Status: SHIPPED | OUTPATIENT
Start: 2019-04-23 | End: 2019-10-19 | Stop reason: SDUPTHER

## 2019-04-23 RX ORDER — MONTELUKAST SODIUM 10 MG/1
TABLET ORAL
Qty: 90 TABLET | Refills: 0 | Status: SHIPPED | OUTPATIENT
Start: 2019-04-23 | End: 2019-08-14 | Stop reason: SDUPTHER

## 2019-04-23 NOTE — TELEPHONE ENCOUNTER
----- Message from Christy Sun sent at 4/23/2019  9:11 AM EDT -----  Contact: self  Brandi Duncan calling for a refill for Pantoprazole SOD ER 40 mg once daily. She uses the [facility] in [facility]. Brandi can be reached at [phone].

## 2019-04-23 NOTE — TELEPHONE ENCOUNTER
Please call in 6 month supply (Either 1 month with RF 5 or 3 months with RF 1).  Abram Myers MD  1:53 PM  04/23/19

## 2019-05-03 ENCOUNTER — OFFICE VISIT (OUTPATIENT)
Dept: INTERNAL MEDICINE | Facility: CLINIC | Age: 74
End: 2019-05-03

## 2019-05-03 VITALS
WEIGHT: 131 LBS | SYSTOLIC BLOOD PRESSURE: 116 MMHG | HEIGHT: 62 IN | RESPIRATION RATE: 16 BRPM | DIASTOLIC BLOOD PRESSURE: 72 MMHG | TEMPERATURE: 97.4 F | HEART RATE: 77 BPM | BODY MASS INDEX: 24.11 KG/M2 | OXYGEN SATURATION: 99 %

## 2019-05-03 DIAGNOSIS — H61.22 HEARING LOSS OF LEFT EAR DUE TO CERUMEN IMPACTION: Primary | ICD-10-CM

## 2019-05-03 PROCEDURE — 99213 OFFICE O/P EST LOW 20 MIN: CPT | Performed by: PHYSICIAN ASSISTANT

## 2019-05-03 PROCEDURE — 69209 REMOVE IMPACTED EAR WAX UNI: CPT | Performed by: PHYSICIAN ASSISTANT

## 2019-05-03 NOTE — PROGRESS NOTES
"Chief Complaint   Patient presents with   • Ear Fullness     left ear, low hum in , x2 days       Subjective       History of Present Illness     Brandi Duncan is a 73 y.o. female. She presents with 2 day history of L ear fullness and tinnitus. Pt states she has a \"low humming\" noise in her L ear. She feels that her L ear is blocked with wax, and she did use a Qtip 2 day ago which she states worsened the issue. She has decreased hearing and voices are muffled. This is similar to previous episodes of wax impaction, per pt. She does have congestion alternating with runny nose with allergies recently. Otherwise no new symptoms. Denies fever, chills, ear pain, ear discharge, sore throat, N/V/D. She has not tried anything for the tx of this issue.     The following portions of the patient's history were reviewed and updated as appropriate: allergies, current medications, past social history and problem list.    No Known Allergies  Social History     Tobacco Use   • Smoking status: Former Smoker     Years: 8.00     Types: Cigarettes     Start date:      Last attempt to quit:      Years since quittin.3   • Smokeless tobacco: Never Used   Substance Use Topics   • Alcohol use: Yes     Alcohol/week: 4.2 oz     Types: 7 Glasses of wine per week     Frequency: 4 or more times a week     Drinks per session: 1 or 2         Current Outpatient Medications:   •  acetaminophen (TYLENOL) 650 MG 8 hr tablet, Take 1,300 mg by mouth Every 8 (Eight) Hours As Needed for Mild Pain ., Disp: , Rfl:   •  AMITIZA 24 MCG capsule, Take 1 capsule by mouth 2 (Two) Times a Day., Disp: , Rfl:   •  B Complex Vitamins (VITAMIN B COMPLEX PO), Take 1 tablet by mouth Daily., Disp: , Rfl:   •  betamethasone, augmented, (DIPROLENE) 0.05 % ointment, Apply  topically to the appropriate area as directed 2 (Two) Times a Day., Disp: 15 g, Rfl: 1  •  Calcium-Magnesium-Vitamin D (CALCIUM MAGNESIUM PO), Take  by mouth., Disp: , Rfl:   •  CBD " (cannabidiol) oral oil, Take 1 mL by mouth 2 (Two) Times a Day., Disp: , Rfl:   •  custom compounded cream, Apply  topically. CUSTOM COMPOUNDED RECTAL SUPPOSITORIES BELLADONNA/MORPHINE SULF 16.2/5MG  INSERT 1 SUPPOSITORY TWICE DAILY AS NEEDED, Disp: , Rfl:   •  custom compounded cream, Apply  topically. CUSTOM COMPOUNDED RECTAL SUPPOSITORY HYDROCORTISONE/LIDOCAINE 2.5% / 2% INSERT 1 SUPPOSITORY TWICE DAILY AS NEEDED, Disp: , Rfl:   •  DHA-EPA-Vitamin E (OMEGA-3 COMPLEX PO), Take 1 capsule by mouth Daily., Disp: , Rfl:   •  diclofenac (VOLTAREN) 1 % gel gel, Apply 4 g topically to the appropriate area as directed 4 (Four) Times a Day As Needed (PAIN)., Disp: 100 g, Rfl: 5  •  Dietary Management Product (VASCULERA PO), Take 1 tablet by mouth Daily., Disp: , Rfl:   •  Diphenhydramine-APAP, sleep, (TYLENOL PM EXTRA STRENGTH PO), Take 2 tablets by mouth At Night As Needed., Disp: , Rfl:   •  doxazosin (CARDURA) 2 MG tablet, TAKE ONE TABLET BY MOUTH EVERY EVENING, Disp: 90 tablet, Rfl: 0  •  famotidine (PEPCID) 20 MG tablet, Take 20 mg by mouth 2 (Two) Times a Day As Needed., Disp: , Rfl:   •  fluticasone (FLONASE) 50 MCG/ACT nasal spray, 2 sprays into each nostril Daily As Needed., Disp: , Rfl:   •  gabapentin (NEURONTIN) 100 MG capsule, TAKE 3 CAPSULES AT NOON AND 5 CAPSULES AT HS, Disp: , Rfl:   •  Lidocaine, Anorectal, (RECTICARE) 5 % cream cream, Apply  topically 4 (Four) Times a Day As Needed., Disp: , Rfl:   •  Loratadine-Pseudoephedrine (CLARITIN-D 12 HOUR PO), Take 1 tablet by mouth Daily As Needed., Disp: , Rfl:   •  montelukast (SINGULAIR) 10 MG tablet, TAKE ONE TABLET BY MOUTH DAILY, Disp: 90 tablet, Rfl: 0  •  pantoprazole (PROTONIX) 40 MG EC tablet, Take 1 tablet by mouth Daily., Disp: 90 tablet, Rfl: 1  •  polyethylene glycol (MIRALAX) powder, Take 17 g by mouth Daily As Needed (constipation)., Disp: , Rfl:   •  Probiotic Product (PROBIOTIC DAILY PO), Take 1 tablet by mouth Daily., Disp: , Rfl:   •   raloxifene (EVISTA) 60 MG tablet, TAKE 1 TABLET DAILY, Disp: 90 tablet, Rfl: 1  •  traMADol (ULTRAM) 50 MG tablet, Take 1 tablet by mouth Daily As Needed for Moderate Pain ., Disp: 30 tablet, Rfl: 3  •  VITAMIN D, CHOLECALCIFEROL, PO, Take  by mouth., Disp: , Rfl:   •  Wheat Dextrin (BENEFIBER DRINK MIX PO), Take  by mouth., Disp: , Rfl:     Review of Systems   Constitutional: Negative for chills, fatigue and fever.   HENT: Positive for congestion, hearing loss (decrease, L ear) and tinnitus (low hum sound). Negative for ear discharge, ear pain, postnasal drip, sore throat and trouble swallowing.         +wax impacted L ear   Respiratory: Negative for cough and shortness of breath.    Cardiovascular: Negative for chest pain.   Gastrointestinal: Negative for abdominal pain, nausea and vomiting.   Neurological: Negative for dizziness, light-headedness and headache.       Objective   Vitals:    05/03/19 1421   BP: 116/72   Pulse: 77   Resp: 16   Temp: 97.4 °F (36.3 °C)   SpO2: 99%       Physical Exam   Constitutional: She appears well-developed and well-nourished.   HENT:   Head: Normocephalic and atraumatic.   Right Ear: Tympanic membrane, external ear and ear canal normal.   Left Ear: Tympanic membrane, external ear and ear canal normal. An impacted cerumen (>90% impaction) is present.  Mouth/Throat: Oropharynx is clear and moist and mucous membranes are normal.   Eyes: Conjunctivae are normal.   Cardiovascular: Normal rate and regular rhythm.   No murmur heard.  Pulmonary/Chest: Effort normal and breath sounds normal. She has no wheezes. She has no rales.   Lymphadenopathy:     She has no cervical adenopathy.   Psychiatric: She has a normal mood and affect. Her behavior is normal.     Ear Cerumen Removal Instrumentation  Date/Time: 5/3/2019 2:48 PM  Performed by: Natalie Centeno MA  Authorized by: Windy Linda PA-C   Consent: Verbal consent obtained.  Risks and benefits: risks, benefits and alternatives were  discussed  Consent given by: patient  Patient understanding: patient states understanding of the procedure being performed  Patient identity confirmed: verbally with patient    Anesthesia:  Local Anesthetic: none  Location details: left ear  Patient tolerance: Patient tolerated the procedure well with no immediate complications  Comments: Left TM visualized after disimpaction with no erythema or bulging of TM.   Procedure type: irrigation   Sedation:  Patient sedated: no            Assessment/Plan   Brandi was seen today for ear fullness.    Diagnoses and all orders for this visit:    Hearing loss of left ear due to cerumen impaction  -     Ear Cerumen Removal Instrumentation      Advised debrox PRN.          Return if symptoms worsen or fail to improve.

## 2019-05-08 ENCOUNTER — OFFICE VISIT (OUTPATIENT)
Dept: INTERNAL MEDICINE | Facility: CLINIC | Age: 74
End: 2019-05-08

## 2019-05-08 VITALS
RESPIRATION RATE: 18 BRPM | SYSTOLIC BLOOD PRESSURE: 114 MMHG | TEMPERATURE: 98.6 F | WEIGHT: 133 LBS | HEART RATE: 76 BPM | DIASTOLIC BLOOD PRESSURE: 72 MMHG | BODY MASS INDEX: 24.72 KG/M2

## 2019-05-08 DIAGNOSIS — H65.02 ACUTE SEROUS OTITIS MEDIA OF LEFT EAR, RECURRENCE NOT SPECIFIED: Primary | ICD-10-CM

## 2019-05-08 PROCEDURE — 99214 OFFICE O/P EST MOD 30 MIN: CPT | Performed by: INTERNAL MEDICINE

## 2019-05-08 RX ORDER — METHYLPREDNISOLONE 4 MG/1
TABLET ORAL
Qty: 21 TABLET | Refills: 0 | Status: SHIPPED | OUTPATIENT
Start: 2019-05-08 | End: 2019-08-02

## 2019-05-08 NOTE — PROGRESS NOTES
Chief Complaint   Patient presents with   • Humming in left ear, decreased hearing       History of Present Illness      The patient presents with a 1 week history of left ear pain. There has not been drainage associated with the pain. She has high frequency tinnitus and a buzzing sensation. Her hearing is decreased. She does not have wheezing, a cough, a fever, facial pain, a headache, eye drainage, nasal discharge, chills, a rash, nausea, vomiting, diarrhea, congestion, decreased appetite, dizziness or a sore throat. The patient does not smoke. The patient has used antihistamines for treatment of this illness.   The antihistamines did not result in improvement of the patient's condition.    Review of Systems    CONSTITUTIONAL- Denies Unexplained Weight Loss, Sweats, Fatigue, Weakness or Malaise.    HENT- Reports: Ear Pain. Denies: Sinus Pain or Decreased Hearing.    Medications      Current Outpatient Medications:   •  acetaminophen (TYLENOL) 650 MG 8 hr tablet, Take 1,300 mg by mouth Every 8 (Eight) Hours As Needed for Mild Pain ., Disp: , Rfl:   •  AMITIZA 24 MCG capsule, Take 1 capsule by mouth 2 (Two) Times a Day., Disp: , Rfl:   •  B Complex Vitamins (VITAMIN B COMPLEX PO), Take 1 tablet by mouth Daily., Disp: , Rfl:   •  betamethasone, augmented, (DIPROLENE) 0.05 % ointment, Apply  topically to the appropriate area as directed 2 (Two) Times a Day., Disp: 15 g, Rfl: 1  •  Calcium-Magnesium-Vitamin D (CALCIUM MAGNESIUM PO), Take  by mouth., Disp: , Rfl:   •  CBD (cannabidiol) oral oil, Take 1 mL by mouth 2 (Two) Times a Day., Disp: , Rfl:   •  custom compounded cream, Apply  topically. CUSTOM COMPOUNDED RECTAL SUPPOSITORIES BELLADONNA/MORPHINE SULF 16.2/5MG  INSERT 1 SUPPOSITORY TWICE DAILY AS NEEDED, Disp: , Rfl:   •  custom compounded cream, Apply  topically. CUSTOM COMPOUNDED RECTAL SUPPOSITORY HYDROCORTISONE/LIDOCAINE 2.5% / 2% INSERT 1 SUPPOSITORY TWICE DAILY AS NEEDED, Disp: , Rfl:   •  DHA-EPA-Vitamin E  (OMEGA-3 COMPLEX PO), Take 1 capsule by mouth Daily., Disp: , Rfl:   •  diclofenac (VOLTAREN) 1 % gel gel, Apply 4 g topically to the appropriate area as directed 4 (Four) Times a Day As Needed (PAIN)., Disp: 100 g, Rfl: 5  •  Dietary Management Product (VASCULERA PO), Take 1 tablet by mouth Daily., Disp: , Rfl:   •  Diphenhydramine-APAP, sleep, (TYLENOL PM EXTRA STRENGTH PO), Take 2 tablets by mouth At Night As Needed., Disp: , Rfl:   •  doxazosin (CARDURA) 2 MG tablet, TAKE ONE TABLET BY MOUTH EVERY EVENING, Disp: 90 tablet, Rfl: 0  •  famotidine (PEPCID) 20 MG tablet, Take 20 mg by mouth 2 (Two) Times a Day As Needed., Disp: , Rfl:   •  fluticasone (FLONASE) 50 MCG/ACT nasal spray, 2 sprays into each nostril Daily As Needed., Disp: , Rfl:   •  gabapentin (NEURONTIN) 100 MG capsule, TAKE 3 CAPSULES AT NOON AND 5 CAPSULES AT HS, Disp: , Rfl:   •  Lidocaine, Anorectal, (RECTICARE) 5 % cream cream, Apply  topically 4 (Four) Times a Day As Needed., Disp: , Rfl:   •  Loratadine-Pseudoephedrine (CLARITIN-D 12 HOUR PO), Take 1 tablet by mouth Daily As Needed., Disp: , Rfl:   •  montelukast (SINGULAIR) 10 MG tablet, TAKE ONE TABLET BY MOUTH DAILY, Disp: 90 tablet, Rfl: 0  •  pantoprazole (PROTONIX) 40 MG EC tablet, Take 1 tablet by mouth Daily., Disp: 90 tablet, Rfl: 1  •  polyethylene glycol (MIRALAX) powder, Take 17 g by mouth Daily As Needed (constipation)., Disp: , Rfl:   •  Probiotic Product (PROBIOTIC DAILY PO), Take 1 tablet by mouth Daily., Disp: , Rfl:   •  raloxifene (EVISTA) 60 MG tablet, TAKE 1 TABLET DAILY, Disp: 90 tablet, Rfl: 1  •  traMADol (ULTRAM) 50 MG tablet, Take 1 tablet by mouth Daily As Needed for Moderate Pain ., Disp: 30 tablet, Rfl: 3  •  VITAMIN D, CHOLECALCIFEROL, PO, Take  by mouth., Disp: , Rfl:   •  Wheat Dextrin (BENEFIBER DRINK MIX PO), Take  by mouth., Disp: , Rfl:      Allergies    No Known Allergies    Problem List    Patient Active Problem List   Diagnosis   • Rectal pain   • S/P  hemorrhoidectomy 11/1/16   • Urinary retention   • Acute urinary retention       Medications, Allergies, Problems List and Past History were reviewed and updated.    Physical Examination    /72 (BP Location: Left arm, Patient Position: Sitting, Cuff Size: Adult)   Pulse 76   Temp 98.6 °F (37 °C) (Temporal)   Resp 18   Wt 60.3 kg (133 lb)   LMP  (LMP Unknown) Comment: LAST PAP 2015  Breastfeeding? No   BMI 24.72 kg/m²     HEENT: Head- Normocephalic Atraumatic. Facies- Within normal limits. Pinnas- Normal texture and shape bilaterally. Canals- Normal bilaterally. TMs- Right TM is normal; The left TM is dull, bulging and noted to have serous fluid. Nares- Patent bilaterally. Nasal Septum- is normal. There is no tenderness to palpation over the frontal or maxillary sinuses. Lids- Normal bilaterally. Conjunctiva- Clear bilaterally. Sclera- Anicteric bilaterally. Oropharynx- Moist with no lesions. Tonsils- No enlargement, erythema or exudate.    Neck: Thyroid- non enlarged, symmetric and has no nodules. No bruits are detected.    Lungs: Auscultation- Clear to auscultation bilaterally. There are no retractions, clubbing or cyanosis. The Expiratory to Inspiratory ratio is equal.    Lymph Nodes: Cervical- no enlarged lymph nodes noted.    Impression and Assessment    Serous Otitis Media.    Plan    Serous Otitis Media Plan: A cool mist humidifier was encouraged. She was encouraged to drink plenty of fluids. Medication will be added as noted below. Autoinsufflation and blowing nose were encouraged.    Brandi was seen today for humming in left ear, decreased hearing.    Diagnoses and all orders for this visit:    Acute serous otitis media of left ear, recurrence not specified  -     methylPREDNISolone (MEDROL, ROZ,) 4 MG tablet; Take as directed on package instructions.    '  Return to Office    The patient was instructed to return for follow-up at the next scheduled visit.    The patient was instructed to return  sooner if the condition changes, worsens, or does not resolve.

## 2019-05-20 RX ORDER — DOXAZOSIN 2 MG/1
TABLET ORAL
Qty: 90 TABLET | Refills: 0 | Status: SHIPPED | OUTPATIENT
Start: 2019-05-20 | End: 2019-08-14 | Stop reason: SDUPTHER

## 2019-05-22 ENCOUNTER — OFFICE VISIT (OUTPATIENT)
Dept: INTERNAL MEDICINE | Facility: CLINIC | Age: 74
End: 2019-05-22

## 2019-05-22 VITALS
HEART RATE: 72 BPM | TEMPERATURE: 97.4 F | RESPIRATION RATE: 16 BRPM | SYSTOLIC BLOOD PRESSURE: 116 MMHG | BODY MASS INDEX: 24.44 KG/M2 | DIASTOLIC BLOOD PRESSURE: 74 MMHG | WEIGHT: 132.8 LBS | OXYGEN SATURATION: 97 % | HEIGHT: 62 IN

## 2019-05-22 DIAGNOSIS — H69.82 DYSFUNCTION OF LEFT EUSTACHIAN TUBE: ICD-10-CM

## 2019-05-22 DIAGNOSIS — H65.02 ACUTE SEROUS OTITIS MEDIA OF LEFT EAR, RECURRENCE NOT SPECIFIED: Primary | ICD-10-CM

## 2019-05-22 DIAGNOSIS — H91.92 DECREASED HEARING OF LEFT EAR: ICD-10-CM

## 2019-05-22 PROCEDURE — 99214 OFFICE O/P EST MOD 30 MIN: CPT | Performed by: NURSE PRACTITIONER

## 2019-05-22 RX ORDER — AZELASTINE 1 MG/ML
2 SPRAY, METERED NASAL DAILY
Qty: 1 EACH | Refills: 12 | Status: SHIPPED | OUTPATIENT
Start: 2019-05-22 | End: 2020-03-05

## 2019-07-02 ENCOUNTER — TELEPHONE (OUTPATIENT)
Dept: INTERNAL MEDICINE | Facility: CLINIC | Age: 74
End: 2019-07-02

## 2019-07-02 RX ORDER — DIAZEPAM 5 MG/1
TABLET ORAL
Qty: 2 TABLET | Refills: 0 | Status: SHIPPED | OUTPATIENT
Start: 2019-07-02 | End: 2019-08-16

## 2019-07-02 NOTE — TELEPHONE ENCOUNTER
----- Message from Christy Sun sent at 7/2/2019  1:10 PM EDT -----  Contact: self  Brandi Duncan calling to see if she can get something prescribed to help calm her down for her MRI. She is scheduled for the MRI on 5/16/19 in [place]. She uses the [facility] in [facility]. She can be reached at [phone]

## 2019-07-02 NOTE — TELEPHONE ENCOUNTER
S/W pt, informed of rx sent to pharmacy with directions given.  Informed she will have to have a  since she taking med.  Verb good understanding, agreement and apprec.

## 2019-07-02 NOTE — TELEPHONE ENCOUNTER
Order entered for valium 5 mg 1 hours prior.  May repeat 30 minutes prior if needed.  Will need  if takes valium.    I have sent in rx.    Abram Myers MD  2:50 PM  07/02/19

## 2019-07-23 ENCOUNTER — TRANSCRIBE ORDERS (OUTPATIENT)
Dept: ADMINISTRATIVE | Facility: HOSPITAL | Age: 74
End: 2019-07-23

## 2019-07-23 DIAGNOSIS — Z12.31 VISIT FOR SCREENING MAMMOGRAM: Primary | ICD-10-CM

## 2019-08-02 ENCOUNTER — OFFICE VISIT (OUTPATIENT)
Dept: INTERNAL MEDICINE | Facility: CLINIC | Age: 74
End: 2019-08-02

## 2019-08-02 VITALS
DIASTOLIC BLOOD PRESSURE: 66 MMHG | SYSTOLIC BLOOD PRESSURE: 110 MMHG | HEART RATE: 82 BPM | BODY MASS INDEX: 24.91 KG/M2 | TEMPERATURE: 98.3 F | OXYGEN SATURATION: 96 % | RESPIRATION RATE: 20 BRPM | WEIGHT: 134 LBS

## 2019-08-02 DIAGNOSIS — K59.04 CHRONIC IDIOPATHIC CONSTIPATION: ICD-10-CM

## 2019-08-02 DIAGNOSIS — K44.9 HIATAL HERNIA WITH GERD: Primary | ICD-10-CM

## 2019-08-02 DIAGNOSIS — K21.9 HIATAL HERNIA WITH GERD: Primary | ICD-10-CM

## 2019-08-02 DIAGNOSIS — J32.9 SINUSITIS, UNSPECIFIED CHRONICITY, UNSPECIFIED LOCATION: ICD-10-CM

## 2019-08-02 PROCEDURE — 99214 OFFICE O/P EST MOD 30 MIN: CPT | Performed by: NURSE PRACTITIONER

## 2019-08-02 RX ORDER — CEPHALEXIN 500 MG/1
500 CAPSULE ORAL 3 TIMES DAILY
COMMUNITY
End: 2019-08-02

## 2019-08-02 RX ORDER — SUCRALFATE 1 G/1
1 TABLET ORAL 4 TIMES DAILY
Qty: 120 TABLET | Refills: 0 | Status: SHIPPED | OUTPATIENT
Start: 2019-08-02 | End: 2019-09-04 | Stop reason: SDUPTHER

## 2019-08-02 RX ORDER — CEFDINIR 300 MG/1
300 CAPSULE ORAL 2 TIMES DAILY
Qty: 20 CAPSULE | Refills: 0 | Status: SHIPPED | OUTPATIENT
Start: 2019-08-02 | End: 2019-08-16

## 2019-08-02 NOTE — PROGRESS NOTES
Chief Complaint   Patient presents with   • Cough     x1 week   • Nasal Congestion     x1 week   • Heartburn        Subjective     History of Present Illness   Patient is here today with 1 week history of cough, large postnasal drainage, sinus congestion.  Patient reports no fever, nausea, vomiting, diarrhea.  The patient is taking Flonase and Astelin for 2 weeks for her ear pain in her left ear.  She has had no discharge from the left ear.  She does have a frontal sinus headache.    Patient was seen 5/19 with URI and ear symptoms.  She had requested an ENT consult.  The consult was placed with the patient did not keep appointment.  She plans to see ENT in the near future.    Patient is currently on Keflex due to right axilla cyst noted and treated per dermatology.  She reports that this is resolved.    Patient reports having more reflux upon awakening and 3 breakfast.  She has hiatal hernia with GERD/gastritis.  Patient takes pantoprazole 40 mg twice daily.  She knows the gastroneurologist wants her to take less.  She was instructed to take the pantoprazole in the a.m. and Zantac or Pepcid in the p.m.  Last week she had one episode of vomiting.  No hematochezia.      The patient has history of chronic idiopathic constipation and is having issues with constipation at this time.  She is taking Amitiza and 1 dose of MiraLAX daily.        The following portions of the patient's history were reviewed and updated as appropriate: allergies, current medications, past family history, past medical history, past social history, past surgical history and problem list.    Review of Systems   Constitutional: Negative.    HENT: Positive for ear pain (intmittent L ear pain), postnasal drip and sinus pressure.    Eyes: Negative for discharge and itching.   Respiratory: Positive for cough. Negative for chest tightness, shortness of breath and wheezing.    Cardiovascular: Negative for chest pain.   Gastrointestinal: Positive for  constipation and GERD. Negative for abdominal pain, nausea and vomiting.   Genitourinary: Negative.    Musculoskeletal: Negative.    Neurological: Positive for headache.   Hematological: Negative.        Objective   Physical Exam   Constitutional: She is oriented to person, place, and time. She appears well-developed and well-nourished.   HENT:   Right Ear: External ear normal.   Left Ear: External ear normal.   Bilateral TMs with clear fluid.Nasal mucosa boggy clear postnasal drainage posterior pharynx without erythema edema or exudate   Eyes: Conjunctivae are normal.   Neck: No thyromegaly present.   Cardiovascular: Normal rate and regular rhythm.   Pulmonary/Chest: Effort normal and breath sounds normal.   Occasional congested cough   Abdominal: Soft. Bowel sounds are normal. She exhibits no distension. There is no tenderness.   Musculoskeletal: She exhibits no edema.   Lymphadenopathy:     She has no cervical adenopathy.   Neurological: She is alert and oriented to person, place, and time.   Skin: Skin is warm and dry.   Nursing note and vitals reviewed.        Assessment/Plan   Brandi was seen today for cough, nasal congestion and heartburn.    Diagnoses and all orders for this visit:    Hiatal hernia with GERD  -     sucralfate (CARAFATE) 1 g tablet; Take 1 tablet by mouth 4 (Four) Times a Day.    Chronic idiopathic constipation    Sinusitis, unspecified chronicity, unspecified location  -     Discontinue: cefdinir (OMNICEF) 300 MG capsule; Take 1 capsule by mouth 2 (Two) Times a Day.      Continue amitza, double miralax dose, and fleets enema both for 3 days     No Follow-up on file.  RTC/call  If symptoms worsen  Meds MOA and SE's reviewed and pt v/u

## 2019-08-14 RX ORDER — DOXAZOSIN 2 MG/1
TABLET ORAL
Qty: 90 TABLET | Refills: 1 | Status: SHIPPED | OUTPATIENT
Start: 2019-08-14 | End: 2020-02-10

## 2019-08-15 RX ORDER — MONTELUKAST SODIUM 10 MG/1
TABLET ORAL
Qty: 90 TABLET | Refills: 1 | Status: SHIPPED | OUTPATIENT
Start: 2019-08-15 | End: 2020-02-07

## 2019-08-16 ENCOUNTER — OFFICE VISIT (OUTPATIENT)
Dept: INTERNAL MEDICINE | Facility: CLINIC | Age: 74
End: 2019-08-16

## 2019-08-16 VITALS
OXYGEN SATURATION: 98 % | HEART RATE: 85 BPM | HEIGHT: 62 IN | BODY MASS INDEX: 24.66 KG/M2 | WEIGHT: 134 LBS | DIASTOLIC BLOOD PRESSURE: 60 MMHG | RESPIRATION RATE: 16 BRPM | SYSTOLIC BLOOD PRESSURE: 108 MMHG | TEMPERATURE: 97.2 F

## 2019-08-16 DIAGNOSIS — Z00.00 INITIAL MEDICARE ANNUAL WELLNESS VISIT: Primary | ICD-10-CM

## 2019-08-16 DIAGNOSIS — Z11.59 NEED FOR HEPATITIS C SCREENING TEST: ICD-10-CM

## 2019-08-16 DIAGNOSIS — Z12.4 CERVICAL CANCER SCREENING: ICD-10-CM

## 2019-08-16 DIAGNOSIS — Z23 IMMUNIZATION DUE: ICD-10-CM

## 2019-08-16 DIAGNOSIS — Z85.43 HISTORY OF OVARIAN CANCER: ICD-10-CM

## 2019-08-16 PROCEDURE — G0009 ADMIN PNEUMOCOCCAL VACCINE: HCPCS | Performed by: NURSE PRACTITIONER

## 2019-08-16 PROCEDURE — G0438 PPPS, INITIAL VISIT: HCPCS | Performed by: NURSE PRACTITIONER

## 2019-08-16 PROCEDURE — 90732 PPSV23 VACC 2 YRS+ SUBQ/IM: CPT | Performed by: NURSE PRACTITIONER

## 2019-08-16 PROCEDURE — 86803 HEPATITIS C AB TEST: CPT | Performed by: NURSE PRACTITIONER

## 2019-08-16 NOTE — PROGRESS NOTES
The ABCs of the Annual Wellness Visit  Initial Medicare Wellness Visit    Chief Complaint   Patient presents with   • Medicare Wellness-Initial Visit       Subjective   History of Present Illness:  Brandi Duncan is a 73 y.o. female who presents for an Initial Medicare Wellness Visit.    HEALTH RISK ASSESSMENT    Recent Hospitalizations:  No hospitalization(s) within the last year.    Current Medical Providers:  Patient Care Team:  Abram Myers MD as PCP - General (Internal Medicine)  Abram Myers MD as PCP - Claims Attributed  Yarelis Eddy MD as Consulting Physician (Colon and Rectal Surgery)  Yarelis Eddy MD as Consulting Physician (Colon and Rectal Surgery)  Vamsi Fleming MD as Consulting Physician (Neurology)  Lei Fontaine MD as Consulting Physician (Hand Surgery)  Brandi Aly MD as Consulting Physician (Dermatology)  Derick Cho (Urology)  Bautista Crystal MD as Consulting Physician (Otolaryngology)  Davy Singh MD as Consulting Physician (Cardiology)  Keo Tafoya MD as Surgeon (Orthopedic Surgery)    Smoking Status:  Social History     Tobacco Use   Smoking Status Former Smoker   • Years: 8.00   • Types: Cigarettes   • Start date:    • Last attempt to quit:    • Years since quittin.6   Smokeless Tobacco Never Used       Alcohol Consumption:  Social History     Substance and Sexual Activity   Alcohol Use Yes   • Alcohol/week: 4.2 oz   • Types: 7 Glasses of wine per week   • Frequency: 4 or more times a week   • Drinks per session: 1 or 2       Depression Screen:   PHQ-2/PHQ-9 Depression Screening 2019   Little interest or pleasure in doing things 0   Feeling down, depressed, or hopeless 0   Total Score 0       Fall Risk Screen:  STEADI Fall Risk Assessment has not been completed.    Health Habits and Functional and Cognitive Screening:  Functional & Cognitive Status 2019   Do you have difficulty preparing food and  eating? No   Do you have difficulty bathing yourself, getting dressed or grooming yourself? No   Do you have difficulty using the toilet? Yes   Do you have difficulty moving around from place to place? No   Do you have trouble with steps or getting out of a bed or a chair? No   Current Diet Well Balanced Diet   Dental Exam Up to date   Eye Exam Up to date   Exercise (times per week) 3 times per week   Current Exercise Activities Include Yoga   Do you need help using the phone?  No   Are you deaf or do you have serious difficulty hearing?  No   Do you need help with transportation? No   Do you need help shopping? No   Do you need help preparing meals?  No   Do you need help with housework?  Yes   Do you need help with laundry? No   Do you need help taking your medications? No   Do you need help managing money? No   Do you ever drive or ride in a car without wearing a seat belt? No   Have you felt unusual stress, anger or loneliness in the last month? No   Who do you live with? Spouse   If you need help, do you have trouble finding someone available to you? No   Have you been bothered in the last four weeks by sexual problems? No   Do you have difficulty concentrating, remembering or making decisions? No         Does the patient have evidence of cognitive impairment? No    Asprin use counseling:Does not need ASA (and currently is not on it)    Age-appropriate Screening Schedule:  Refer to the list below for future screening recommendations based on patient's age, sex and/or medical conditions. Orders for these recommended tests are listed in the plan section. The patient has been provided with a written plan.    Health Maintenance   Topic Date Due   • TDAP/TD VACCINES (1 - Tdap) 12/11/1964   • ZOSTER VACCINE (1 of 2) 12/11/1995   • INFLUENZA VACCINE  08/01/2019   • DXA SCAN  07/09/2020   • MAMMOGRAM  08/20/2020   • COLONOSCOPY  10/26/2023   • PNEUMOCOCCAL VACCINES (65+ LOW/MEDIUM RISK)  Completed          The  following portions of the patient's history were reviewed and updated as appropriate: allergies, current medications, past family history, past medical history, past social history, past surgical history and problem list.    Outpatient Medications Prior to Visit   Medication Sig Dispense Refill   • acetaminophen (TYLENOL) 650 MG 8 hr tablet Take 1,300 mg by mouth Every 8 (Eight) Hours As Needed for Mild Pain .     • AMITIZA 24 MCG capsule Take 1 capsule by mouth 2 (Two) Times a Day.     • azelastine (ASTELIN) 0.1 % nasal spray 2 sprays into the nostril(s) as directed by provider Daily. Use in each nostril as directed 1 each 12   • B Complex Vitamins (VITAMIN B COMPLEX PO) Take 1 tablet by mouth Daily.     • Calcium-Magnesium-Vitamin D (CALCIUM MAGNESIUM PO) Take  by mouth.     • CBD (cannabidiol) oral oil Take 1 mL by mouth 2 (Two) Times a Day.     • custom compounded cream Apply  topically. CUSTOM COMPOUNDED RECTAL SUPPOSITORIES  BELLADONNA/MORPHINE SULF 16.2/5MG   INSERT 1 SUPPOSITORY TWICE DAILY AS NEEDED     • custom compounded cream Apply  topically. CUSTOM COMPOUNDED RECTAL SUPPOSITORY  HYDROCORTISONE/LIDOCAINE 2.5% / 2%  INSERT 1 SUPPOSITORY TWICE DAILY AS NEEDED     • DHA-EPA-Vitamin E (OMEGA-3 COMPLEX PO) Take 1 capsule by mouth Daily.     • diclofenac (VOLTAREN) 1 % gel gel Apply 4 g topically to the appropriate area as directed 4 (Four) Times a Day As Needed (PAIN). 100 g 5   • Dietary Management Product (VASCULERA PO) Take 1 tablet by mouth Daily.     • Diphenhydramine-APAP, sleep, (TYLENOL PM EXTRA STRENGTH PO) Take 2 tablets by mouth At Night As Needed.     • doxazosin (CARDURA) 2 MG tablet TAKE ONE TABLET BY MOUTH EVERY EVENING 90 tablet 1   • famotidine (PEPCID) 20 MG tablet Take 20 mg by mouth 2 (Two) Times a Day As Needed.     • fluticasone (FLONASE) 50 MCG/ACT nasal spray 2 sprays into each nostril Daily As Needed.     • gabapentin (NEURONTIN) 100 MG capsule TAKE 3 CAPSULES AT NOON AND 5 CAPSULES  AT HS     • Lidocaine, Anorectal, (RECTICARE) 5 % cream cream Apply  topically 4 (Four) Times a Day As Needed.     • Loratadine-Pseudoephedrine (CLARITIN-D 12 HOUR PO) Take 1 tablet by mouth Daily As Needed.     • montelukast (SINGULAIR) 10 MG tablet TAKE ONE TABLET BY MOUTH DAILY 90 tablet 1   • pantoprazole (PROTONIX) 40 MG EC tablet Take 1 tablet by mouth Daily. 90 tablet 1   • polyethylene glycol (MIRALAX) powder Take 17 g by mouth Daily As Needed (constipation).     • Probiotic Product (PROBIOTIC DAILY PO) Take 1 tablet by mouth Daily.     • raloxifene (EVISTA) 60 MG tablet TAKE 1 TABLET DAILY 90 tablet 1   • Simethicone (GAS-X PO) Take  by mouth.     • sucralfate (CARAFATE) 1 g tablet Take 1 tablet by mouth 4 (Four) Times a Day. 120 tablet 0   • traMADol (ULTRAM) 50 MG tablet Take 1 tablet by mouth Daily As Needed for Moderate Pain . 30 tablet 3   • VITAMIN D, CHOLECALCIFEROL, PO Take  by mouth.     • Wheat Dextrin (BENEFIBER DRINK MIX PO) Take  by mouth.     • betamethasone, augmented, (DIPROLENE) 0.05 % ointment Apply  topically to the appropriate area as directed 2 (Two) Times a Day. 15 g 1   • cefdinir (OMNICEF) 300 MG capsule Take 1 capsule by mouth 2 (Two) Times a Day. 20 capsule 0   • diazePAM (VALIUM) 5 MG tablet One tablet one hour prior to procedure.  May repeat if needed 30 minutes prior to procedure. 2 tablet 0     No facility-administered medications prior to visit.        Patient Active Problem List   Diagnosis   • Rectal pain   • S/P hemorrhoidectomy 11/1/16   • Urinary retention   • Acute urinary retention       Advanced Care Planning:  Patient has an advance directive - a copy has been provided and is visible in patient header    Review of Systems    Compared to one year ago, the patient feels her physical health is worse.  Compared to one year ago, the patient feels her mental health is the same.  Worse due to joint pain  Reviewed chart for potential of high risk medication in the elderly:  "yes  Reviewed chart for potential of harmful drug interactions in the elderly:yes    Objective         Vitals:    08/16/19 1410   BP: 108/60   Pulse: 85   Resp: 16   Temp: 97.2 °F (36.2 °C)   TempSrc: Temporal   SpO2: 98%   Weight: 60.8 kg (134 lb)   Height: 156.2 cm (61.5\")   PainSc:   2   PainLoc: Hand   per patient she has had history of ovarian cancer at 16 yo then cryotherapy for LEEP later in life now with hysterectomy.     Body mass index is 24.91 kg/m².  Discussed the patient's BMI with her. The BMI is in the acceptable range.    Physical Exam   Pulmonary/Chest: She exhibits no mass, no tenderness, no laceration, no crepitus, no edema, no deformity, no swelling and no retraction. Right breast exhibits no inverted nipple, no mass, no nipple discharge, no skin change and no tenderness. Left breast exhibits no inverted nipple, no mass, no nipple discharge, no skin change and no tenderness. Breasts are symmetrical. There is no breast swelling.   Abdominal: Hernia confirmed negative in the right inguinal area and confirmed negative in the left inguinal area.   Genitourinary: Vagina normal. No breast tenderness, discharge or bleeding. Pelvic exam was performed with patient supine. There is no rash, tenderness, lesion or injury on the right labia. There is no rash, tenderness, lesion or injury on the left labia.   Lymphadenopathy: No inguinal adenopathy noted on the right or left side.   Nursing note and vitals reviewed.    PPB with pap due to abnormal pap pmh with LEEP and pmh of ovarian cancern        Assessment/Plan   Medicare Risks and Personalized Health Plan  CMS Preventative Services Quick Reference  Depression/Dysphoria  Fall Risk  Polypharmacy     p23 and hep c test   The above risks/problems have been discussed with the patient.  Pertinent information has been shared with the patient in the After Visit Summary.  Follow up plans and orders are seen below in the Assessment/Plan Section.    Diagnoses and all " orders for this visit:    1. Initial Medicare annual wellness visit (Primary)    2. Need for hepatitis C screening test  -     Hepatitis C Antibody    3. Immunization due  -     Pneumococcal Polysaccharide Vaccine 23-Valent Greater Than or Equal To 1yo Subcutaneous / IM    4. History of ovarian cancer  -     Liquid-based Pap Smear, Screening; Future    5. Cervical cancer screening  -     Liquid-based Pap Smear, Screening; Future    Encouraged shingles vaccination at pharmacy  Follow Up:  Return in about 1 year (around 8/16/2020) for Medicare Wellness subseq.     An After Visit Summary and PPPS were given to the patient.

## 2019-08-16 NOTE — PATIENT INSTRUCTIONS
Medicare Wellness  Personal Prevention Plan of Service     Date of Office Visit:  2019  Encounter Provider:  ALIN Castrejon  Place of Service:  [facility]  Patient Name: Brandi Duncan  :  1945    As part of the Medicare Wellness portion of your visit today, we are providing you with this personalized preventive plan of services (PPPS). This plan is based upon recommendations of the [place] Preventive Services Task Force (USPSTF) and the Advisory Committee on Immunization Practices (ACIP).    This lists the preventive care services that should be considered, and provides dates of when you are due. Items listed as completed are up-to-date and do not require any further intervention.    Health Maintenance   Topic Date Due   • TDAP/TD VACCINES (1 - Tdap) 1964   • ZOSTER VACCINE (1 of 2) 1995   • HEPATITIS C SCREENING  2018   • MEDICARE ANNUAL WELLNESS  2018   • PNEUMOCOCCAL VACCINES (65+ LOW/MEDIUM RISK) (2 of 2 - PPSV23) 2019   • INFLUENZA VACCINE  2019   • DXA SCAN  2020   • MAMMOGRAM  2020   • COLONOSCOPY  10/26/2023       No orders of the defined types were placed in this encounter.      No Follow-up on file.

## 2019-08-17 LAB — HCV AB SER DONR QL: NORMAL

## 2019-08-24 NOTE — PATIENT INSTRUCTIONS
Food Choices for Gastroesophageal Reflux Disease, Adult  When you have gastroesophageal reflux disease (GERD), the foods you eat and your eating habits are very important. Choosing the right foods can help ease your discomfort. Think about working with a nutrition specialist (dietitian) to help you make good choices.  What are tips for following this plan?    Meals  · Choose healthy foods that are low in fat, such as fruits, vegetables, whole grains, low-fat dairy products, and lean meat, fish, and poultry.  · Eat small meals often instead of 3 large meals a day. Eat your meals slowly, and in a place where you are relaxed. Avoid bending over or lying down until 2-3 hours after eating.  · Avoid eating meals 2-3 hours before bed.  · Avoid drinking a lot of liquid with meals.  · Cook foods using methods other than frying. Bake, grill, or broil food instead.  · Avoid or limit:  ? Chocolate.  ? Peppermint or spearmint.  ? Alcohol.  ? Pepper.  ? Black and decaffeinated coffee.  ? Black and decaffeinated tea.  ? Bubbly (carbonated) soft drinks.  ? Caffeinated energy drinks and soft drinks.  · Limit high-fat foods such as:  ? Fatty meat or fried foods.  ? Whole milk, cream, butter, or ice cream.  ? Nuts and nut butters.  ? Pastries, donuts, and sweets made with butter or shortening.  · Avoid foods that cause symptoms. These foods may be different for everyone. Common foods that cause symptoms include:  ? Tomatoes.  ? Oranges, christina, and limes.  ? Peppers.  ? Spicy food.  ? Onions and garlic.  ? Vinegar.  Lifestyle  · Maintain a healthy weight. Ask your doctor what weight is healthy for you. If you need to lose weight, work with your doctor to do so safely.  · Exercise for at least 30 minutes for 5 or more days each week, or as told by your doctor.  · Wear loose-fitting clothes.  · Do not smoke. If you need help quitting, ask your doctor.  · Sleep with the head of your bed higher than your feet. Use a wedge under the  mattress or blocks under the bed frame to raise the head of the bed.  Summary  · When you have gastroesophageal reflux disease (GERD), food and lifestyle choices are very important in easing your symptoms.  · Eat small meals often instead of 3 large meals a day. Eat your meals slowly, and in a place where you are relaxed.  · Limit high-fat foods such as fatty meat or fried foods.  · Avoid bending over or lying down until 2-3 hours after eating.  · Avoid peppermint and spearmint, caffeine, alcohol, and chocolate.  This information is not intended to replace advice given to you by your health care provider. Make sure you discuss any questions you have with your health care provider.  Document Released: 06/18/2013 Document Revised: 01/23/2018 Document Reviewed: 01/23/2018  [employer] Interactive Patient Education © 2019 [employer]  constipation, Adult  Constipation is when a person:  · Poops (has a bowel movement) fewer times in a week than normal.  · Has a hard time pooping.  · Has poop that is dry, hard, or bigger than normal.  Follow these instructions at home:  Eating and drinking    · Eat foods that have a lot of fiber, such as:  ? Fresh fruits and vegetables.  ? Whole grains.  ? Beans.  · Eat less of foods that are high in fat, low in fiber, or overly processed, such as:  ? French fries.  ? Hamburgers.  ? Cookies.  ? Candy.  ? Soda.  · Drink enough fluid to keep your pee (urine) clear or pale yellow.  General instructions  · Exercise regularly or as told by your doctor.  · Go to the restroom when you feel like you need to poop. Do not hold it in.  · Take over-the-counter and prescription medicines only as told by your doctor. These include any fiber supplements.  · Do pelvic floor retraining exercises, such as:  ? Doing deep breathing while relaxing your lower belly (abdomen).  ? Relaxing your pelvic floor while pooping.  · Watch your condition for any changes.  · Keep all follow-up visits as told by your  doctor. This is important.  Contact a doctor if:  · You have pain that gets worse.  · You have a fever.  · You have not pooped for 4 days.  · You throw up (vomit).  · You are not hungry.  · You lose weight.  · You are bleeding from the anus.  · You have thin, pencil-like poop (stool).  Get help right away if:  · You have a fever, and your symptoms suddenly get worse.  · You leak poop or have blood in your poop.  · Your belly feels hard or bigger than normal (is bloated).  · You have very bad belly pain.  · You feel dizzy or you faint.  This information is not intended to replace advice given to you by your health care provider. Make sure you discuss any questions you have with your health care provider.  Document Released: 06/05/2009 Document Revised: 07/07/2017 Document Reviewed: 06/07/2017  [employer] Interactive Patient Education © 2019 [employer]

## 2019-09-02 ENCOUNTER — HOSPITAL ENCOUNTER (OUTPATIENT)
Dept: GENERAL RADIOLOGY | Facility: HOSPITAL | Age: 74
Discharge: HOME | End: 2019-09-02
Admitting: COLON & RECTAL SURGERY

## 2019-09-02 ENCOUNTER — TRANSCRIBE ORDERS (OUTPATIENT)
Dept: GENERAL RADIOLOGY | Facility: HOSPITAL | Age: 74
End: 2019-09-02

## 2019-09-02 DIAGNOSIS — K59.01 SLOW TRANSIT CONSTIPATION: ICD-10-CM

## 2019-09-02 DIAGNOSIS — K59.01 SLOW TRANSIT CONSTIPATION: Primary | ICD-10-CM

## 2019-09-02 PROCEDURE — 74018 RADEX ABDOMEN 1 VIEW: CPT

## 2019-09-04 ENCOUNTER — TRANSCRIBE ORDERS (OUTPATIENT)
Dept: GENERAL RADIOLOGY | Facility: HOSPITAL | Age: 74
End: 2019-09-04

## 2019-09-04 ENCOUNTER — HOSPITAL ENCOUNTER (OUTPATIENT)
Dept: GENERAL RADIOLOGY | Facility: HOSPITAL | Age: 74
Discharge: HOME | End: 2019-09-04
Admitting: COLON & RECTAL SURGERY

## 2019-09-04 DIAGNOSIS — K21.9 HIATAL HERNIA WITH GERD: ICD-10-CM

## 2019-09-04 DIAGNOSIS — K44.9 HIATAL HERNIA WITH GERD: ICD-10-CM

## 2019-09-04 DIAGNOSIS — K59.01 SLOW TRANSIT CONSTIPATION: Primary | ICD-10-CM

## 2019-09-04 DIAGNOSIS — K59.01 SLOW TRANSIT CONSTIPATION: ICD-10-CM

## 2019-09-04 PROCEDURE — 74018 RADEX ABDOMEN 1 VIEW: CPT

## 2019-09-04 RX ORDER — SUCRALFATE 1 G/1
TABLET ORAL
Qty: 120 TABLET | Refills: 0 | Status: SHIPPED | OUTPATIENT
Start: 2019-09-04 | End: 2019-10-04 | Stop reason: SDUPTHER

## 2019-09-05 ENCOUNTER — OFFICE VISIT (OUTPATIENT)
Dept: INTERNAL MEDICINE | Facility: CLINIC | Age: 74
End: 2019-09-05

## 2019-09-05 VITALS
BODY MASS INDEX: 25.28 KG/M2 | WEIGHT: 136 LBS | TEMPERATURE: 97.3 F | SYSTOLIC BLOOD PRESSURE: 132 MMHG | DIASTOLIC BLOOD PRESSURE: 70 MMHG | RESPIRATION RATE: 16 BRPM | HEART RATE: 68 BPM

## 2019-09-05 DIAGNOSIS — M54.50 CHRONIC MIDLINE LOW BACK PAIN WITHOUT SCIATICA: ICD-10-CM

## 2019-09-05 DIAGNOSIS — D64.9 ANEMIA, UNSPECIFIED TYPE: ICD-10-CM

## 2019-09-05 DIAGNOSIS — G89.29 CHRONIC MIDLINE LOW BACK PAIN WITHOUT SCIATICA: ICD-10-CM

## 2019-09-05 DIAGNOSIS — Z00.00 HEALTHCARE MAINTENANCE: ICD-10-CM

## 2019-09-05 DIAGNOSIS — K21.9 GASTROESOPHAGEAL REFLUX DISEASE WITHOUT ESOPHAGITIS: Primary | ICD-10-CM

## 2019-09-05 LAB
ALBUMIN SERPL-MCNC: 4.1 G/DL (ref 3.5–5.2)
ALBUMIN/GLOB SERPL: 1.4 G/DL
ALP SERPL-CCNC: 38 U/L (ref 39–117)
ALT SERPL W P-5'-P-CCNC: 9 U/L (ref 1–33)
ANION GAP SERPL CALCULATED.3IONS-SCNC: 8.9 MMOL/L (ref 5–15)
AST SERPL-CCNC: 19 U/L (ref 1–32)
BASOPHILS # BLD AUTO: 0.07 10*3/MM3 (ref 0–0.2)
BASOPHILS NFR BLD AUTO: 1.6 % (ref 0–1.5)
BILIRUB BLD-MCNC: NEGATIVE MG/DL
BILIRUB SERPL-MCNC: 0.3 MG/DL (ref 0.2–1.2)
BUN BLD-MCNC: 10 MG/DL (ref 8–23)
BUN/CREAT SERPL: 16.4 (ref 7–25)
CALCIUM SPEC-SCNC: 9.1 MG/DL (ref 8.6–10.5)
CHLORIDE SERPL-SCNC: 96 MMOL/L (ref 98–107)
CHOLEST SERPL-MCNC: 183 MG/DL (ref 0–200)
CLARITY, POC: CLEAR
CO2 SERPL-SCNC: 28.1 MMOL/L (ref 22–29)
COLOR UR: YELLOW
CREAT BLD-MCNC: 0.61 MG/DL (ref 0.57–1)
DEPRECATED RDW RBC AUTO: 47 FL (ref 37–54)
EOSINOPHIL # BLD AUTO: 0.09 10*3/MM3 (ref 0–0.4)
EOSINOPHIL NFR BLD AUTO: 2.1 % (ref 0.3–6.2)
ERYTHROCYTE [DISTWIDTH] IN BLOOD BY AUTOMATED COUNT: 13.4 % (ref 12.3–15.4)
EXPIRATION DATE: NORMAL
FERRITIN SERPL-MCNC: 25.1 NG/ML (ref 13–150)
GFR SERPL CREATININE-BSD FRML MDRD: 96 ML/MIN/1.73
GLOBULIN UR ELPH-MCNC: 2.9 GM/DL
GLUCOSE BLD-MCNC: 90 MG/DL (ref 65–99)
GLUCOSE UR STRIP-MCNC: NEGATIVE MG/DL
HCT VFR BLD AUTO: 33.8 % (ref 34–46.6)
HDLC SERPL-MCNC: 82 MG/DL (ref 40–60)
HGB BLD-MCNC: 10.8 G/DL (ref 12–15.9)
IMM GRANULOCYTES # BLD AUTO: 0.02 10*3/MM3 (ref 0–0.05)
IMM GRANULOCYTES NFR BLD AUTO: 0.5 % (ref 0–0.5)
KETONES UR QL: NEGATIVE
LDLC SERPL CALC-MCNC: 87 MG/DL (ref 0–100)
LDLC/HDLC SERPL: 1.06 {RATIO}
LEUKOCYTE EST, POC: NEGATIVE
LYMPHOCYTES # BLD AUTO: 1.83 10*3/MM3 (ref 0.7–3.1)
LYMPHOCYTES NFR BLD AUTO: 42.4 % (ref 19.6–45.3)
Lab: NORMAL
MCH RBC QN AUTO: 30.6 PG (ref 26.6–33)
MCHC RBC AUTO-ENTMCNC: 32 G/DL (ref 31.5–35.7)
MCV RBC AUTO: 95.8 FL (ref 79–97)
MONOCYTES # BLD AUTO: 0.48 10*3/MM3 (ref 0.1–0.9)
MONOCYTES NFR BLD AUTO: 11.1 % (ref 5–12)
NEUTROPHILS # BLD AUTO: 1.83 10*3/MM3 (ref 1.7–7)
NEUTROPHILS NFR BLD AUTO: 42.3 % (ref 42.7–76)
NITRITE UR-MCNC: NEGATIVE MG/ML
NRBC BLD AUTO-RTO: 0 /100 WBC (ref 0–0.2)
PH UR: 8 [PH] (ref 5–8)
PLATELET # BLD AUTO: 312 10*3/MM3 (ref 140–450)
PMV BLD AUTO: 9.6 FL (ref 6–12)
POTASSIUM BLD-SCNC: 4.4 MMOL/L (ref 3.5–5.2)
PROT SERPL-MCNC: 7 G/DL (ref 6–8.5)
PROT UR STRIP-MCNC: NEGATIVE MG/DL
RBC # BLD AUTO: 3.53 10*6/MM3 (ref 3.77–5.28)
RBC # UR STRIP: NEGATIVE /UL
SODIUM BLD-SCNC: 133 MMOL/L (ref 136–145)
SP GR UR: 1 (ref 1–1.03)
TRIGL SERPL-MCNC: 72 MG/DL (ref 0–150)
TSH SERPL DL<=0.05 MIU/L-ACNC: 1.41 UIU/ML (ref 0.27–4.2)
UROBILINOGEN UR QL: NORMAL
VLDLC SERPL-MCNC: 14.4 MG/DL (ref 5–40)
WBC NRBC COR # BLD: 4.32 10*3/MM3 (ref 3.4–10.8)

## 2019-09-05 PROCEDURE — 36415 COLL VENOUS BLD VENIPUNCTURE: CPT | Performed by: INTERNAL MEDICINE

## 2019-09-05 PROCEDURE — 81003 URINALYSIS AUTO W/O SCOPE: CPT | Performed by: INTERNAL MEDICINE

## 2019-09-05 PROCEDURE — 80053 COMPREHEN METABOLIC PANEL: CPT | Performed by: INTERNAL MEDICINE

## 2019-09-05 PROCEDURE — 85025 COMPLETE CBC W/AUTO DIFF WBC: CPT | Performed by: INTERNAL MEDICINE

## 2019-09-05 PROCEDURE — 82728 ASSAY OF FERRITIN: CPT | Performed by: INTERNAL MEDICINE

## 2019-09-05 PROCEDURE — 80061 LIPID PANEL: CPT | Performed by: INTERNAL MEDICINE

## 2019-09-05 PROCEDURE — 84443 ASSAY THYROID STIM HORMONE: CPT | Performed by: INTERNAL MEDICINE

## 2019-09-05 PROCEDURE — 99214 OFFICE O/P EST MOD 30 MIN: CPT | Performed by: INTERNAL MEDICINE

## 2019-09-05 NOTE — PATIENT INSTRUCTIONS
Cholesterol    Cholesterol is a fat. Your body needs a small amount of cholesterol. Cholesterol (plaque) may build up in your blood vessels (arteries). That makes you more likely to have a heart attack or stroke.  You cannot feel your cholesterol level. Having a blood test is the only way to find out if your level is high. Keep your test results. Work with your doctor to keep your cholesterol at a good level.  What do the results mean?  · Total cholesterol is how much cholesterol is in your blood.  · LDL is bad cholesterol. This is the type that can build up. Try to have low LDL.  · HDL is good cholesterol. It cleans your blood vessels and carries LDL away. Try to have high HDL.  · Triglycerides are fat that the body can store or burn for energy.  What are good levels of cholesterol?  · Total cholesterol below 200.  · LDL below 100 is good for people who have health risks. LDL below 70 is good for people who have very high risks.  · HDL above 40 is good. It is best to have HDL of 60 or higher.  · Triglycerides below 150.  How can I lower my cholesterol?  Diet  Follow your diet program as told by your doctor.  · Choose fish, white meat chicken, or turkey that is roasted or baked. Try not to eat red meat, fried foods, sausage, or lunch meats.  · Eat lots of fresh fruits and vegetables.  · Choose whole grains, beans, pasta, potatoes, and cereals.  · Choose olive oil, corn oil, or canola oil. Only use small amounts.  · Try not to eat butter, mayonnaise, shortening, or palm kernel oils.  · Try not to eat foods with trans fats.  · Choose low-fat or nonfat dairy foods.  ? Drink skim or nonfat milk.  ? Eat low-fat or nonfat yogurt and cheeses.  ? Try not to drink whole milk or cream.  ? Try not to eat ice cream, egg yolks, or full-fat cheeses.  · Healthy desserts include renee food cake, cindy snaps, animal crackers, hard candy, popsicles, and low-fat or nonfat frozen yogurt. Try not to eat pastries, cakes, pies, and  cookies.    Exercise  Follow your exercise program as told by your doctor.  · Be more active. Try gardening, walking, and taking the stairs.  · Ask your doctor about ways that you can be more active.  Medicine  · Take over-the-counter and prescription medicines only as told by your doctor.  This information is not intended to replace advice given to you by your health care provider. Make sure you discuss any questions you have with your health care provider.  Document Released: 03/16/2010 Document Revised: 07/19/2017 Document Reviewed: 06/29/2017  [employer] Interactive Patient Education © 2019 [employer]

## 2019-09-05 NOTE — PROGRESS NOTES
Chief Complaint   Patient presents with   • Extended follow up       History of Present Illness    The patient presents for a follow-up related to GERD. The patient is on famotidine and pantoprazole for her gastroesophageal reflux. The medication is taken on a regular basis and gives complete relief of the symptoms. She reports no abdominal pain, belching, chest pain, diarrhea, dysphagia, early satiety, heartburn, hoarseness, nausea, odynophagia, rectal bleeding, vomiting or weight loss. The GERD has no known aggravating factors.    The patient presents for a follow-up related to anemia. There are no reports of blood loss. The patient has no symptoms of a dry cough, a wet cough, wheezing, fever, a headache, myalgias, sweats, decreased appetite, chills, fatigue or paresthesias. The patient's energy level is normal. There are no reports of shortness of breath, palpitations or syncope.    The patient presents for follow-up of low back pain. She reports that the pain is stable. The current treatment consists of physical therapy, anti-inflammatories, injections, gabapentin, tylenol and CBD oil. She is currenlty in PT and is followed by ortho. She is scheduled for an injection.  She does not have associated fever, chills, weight loss, rashes, dysuria, hematuria, or a decreased urine stream. The pain does not radiate. There is no numbness. The patient denies loss of bladder control. The patient denies loss of bowel control. The patient reports no aggravating factors.    Review of Systems    CONSTITUTIONAL- Denies Weakness or Malaise.    NECK- Denies Decreased Range of Motion, Stiffness, Thyroid Nodules, Enlarged Lymph Nodes or Goiter.    EYES- Denies Eye Pain, Eye Drainage, Eye Redness, Eye Discharge, Decreased Vision, Visual Disturbance, Diplopia, Visual Loss or Swollen Eyelid.    HENT- Denies Nasal Discharge, Sore Throat, Ear Pain, Ear Drainage, Sinus Pain, Nasal Congestion, Decreased Hearing or Tinnitus.    PULMONARY-  Denies Sputum Production, Cough, Hemoptysis or Pleuritic Chest Pain.    GASTROINTESTINAL- Denies Constipation.    CARDIOVASCULAR- Denies Claudication, Edema or Irregular Heart Beat.    GENITOURINARY- Denies Dysuria, Hematuria, Urinary Frequency, Urinary Leakage, Pelvic Pain, Vaginal Prolapse, Vaginal Discharge, Dyspareunia or Abnormal Menses.    ENDOCRINE- Denies Cold Intolerance, Depression, Hair Loss, Heat Intolerance, Memory Loss, Polydypsia, Polyphagia, Polyuria, Sleep Disturbance or Weight Gain.    NEUROLOGIC- Denies Seizures, Visual Changes, Confusion or Excessive Daytime Sleepiness.    MUSCULOSKELETAL- Denies Joint Pain, Joint Stiffness, Decreased Range of Motion, Joint Swelling or Erythema of Joints.    INTEGUMENTARY- Denies Rash, Lumps, Sores, Itching, Dryness, Color Change, Changes in Hair, Brittle Nails, Discolored Nails, Thickened Nails, Growths or Alopecia.    Medications      Current Outpatient Medications:   •  acetaminophen (TYLENOL) 650 MG 8 hr tablet, Take 1,300 mg by mouth Every 8 (Eight) Hours As Needed for Mild Pain ., Disp: , Rfl:   •  AMITIZA 24 MCG capsule, Take 1 capsule by mouth 2 (Two) Times a Day., Disp: , Rfl:   •  azelastine (ASTELIN) 0.1 % nasal spray, 2 sprays into the nostril(s) as directed by provider Daily. Use in each nostril as directed, Disp: 1 each, Rfl: 12  •  B Complex Vitamins (VITAMIN B COMPLEX PO), Take 1 tablet by mouth Daily., Disp: , Rfl:   •  Calcium-Magnesium-Vitamin D (CALCIUM MAGNESIUM PO), Take  by mouth., Disp: , Rfl:   •  CBD (cannabidiol) oral oil, Take 1 mL by mouth 2 (Two) Times a Day., Disp: , Rfl:   •  custom compounded cream, Apply  topically. CUSTOM COMPOUNDED RECTAL SUPPOSITORIES BELLADONNA/MORPHINE SULF 16.2/5MG  INSERT 1 SUPPOSITORY TWICE DAILY AS NEEDED, Disp: , Rfl:   •  custom compounded cream, Apply  topically. CUSTOM COMPOUNDED RECTAL SUPPOSITORY HYDROCORTISONE/LIDOCAINE 2.5% / 2% INSERT 1 SUPPOSITORY TWICE DAILY AS NEEDED, Disp: , Rfl:   •   DHA-EPA-Vitamin E (OMEGA-3 COMPLEX PO), Take 1 capsule by mouth Daily., Disp: , Rfl:   •  diclofenac (VOLTAREN) 1 % gel gel, APPLY TO AFFECTED AREA(S) APPLY FOUR GRAMS TOPICALLY FOUR TIMES A DAY AS NEEDED FOR PAIN, Disp: 1 tube, Rfl: 2  •  Dietary Management Product (VASCULERA PO), Take 1 tablet by mouth Daily., Disp: , Rfl:   •  Diphenhydramine-APAP, sleep, (TYLENOL PM EXTRA STRENGTH PO), Take 2 tablets by mouth At Night As Needed., Disp: , Rfl:   •  doxazosin (CARDURA) 2 MG tablet, TAKE ONE TABLET BY MOUTH EVERY EVENING, Disp: 90 tablet, Rfl: 1  •  famotidine (PEPCID) 20 MG tablet, Take 20 mg by mouth 2 (Two) Times a Day As Needed., Disp: , Rfl:   •  fluticasone (FLONASE) 50 MCG/ACT nasal spray, 2 sprays into each nostril Daily As Needed., Disp: , Rfl:   •  gabapentin (NEURONTIN) 100 MG capsule, TAKE 3 CAPSULES AT NOON AND 5 CAPSULES AT HS, Disp: , Rfl:   •  Lidocaine, Anorectal, (RECTICARE) 5 % cream cream, Apply  topically 4 (Four) Times a Day As Needed., Disp: , Rfl:   •  Loratadine-Pseudoephedrine (CLARITIN-D 12 HOUR PO), Take 1 tablet by mouth Daily As Needed., Disp: , Rfl:   •  montelukast (SINGULAIR) 10 MG tablet, TAKE ONE TABLET BY MOUTH DAILY, Disp: 90 tablet, Rfl: 1  •  pantoprazole (PROTONIX) 40 MG EC tablet, Take 1 tablet by mouth Daily., Disp: 90 tablet, Rfl: 1  •  polyethylene glycol (MIRALAX) powder, Take 17 g by mouth Daily As Needed (constipation)., Disp: , Rfl:   •  Probiotic Product (PROBIOTIC DAILY PO), Take 1 tablet by mouth Daily., Disp: , Rfl:   •  raloxifene (EVISTA) 60 MG tablet, TAKE 1 TABLET DAILY, Disp: 90 tablet, Rfl: 1  •  Simethicone (GAS-X PO), Take  by mouth., Disp: , Rfl:   •  sucralfate (CARAFATE) 1 g tablet, TAKE ONE TABLET BY MOUTH FOUR TIMES A DAY, Disp: 120 tablet, Rfl: 0  •  traMADol (ULTRAM) 50 MG tablet, Take 1 tablet by mouth Daily As Needed for Moderate Pain ., Disp: 30 tablet, Rfl: 3  •  VITAMIN D, CHOLECALCIFEROL, PO, Take  by mouth., Disp: , Rfl:   •  Wheat Dextrin  (BENEFIBER DRINK MIX PO), Take  by mouth., Disp: , Rfl:      Allergies    No Known Allergies    Problem List    Patient Active Problem List   Diagnosis   • Rectal pain   • S/P hemorrhoidectomy 11/1/16   • Urinary retention   • Acute urinary retention       Medications, Allergies, Problems List and Past History were reviewed and updated.    Physical Examination    /70 (BP Location: Left arm, Patient Position: Sitting, Cuff Size: Adult)   Pulse 68   Temp 97.3 °F (36.3 °C) (Temporal)   Resp 16   Wt 61.7 kg (136 lb)   LMP  (LMP Unknown) Comment: LAST PAP 2015  Breastfeeding? No   BMI 25.28 kg/m²     HEENT: Head- Normocephalic Atraumatic. Facies- Within normal limits. Pinnas- Normal texture and shape bilaterally. Canals- Normal bilaterally. TMs- Normal bilaterally. Nares- Patent bilaterally. Nasal Septum- is normal. There is no tenderness to palpation over the frontal or maxillary sinuses. Lids- Normal bilaterally. Conjunctiva- Clear bilaterally. Sclera- Anicteric bilaterally. Oropharynx- Moist with no lesions. Tonsils- No enlargement, erythema or exudate.    Neck: Thyroid- non enlarged, symmetric and has no nodules. No bruits are detected. ROM- Normal Range of Motion with no rigidity.    Lungs: Auscultation- Clear to auscultation bilaterally. There are no retractions, clubbing or cyanosis. The Expiratory to Inspiratory ratio is equal.    Lymph Nodes: Cervical- no enlarged lymph nodes noted. Clavicular- no enlarged supraclavicular lymph nodes noted. Axillary- no enlarged axillary lymph nodes noted. Inguinal- no enlarged inguinal lymph nodes noted.    Cardiovascular: There are no carotid bruits. Heart- Normal Rate with Regular rhythm and no murmurs. There are no gallops. There are no rubs. In the lower extremities there is no edema. The upper extremities do not have edema.    Abdomen: Soft, benign, non-tender with no masses, hernias, organomegaly or scars.    Back: The patient has a normal spinal curvature  with no evidence of scoliosis. There are no skin lesions noted on the back. Palpation reveals no tenderness and normal muscle tone. The straight leg raising test is negative. The back has normal flexion, extension, rotation, and lateral bending.    Impression and Assessment    Gastroesophageal Reflux Disease.    Anemia.    Low Back Pain.    Plan    Gastroesophageal Reflux Disease Plan: The current plan was continued.    Low Back Pain Plan: The current plan was continued.    Anemia Plan: Further plans will be made after results of tests are available.    Brandi was seen today for extended follow up.    Diagnoses and all orders for this visit:    Gastroesophageal reflux disease without esophagitis  -     Comprehensive Metabolic Panel    Anemia, unspecified type  -     Comprehensive Metabolic Panel  -     CBC & Differential  -     Ferritin  -     TSH  -     POC Urinalysis Dipstick, Automated    Chronic midline low back pain without sciatica  -     Comprehensive Metabolic Panel  -     CBC & Differential    Healthcare maintenance  -     Lipid Panel        Return to Office    The patient was instructed to return for follow-up in 6 months.    The patient was instructed to return sooner if the condition changes, worsens, or does not resolve.

## 2019-09-06 ENCOUNTER — TRANSCRIBE ORDERS (OUTPATIENT)
Dept: GENERAL RADIOLOGY | Facility: HOSPITAL | Age: 74
End: 2019-09-06

## 2019-09-06 ENCOUNTER — HOSPITAL ENCOUNTER (OUTPATIENT)
Dept: GENERAL RADIOLOGY | Facility: HOSPITAL | Age: 74
Discharge: HOME | End: 2019-09-06
Admitting: COLON & RECTAL SURGERY

## 2019-09-06 ENCOUNTER — TRANSCRIBE ORDERS (OUTPATIENT)
Dept: ADMINISTRATIVE | Facility: HOSPITAL | Age: 74
End: 2019-09-06

## 2019-09-06 DIAGNOSIS — K59.01 SLOW TRANSIT CONSTIPATION: ICD-10-CM

## 2019-09-06 DIAGNOSIS — K59.01 SLOW TRANSIT CONSTIPATION: Primary | ICD-10-CM

## 2019-09-06 PROCEDURE — 74018 RADEX ABDOMEN 1 VIEW: CPT

## 2019-09-25 ENCOUNTER — HOSPITAL ENCOUNTER (OUTPATIENT)
Dept: MAMMOGRAPHY | Facility: HOSPITAL | Age: 74
Discharge: HOME | End: 2019-09-25
Admitting: INTERNAL MEDICINE

## 2019-09-25 DIAGNOSIS — Z12.31 VISIT FOR SCREENING MAMMOGRAM: ICD-10-CM

## 2019-09-25 PROCEDURE — 77063 BREAST TOMOSYNTHESIS BI: CPT

## 2019-09-25 PROCEDURE — 77063 BREAST TOMOSYNTHESIS BI: CPT | Performed by: RADIOLOGY

## 2019-09-25 PROCEDURE — 77067 SCR MAMMO BI INCL CAD: CPT | Performed by: RADIOLOGY

## 2019-09-25 PROCEDURE — 77067 SCR MAMMO BI INCL CAD: CPT

## 2019-09-25 RX ORDER — RALOXIFENE HYDROCHLORIDE 60 MG/1
TABLET, FILM COATED ORAL
Qty: 90 TABLET | Refills: 4 | Status: SHIPPED | OUTPATIENT
Start: 2019-09-25 | End: 2020-09-25

## 2019-09-30 ENCOUNTER — TELEPHONE (OUTPATIENT)
Dept: INTERNAL MEDICINE | Facility: CLINIC | Age: 74
End: 2019-09-30

## 2019-10-04 DIAGNOSIS — K44.9 HIATAL HERNIA WITH GERD: ICD-10-CM

## 2019-10-04 DIAGNOSIS — K21.9 HIATAL HERNIA WITH GERD: ICD-10-CM

## 2019-10-04 RX ORDER — SUCRALFATE 1 G/1
TABLET ORAL
Qty: 120 TABLET | Refills: 2 | Status: SHIPPED | OUTPATIENT
Start: 2019-10-04 | End: 2020-03-05

## 2019-10-21 RX ORDER — PANTOPRAZOLE SODIUM 40 MG/1
TABLET, DELAYED RELEASE ORAL
Qty: 90 TABLET | Refills: 1 | Status: SHIPPED | OUTPATIENT
Start: 2019-10-21 | End: 2020-03-05

## 2020-01-06 ENCOUNTER — OFFICE VISIT (OUTPATIENT)
Dept: INTERNAL MEDICINE | Facility: CLINIC | Age: 75
End: 2020-01-06

## 2020-01-06 ENCOUNTER — TELEPHONE (OUTPATIENT)
Dept: INTERNAL MEDICINE | Facility: CLINIC | Age: 75
End: 2020-01-06

## 2020-01-06 VITALS
RESPIRATION RATE: 16 BRPM | WEIGHT: 138.25 LBS | OXYGEN SATURATION: 97 % | SYSTOLIC BLOOD PRESSURE: 110 MMHG | HEART RATE: 71 BPM | DIASTOLIC BLOOD PRESSURE: 70 MMHG | TEMPERATURE: 98.2 F | BODY MASS INDEX: 25.7 KG/M2

## 2020-01-06 DIAGNOSIS — M54.50 CHRONIC LOW BACK PAIN WITHOUT SCIATICA, UNSPECIFIED BACK PAIN LATERALITY: Primary | ICD-10-CM

## 2020-01-06 DIAGNOSIS — G89.29 CHRONIC LOW BACK PAIN WITHOUT SCIATICA, UNSPECIFIED BACK PAIN LATERALITY: Primary | ICD-10-CM

## 2020-01-06 DIAGNOSIS — M41.9 SCOLIOSIS, UNSPECIFIED SCOLIOSIS TYPE, UNSPECIFIED SPINAL REGION: ICD-10-CM

## 2020-01-06 DIAGNOSIS — M19.91 PRIMARY OSTEOARTHRITIS, UNSPECIFIED SITE: ICD-10-CM

## 2020-01-06 PROCEDURE — 99213 OFFICE O/P EST LOW 20 MIN: CPT | Performed by: NURSE PRACTITIONER

## 2020-01-06 RX ORDER — DIAZEPAM 2.5 MG/.5ML
5 GEL RECTAL NIGHTLY PRN
COMMUNITY

## 2020-01-06 NOTE — PROGRESS NOTES
Chief Complaint   Patient presents with   • Pain     pain management        Subjective     History of Present Illness   Patient is here today for consult in regards to pain management for chronic pain specifically back pain pain.    The patient has pain in hands and plans to have a surgery on thumbs L then R hand.     She has a bad back and back pain and has severe scoliosis.    Dr Myers prescribed tramadol one year ago and has taken one only prescription of this medication.  She has been taking half a tablet occasionally but more recently she is taken 1/2 tablet every 6 hours.  She takes 2 Tylenol arthritis in the morning and a Tylenol PM at bedtime.  This regimen had been managing her pain until recent.  She is doing physical therapy exercises at home and would like to have a physical therapist who specializes in scoliosis/backs evaluate and treat her..  She has been to PT last at [facility] and she has message and chiropractor.   Pain level is a 5 of 1-10 all the time.           The following portions of the patient's history were reviewed and updated as appropriate: allergies, current medications, past family history, past medical history, past social history, past surgical history and problem list.    Review of Systems   Constitutional: Negative.    Respiratory: Negative.    Cardiovascular: Negative.    Musculoskeletal: Positive for back pain.        B hand pain   Skin: Negative.    Psychiatric/Behavioral: Negative.        Objective   Physical Exam   Constitutional: She is oriented to person, place, and time. She appears well-developed and well-nourished.   Cardiovascular: Normal rate and regular rhythm.   Pulmonary/Chest: Effort normal and breath sounds normal.   Musculoskeletal:   Free range of motion bilateral lower extremities muscle strength 5 of 5 in all major muscle groups DTRs 2+ negative straight leg raise patient can heel and toe walk.   Neurological: She is alert and oriented to person, place,  and time.   B hand pain with multiple swollen joints.    Skin: Skin is warm and dry. Capillary refill takes 2 to 3 seconds.   Psychiatric: She has a normal mood and affect. Her behavior is normal.   Nursing note and vitals reviewed.           Assessment/Plan   Brandi was seen today for pain.    Diagnoses and all orders for this visit:    Chronic low back pain without sciatica, unspecified back pain laterality  -     Ambulatory Referral to Physical Therapy Evaluate and treat  -     Ambulatory Referral to Pain Management  -     traMADol (ULTRAM) 50 MG tablet; Take 1 tablet by mouth Every 12 (Twelve) Hours As Needed for Moderate Pain .    Scoliosis, unspecified scoliosis type, unspecified spinal region  -     Ambulatory Referral to Physical Therapy Evaluate and treat  -     Ambulatory Referral to Pain Management    Primary osteoarthritis, unspecified site          Return if symptoms worsen or fail to improve.  RTC/call  If symptoms worsen  Meds MOA and SE's reviewed and pt v/u

## 2020-01-06 NOTE — TELEPHONE ENCOUNTER
Kayla can you call [facility] to see if they have a specific physical therapy group to use for their adult patients with scoliosis.  Please send this information to Gita white.

## 2020-01-06 NOTE — TELEPHONE ENCOUNTER
PT CALLED STATES THERE MUST BE A MISTAKE IN THE REFERRAL FOR PHYSICAL THERAPY  CAUSE  [facility] CALLED HER TO MAKE AN APPOINTMENT. SHE THOUGHT SHE WAS SEEING A SPECIALIST IN ADULT SCOLIOSIS. SHE STATES SHE DON'T WANT TO MAKE AN APPOINTMENT WITH JUST ANY PHYSICAL THERAPIST.    PLEASE ADVISE AND GIVE CALL BACK

## 2020-01-07 RX ORDER — TRAMADOL HYDROCHLORIDE 50 MG/1
50 TABLET ORAL EVERY 12 HOURS PRN
Qty: 60 TABLET | Refills: 2 | Status: SHIPPED | OUTPATIENT
Start: 2020-01-07 | End: 2020-03-14

## 2020-01-14 NOTE — TELEPHONE ENCOUNTER
Attempted to reach [facility] information at [phone] received vm

## 2020-01-15 NOTE — TELEPHONE ENCOUNTER
Please ask Gal if they have any physical therapist they know who treat adults with scoliosis.  They may need to send you to their physical therapy department for this information.

## 2020-01-15 NOTE — TELEPHONE ENCOUNTER
Spoke with Melissa at [facility] and they do not see any adult patients older than 18.

## 2020-01-16 NOTE — TELEPHONE ENCOUNTER
"Spoke with Karolyn at [facility], she states per the PT's there, they don't know of any \"specialist\" in scoliosis. They do PT for backs in adults and are more than happy to schedule her but don't know of any \"specialists\" in the area of scoliosis specifically. Want me to try the [facility]?  "

## 2020-01-16 NOTE — TELEPHONE ENCOUNTER
Please call our [facility] physical therapy department to see if there is any specialist that they are aware of the treats patients specifically who have scoliosis.  Please let them know this is the patient's request.

## 2020-01-16 NOTE — TELEPHONE ENCOUNTER
Spoke to Melissa agosto [facility] she stated that she was not familiar with any physical therapist that treat adults with scoliosis.

## 2020-01-17 ENCOUNTER — TELEPHONE (OUTPATIENT)
Dept: INTERNAL MEDICINE | Facility: CLINIC | Age: 75
End: 2020-01-17

## 2020-01-17 DIAGNOSIS — M19.91 PRIMARY OSTEOARTHRITIS, UNSPECIFIED SITE: ICD-10-CM

## 2020-01-17 DIAGNOSIS — G89.29 CHRONIC LOW BACK PAIN WITHOUT SCIATICA, UNSPECIFIED BACK PAIN LATERALITY: Primary | ICD-10-CM

## 2020-01-17 DIAGNOSIS — M41.9 SCOLIOSIS, UNSPECIFIED SCOLIOSIS TYPE, UNSPECIFIED SPINAL REGION: ICD-10-CM

## 2020-01-17 DIAGNOSIS — M54.50 CHRONIC LOW BACK PAIN WITHOUT SCIATICA, UNSPECIFIED BACK PAIN LATERALITY: Primary | ICD-10-CM

## 2020-01-17 NOTE — TELEPHONE ENCOUNTER
Please let the patient know all the information noted below in the attempts we have made to find a specific physical therapist who specializes in patients with scoliosis.  We have not been able to locate anyone like that and I would be glad to set her up with standard physical therapy if that is something she is inclined to do.  Please see how the patient is feeling at this time.

## 2020-01-17 NOTE — TELEPHONE ENCOUNTER
Pt is requesting a referral for physical therapy to a different provider.  Pt is wanting to see Princess Mcnamara at [facility] in [place], [place].    Princess Mcnamara  [facility]  Office- [phone]  Fax- [phone]

## 2020-01-17 NOTE — TELEPHONE ENCOUNTER
Spoke with member at the [facility] [phone].     She stated they do have doctors that specialize in scoliosis with adults. They are more than happy to see her but only take referrals over the phone. She said generally they refer out for PT if needed but they do have exercises and such in the office that they discuss with their patients to work on at home.

## 2020-01-17 NOTE — TELEPHONE ENCOUNTER
"I spoke with pt who states she called today and spoke with a \"man and informed him he found someone\" She states she found a PT who deals with scoliosis, [facility] in [facility]. Phone number is [phone] and fax [phone].   "

## 2020-01-21 ENCOUNTER — TELEPHONE (OUTPATIENT)
Dept: INTERNAL MEDICINE | Facility: CLINIC | Age: 75
End: 2020-01-21

## 2020-01-21 DIAGNOSIS — G62.9 PERIPHERAL POLYNEUROPATHY: Primary | ICD-10-CM

## 2020-01-21 NOTE — TELEPHONE ENCOUNTER
Pt sees Dr Fleming now and is not sure she is real happy with him-is there another neurologist you would recommend?

## 2020-01-27 NOTE — TELEPHONE ENCOUNTER
Reached pt at [phone] and told her that Dr Myers recommended [facility]-she asked if you have a specific neurologist that you would recommend-she has been happy with every other specific recommendation you have had.  She really wants a name

## 2020-01-27 NOTE — TELEPHONE ENCOUNTER
Please let her know that I would recommend [facility].  Please schedule.  Abram Myers MD  6:56 AM  01/27/20

## 2020-01-28 NOTE — TELEPHONE ENCOUNTER
Reached pt at [phone]-she is fine with Dr Myers's recommendations and referral has been routed

## 2020-02-06 PROBLEM — G89.29 CHRONIC LOW BACK PAIN: Status: ACTIVE | Noted: 2020-02-06

## 2020-02-06 PROBLEM — M54.50 CHRONIC LOW BACK PAIN: Status: ACTIVE | Noted: 2020-02-06

## 2020-02-07 RX ORDER — MONTELUKAST SODIUM 10 MG/1
TABLET ORAL
Qty: 90 TABLET | Refills: 0 | Status: SHIPPED | OUTPATIENT
Start: 2020-02-07 | End: 2020-04-04

## 2020-02-10 RX ORDER — DOXAZOSIN 2 MG/1
TABLET ORAL
Qty: 90 TABLET | Refills: 1 | Status: SHIPPED | OUTPATIENT
Start: 2020-02-10 | End: 2020-08-10

## 2020-03-05 ENCOUNTER — OFFICE VISIT (OUTPATIENT)
Dept: INTERNAL MEDICINE | Facility: CLINIC | Age: 75
End: 2020-03-05

## 2020-03-05 VITALS
SYSTOLIC BLOOD PRESSURE: 126 MMHG | TEMPERATURE: 98.5 F | RESPIRATION RATE: 16 BRPM | WEIGHT: 134 LBS | HEART RATE: 76 BPM | BODY MASS INDEX: 24.91 KG/M2 | DIASTOLIC BLOOD PRESSURE: 58 MMHG

## 2020-03-05 DIAGNOSIS — D64.9 ANEMIA, UNSPECIFIED TYPE: ICD-10-CM

## 2020-03-05 DIAGNOSIS — Z23 IMMUNIZATION DUE: ICD-10-CM

## 2020-03-05 DIAGNOSIS — M54.50 CHRONIC MIDLINE LOW BACK PAIN WITHOUT SCIATICA: ICD-10-CM

## 2020-03-05 DIAGNOSIS — K21.9 GASTROESOPHAGEAL REFLUX DISEASE WITHOUT ESOPHAGITIS: Primary | ICD-10-CM

## 2020-03-05 DIAGNOSIS — G89.29 CHRONIC MIDLINE LOW BACK PAIN WITHOUT SCIATICA: ICD-10-CM

## 2020-03-05 LAB
ALBUMIN SERPL-MCNC: 4.1 G/DL (ref 3.5–5.2)
ALBUMIN/GLOB SERPL: 1.5 G/DL
ALP SERPL-CCNC: 38 U/L (ref 39–117)
ALT SERPL W P-5'-P-CCNC: 9 U/L (ref 1–33)
ANION GAP SERPL CALCULATED.3IONS-SCNC: 12.7 MMOL/L (ref 5–15)
AST SERPL-CCNC: 16 U/L (ref 1–32)
BASOPHILS # BLD AUTO: 0.05 10*3/MM3 (ref 0–0.2)
BASOPHILS NFR BLD AUTO: 1.1 % (ref 0–1.5)
BILIRUB SERPL-MCNC: 0.2 MG/DL (ref 0.2–1.2)
BUN BLD-MCNC: 11 MG/DL (ref 8–23)
BUN/CREAT SERPL: 15.9 (ref 7–25)
CALCIUM SPEC-SCNC: 9.4 MG/DL (ref 8.6–10.5)
CHLORIDE SERPL-SCNC: 97 MMOL/L (ref 98–107)
CO2 SERPL-SCNC: 25.3 MMOL/L (ref 22–29)
CREAT BLD-MCNC: 0.69 MG/DL (ref 0.57–1)
DEPRECATED RDW RBC AUTO: 39.5 FL (ref 37–54)
EOSINOPHIL # BLD AUTO: 0.1 10*3/MM3 (ref 0–0.4)
EOSINOPHIL NFR BLD AUTO: 2.3 % (ref 0.3–6.2)
ERYTHROCYTE [DISTWIDTH] IN BLOOD BY AUTOMATED COUNT: 12.5 % (ref 12.3–15.4)
GFR SERPL CREATININE-BSD FRML MDRD: 83 ML/MIN/1.73
GLOBULIN UR ELPH-MCNC: 2.8 GM/DL
GLUCOSE BLD-MCNC: 88 MG/DL (ref 65–99)
HCT VFR BLD AUTO: 30.8 % (ref 34–46.6)
HGB BLD-MCNC: 10.5 G/DL (ref 12–15.9)
IMM GRANULOCYTES # BLD AUTO: 0.01 10*3/MM3 (ref 0–0.05)
IMM GRANULOCYTES NFR BLD AUTO: 0.2 % (ref 0–0.5)
LYMPHOCYTES # BLD AUTO: 1.48 10*3/MM3 (ref 0.7–3.1)
LYMPHOCYTES NFR BLD AUTO: 33.6 % (ref 19.6–45.3)
MCH RBC QN AUTO: 29.6 PG (ref 26.6–33)
MCHC RBC AUTO-ENTMCNC: 34.1 G/DL (ref 31.5–35.7)
MCV RBC AUTO: 86.8 FL (ref 79–97)
MONOCYTES # BLD AUTO: 0.49 10*3/MM3 (ref 0.1–0.9)
MONOCYTES NFR BLD AUTO: 11.1 % (ref 5–12)
NEUTROPHILS # BLD AUTO: 2.27 10*3/MM3 (ref 1.7–7)
NEUTROPHILS NFR BLD AUTO: 51.7 % (ref 42.7–76)
NRBC BLD AUTO-RTO: 0 /100 WBC (ref 0–0.2)
PLATELET # BLD AUTO: 258 10*3/MM3 (ref 140–450)
PMV BLD AUTO: 9.9 FL (ref 6–12)
POTASSIUM BLD-SCNC: 4.4 MMOL/L (ref 3.5–5.2)
PROT SERPL-MCNC: 6.9 G/DL (ref 6–8.5)
RBC # BLD AUTO: 3.55 10*6/MM3 (ref 3.77–5.28)
SODIUM BLD-SCNC: 135 MMOL/L (ref 136–145)
WBC NRBC COR # BLD: 4.4 10*3/MM3 (ref 3.4–10.8)

## 2020-03-05 PROCEDURE — 80053 COMPREHEN METABOLIC PANEL: CPT | Performed by: INTERNAL MEDICINE

## 2020-03-05 PROCEDURE — 85025 COMPLETE CBC W/AUTO DIFF WBC: CPT | Performed by: INTERNAL MEDICINE

## 2020-03-05 PROCEDURE — 36415 COLL VENOUS BLD VENIPUNCTURE: CPT | Performed by: INTERNAL MEDICINE

## 2020-03-05 PROCEDURE — 99214 OFFICE O/P EST MOD 30 MIN: CPT | Performed by: INTERNAL MEDICINE

## 2020-03-05 PROCEDURE — 90653 IIV ADJUVANT VACCINE IM: CPT | Performed by: INTERNAL MEDICINE

## 2020-03-05 PROCEDURE — G0008 ADMIN INFLUENZA VIRUS VAC: HCPCS | Performed by: INTERNAL MEDICINE

## 2020-03-05 NOTE — PROGRESS NOTES
Chief Complaint   Patient presents with   • Follow-up     6 month follow up chronic medical problems        History of Present Illness    The patient presents for a follow-up related to GERD. The patient is on Pepcid for her gastroesophageal reflux. Pantoprazole caused nausea. The medication is taken on a regular basis and gives complete relief of the symptoms. She reports no abdominal pain, belching, chest pain, diarrhea, dysphagia, early satiety, heartburn, hoarseness, nausea, odynophagia, rectal bleeding, vomiting or weight loss. The GERD has no known aggravating factors.    The patient presents for a follow-up related to anemia. There are no reports of blood loss. The patient has no symptoms of a dry cough, a wet cough, wheezing, fever, a headache, myalgias, sweats, decreased appetite, chills, fatigue or paresthesias. The patient's energy level is normal. There are no reports of shortness of breath, palpitations or syncope.    The patient presents for follow-up of low back pain. She reports that the pain is stable. The current treatment consists of tramadol and gabapentin.  She does not have associated fever, chills, weight loss, rashes, dysuria, hematuria, or a decreased urine stream. The pain does not radiate. There is no numbness. The patient denies loss of bladder control. The patient denies loss of bowel control. The patient reports no aggravating factors.    Review of Systems    CONSTITUTIONAL- Denies Weakness or Malaise.    HENT- Denies Nasal Discharge, Sore Throat, Ear Pain, Ear Drainage, Sinus Pain, Nasal Congestion, Decreased Hearing or Tinnitus.    GASTROINTESTINAL- Denies Constipation.    PULMONARY- Denies Sputum Production, Cough, Hemoptysis or Pleuritic Chest Pain.    CARDIOVASCULAR- Denies Claudication, Edema or Irregular Heart Beat.    Medications      Current Outpatient Medications:   •  acetaminophen (TYLENOL) 650 MG 8 hr tablet, Take 1,300 mg by mouth Every 8 (Eight) Hours As Needed for Mild  Pain ., Disp: , Rfl:   •  AMITIZA 24 MCG capsule, Take 1 capsule by mouth 2 (Two) Times a Day., Disp: , Rfl:   •  B Complex Vitamins (VITAMIN B COMPLEX PO), Take 1 tablet by mouth Daily., Disp: , Rfl:   •  Calcium-Magnesium-Vitamin D (CALCIUM MAGNESIUM PO), Take  by mouth., Disp: , Rfl:   •  custom compounded cream, Apply  topically. CUSTOM COMPOUNDED RECTAL SUPPOSITORIES BELLADONNA/MORPHINE SULF 16.2/5MG  INSERT 1 SUPPOSITORY TWICE DAILY AS NEEDED, Disp: , Rfl:   •  custom compounded cream, Apply  topically. CUSTOM COMPOUNDED RECTAL SUPPOSITORY HYDROCORTISONE/LIDOCAINE 2.5% / 2% INSERT 1 SUPPOSITORY TWICE DAILY AS NEEDED, Disp: , Rfl:   •  DHA-EPA-Vitamin E (OMEGA-3 COMPLEX PO), Take 1 capsule by mouth Daily., Disp: , Rfl:   •  diazePAM (DIASTAT) 2.5 MG gel, Insert 5 mg into the rectum At Night As Needed., Disp: , Rfl:   •  diclofenac (VOLTAREN) 1 % gel gel, Apply four grams topically to affected areas four times a day as needed for pain., Disp: 100 g, Rfl: 1  •  Dietary Management Product (VASCULERA PO), Take 1 tablet by mouth Daily., Disp: , Rfl:   •  Diphenhydramine-APAP, sleep, (TYLENOL PM EXTRA STRENGTH PO), Take 2 tablets by mouth At Night As Needed., Disp: , Rfl:   •  doxazosin (CARDURA) 2 MG tablet, TAKE ONE TABLET BY MOUTH EVERY EVENING, Disp: 90 tablet, Rfl: 1  •  famotidine (PEPCID) 20 MG tablet, Take 20 mg by mouth 2 (Two) Times a Day As Needed., Disp: , Rfl:   •  fluticasone (FLONASE) 50 MCG/ACT nasal spray, 2 sprays into each nostril Daily As Needed., Disp: , Rfl:   •  gabapentin (NEURONTIN) 100 MG capsule, TAKE 3 CAPSULES AT NOON AND 5 CAPSULES AT HS, Disp: , Rfl:   •  Lidocaine, Anorectal, (RECTICARE) 5 % cream cream, Apply  topically 4 (Four) Times a Day As Needed., Disp: , Rfl:   •  Loratadine-Pseudoephedrine (CLARITIN-D 12 HOUR PO), Take 1 tablet by mouth Daily As Needed., Disp: , Rfl:   •  MAGNESIUM OXIDE PO, Take  by mouth Daily., Disp: , Rfl:   •  montelukast (SINGULAIR) 10 MG tablet, TAKE ONE  TABLET BY MOUTH DAILY, Disp: 90 tablet, Rfl: 0  •  polyethylene glycol (MIRALAX) powder, Take 17 g by mouth Daily As Needed (constipation)., Disp: , Rfl:   •  Probiotic Product (PROBIOTIC DAILY PO), Take 1 tablet by mouth Daily., Disp: , Rfl:   •  raloxifene (EVISTA) 60 MG tablet, TAKE 1 TABLET DAILY, Disp: 90 tablet, Rfl: 4  •  Simethicone (GAS-X PO), Take  by mouth., Disp: , Rfl:   •  traMADol (ULTRAM) 50 MG tablet, Take 1 tablet by mouth Every 12 (Twelve) Hours As Needed for Moderate Pain ., Disp: 60 tablet, Rfl: 2  •  VITAMIN D, CHOLECALCIFEROL, PO, Take  by mouth., Disp: , Rfl:   •  Wheat Dextrin (BENEFIBER DRINK MIX PO), Take  by mouth., Disp: , Rfl:      Allergies    No Known Allergies    Problem List    Patient Active Problem List   Diagnosis   • Rectal pain   • S/P hemorrhoidectomy 11/1/16   • Urinary retention   • Acute urinary retention   • Chronic low back pain       Medications, Allergies, Problems List and Past History were reviewed and updated.    Physical Examination    /58 (BP Location: Right arm, Patient Position: Sitting, Cuff Size: Adult)   Pulse 76   Temp 98.5 °F (36.9 °C) (Temporal)   Resp 16   Wt 60.8 kg (134 lb)   LMP  (LMP Unknown) Comment: LAST PAP 2015  Breastfeeding No   BMI 24.91 kg/m²     HEENT: Facies- Within normal limits. Lids- Normal bilaterally. Conjunctiva- Clear bilaterally. Sclera- Anicteric bilaterally.    Neck: Thyroid- non enlarged, symmetric and has no nodules. No bruits are detected. ROM- Normal Range of Motion with no rigidity.    Lungs: Auscultation- Clear to auscultation bilaterally. There are no retractions, clubbing or cyanosis. The Expiratory to Inspiratory ratio is equal.    Lymph Nodes: Cervical- no enlarged lymph nodes noted.    Cardiovascular: There are no carotid bruits. Heart- Normal Rate with Regular rhythm and no murmurs. There are no gallops. There are no rubs. In the lower extremities there is no edema. The upper extremities do not have  edema.    Abdomen: Soft, benign, non-tender with no masses, hernias, organomegaly or scars.    Back: The patient has a normal spinal curvature with no evidence of scoliosis. There are no skin lesions noted on the back. Palpation reveals no tenderness and normal muscle tone. The straight leg raising test is negative. The back has normal flexion, extension, rotation, and lateral bending.    Impression and Assessment    Gastroesophageal Reflux Disease.    Anemia.    Low Back Pain.    Plan    Gastroesophageal Reflux Disease Plan: The current plan was continued.    Low Back Pain Plan: The current plan was continued.    Anemia Plan: Further plans will be made after results of tests are available.    Brandi was seen today for follow-up.    Diagnoses and all orders for this visit:    Gastroesophageal reflux disease without esophagitis  -     Ambulatory referral for Screening EGD  -     Comprehensive Metabolic Panel    Anemia, unspecified type  -     Comprehensive Metabolic Panel  -     CBC & Differential    Chronic midline low back pain without sciatica    Immunization due  -     Fluarix/Fluzone/Afluria Quad>6 Months        Return to Office    The patient was instructed to return for follow-up in 6 months. Next Visit: Follow-up.    The patient was instructed to return sooner if the condition changes, worsens, or does not resolve.

## 2020-03-05 NOTE — PATIENT INSTRUCTIONS
Advance Care Planning and Advance Directives     You make decisions on a daily basis - decisions about where you want to live, your career, your home, your life. Perhaps one of the most important decisions you face is your choice for future medical care. Take time to talk with your family and your healthcare team and start planning today.  Advance Care Planning is a process that can help you:  · Understand possible future healthcare decisions in light of your own experiences  · Reflect on those decision in light of your goals and values  · Discuss your decisions with those closest to you and the healthcare professionals that care for you  · Make a plan by creating a document that reflects your wishes    Surrogate Decision Maker  In the event of a medical emergency, which has left you unable to communicate or to make your own decisions, you would need someone to make decisions for you.  It is important to discuss your preferences for medical treatment with this person while you are in good health.     Qualities of a surrogate decision maker:  • Willing to take on this role and responsibility  • Knows what you want for future medical care  • Willing to follow your wishes even if they don't agree with them  • Able to make difficult medical decisions under stressful circumstances    Advance Directives  These are legal documents you can create that will guide your healthcare team and decision maker(s) when needed. These documents can be stored in the electronic medical record.    · Living Will - a legal document to guide your care if you have a terminal condition or a serious illness and are unable to communicate. States vary by statute in document names/types, but most forms may include one or more of the following:        -  Directions regarding life-prolonging treatments        -  Directions regarding artificially provided nutrition/hydration        -  Choosing a healthcare decision maker        -  Direction  regarding organ/tissue donation    · Durable Power of  for Healthcare - this document names an -in-fact to make medical decisions for you, but it may also allow this person to make personal and financial decisions for you. Please seek the advice of an  if you need this type of document.    **Advance Directives are not required and no one may discriminate against you if you do not sign one.    Medical Orders  Many states allow specific forms/orders signed by your physician to record your wishes for medical treatment in your current state of health. This form, signed in personal communication with your physician, addresses resuscitation and other medical interventions that you may or may not want.      For more information or to schedule a time with a [facility] Advance Care Planning Facilitator contact: [email or web address] or call [phone] and someone will contact you directly.

## 2020-03-12 DIAGNOSIS — M54.50 CHRONIC LOW BACK PAIN WITHOUT SCIATICA, UNSPECIFIED BACK PAIN LATERALITY: ICD-10-CM

## 2020-03-12 DIAGNOSIS — G89.29 CHRONIC LOW BACK PAIN WITHOUT SCIATICA, UNSPECIFIED BACK PAIN LATERALITY: ICD-10-CM

## 2020-03-14 RX ORDER — TRAMADOL HYDROCHLORIDE 50 MG/1
TABLET ORAL
Qty: 60 TABLET | Refills: 1 | Status: SHIPPED | OUTPATIENT
Start: 2020-03-14 | End: 2020-04-06 | Stop reason: SDUPTHER

## 2020-03-19 ENCOUNTER — TELEPHONE (OUTPATIENT)
Dept: INTERNAL MEDICINE | Facility: CLINIC | Age: 75
End: 2020-03-19

## 2020-03-19 RX ORDER — AZELASTINE 1 MG/ML
2 SPRAY, METERED NASAL 2 TIMES DAILY
Qty: 30 ML | Refills: 12 | Status: SHIPPED | OUTPATIENT
Start: 2020-03-19 | End: 2020-09-10

## 2020-03-19 NOTE — TELEPHONE ENCOUNTER
I have sent in the nasal spray but a z-shabnam does not help allergies.  If she feels that this is a sinus infection and needs antibiotics, she will need to come in for an evaluation.  Abram Myers MD  19:45  03/19/20

## 2020-03-19 NOTE — TELEPHONE ENCOUNTER
PT CALLED AND STATED THAT SHE HAS SEASONAL ALLERGIES AND HAS THE FOLLOWING SYMPTOMS    POST NASAL DRIP  THROAT IS SORE FROM DRIP    SHE WOULD LIKE A REFILL ON THE FOLLOWING   azelastine (ASTELIN) 0.1 % nasal spray     And possibly a zpak    PT CALL BACK [phone]    ALEKSEY GARCES -CONFIRMED    PT TRIED A VIRTUAL BUT INTERNET WAS DOWN AND SHE DOES NOT WANT TO COME INTO THE OFFICE

## 2020-04-04 RX ORDER — MONTELUKAST SODIUM 10 MG/1
TABLET ORAL
Qty: 90 TABLET | Refills: 0 | Status: SHIPPED | OUTPATIENT
Start: 2020-04-04 | End: 2020-08-11

## 2020-04-04 RX ORDER — PANTOPRAZOLE SODIUM 40 MG/1
TABLET, DELAYED RELEASE ORAL
Qty: 90 TABLET | Refills: 0 | Status: SHIPPED | OUTPATIENT
Start: 2020-04-04 | End: 2020-08-07 | Stop reason: SDUPTHER

## 2020-04-06 ENCOUNTER — TELEPHONE (OUTPATIENT)
Dept: INTERNAL MEDICINE | Facility: CLINIC | Age: 75
End: 2020-04-06

## 2020-04-06 DIAGNOSIS — M54.50 CHRONIC LOW BACK PAIN WITHOUT SCIATICA, UNSPECIFIED BACK PAIN LATERALITY: ICD-10-CM

## 2020-04-06 DIAGNOSIS — G89.29 CHRONIC LOW BACK PAIN WITHOUT SCIATICA, UNSPECIFIED BACK PAIN LATERALITY: ICD-10-CM

## 2020-04-06 RX ORDER — TRAMADOL HYDROCHLORIDE 50 MG/1
50 TABLET ORAL EVERY 6 HOURS PRN
Qty: 120 TABLET | Refills: 2 | Status: SHIPPED | OUTPATIENT
Start: 2020-04-06 | End: 2020-08-22 | Stop reason: SDUPTHER

## 2020-04-06 NOTE — TELEPHONE ENCOUNTER
Regarding: Prescription Question  Contact: [phone]  ----- Message from Natalie Peace MA sent at 4/6/2020  2:57 PM EDT -----       ----- Message from Brandi Duncan to Abram Myers MD sent at 4/6/2020  2:26 PM -----   The last time I was in and talked to you, we talked about my prescription for Tramadol. My previous prescription was written for 2 a day and we talked about my taking one every 6 hours instead of 1/2 every 6 hours. You were okay with that but I did not need a new prescription at that time because I still had some left. Could I now get a prescription for the inceased dosage? If so, do I need to come in for a paper prescription or could you send it directly to the [facility] in [facility] so I do not have to come in? I am trying not to go out in oublic unless absolutely necessary.

## 2020-04-15 ENCOUNTER — TELEPHONE (OUTPATIENT)
Dept: INTERNAL MEDICINE | Facility: CLINIC | Age: 75
End: 2020-04-15

## 2020-04-15 DIAGNOSIS — J30.9 ALLERGIC RHINITIS, UNSPECIFIED SEASONALITY, UNSPECIFIED TRIGGER: Primary | ICD-10-CM

## 2020-04-15 RX ORDER — FLUTICASONE PROPIONATE 50 MCG
2 SPRAY, SUSPENSION (ML) NASAL DAILY
Qty: 16 G | Refills: 3 | Status: SHIPPED | OUTPATIENT
Start: 2020-04-15

## 2020-04-15 NOTE — TELEPHONE ENCOUNTER
Patient called and stated that she needs a prescription for the following prescription(s):    claritin-d  flonase    Pt states because she and her  are elderly, she does not want to go into a store.  Pt is asking for a prescription so that she can pick it up through the drive thru.    Pharmacy: [facility] in [place]-confirmed  PH: [phone]  FX: [phone]    Patient callback: [phone]    Please advise.

## 2020-04-17 ENCOUNTER — TELEPHONE (OUTPATIENT)
Dept: INTERNAL MEDICINE | Facility: CLINIC | Age: 75
End: 2020-04-17

## 2020-04-17 NOTE — TELEPHONE ENCOUNTER
----- Message from Samara Duran CMA sent at 4/17/2020  7:49 AM EDT -----  Regarding: FW: Prescription Question  Contact: [phone]      ----- Message -----  From: Brandi MERCY Duncan  Sent: 4/16/2020   5:14 PM EDT  To: Stephan Vazquez [facility] Clinical Pool  Subject: Prescription Question                            My , Basil Duncan 2-8-1943,  and I both take generic Claitin D 12 hour during alergy season. We typically get the 30 pack.   We use the [facility] at [facility] and they will not allow us to get it at the  window but we must go inside and sign the pad. We are both seniors and are not going inside any stores at this time. I called the pharmacy and they said if we had a prescription for it, that was designated to be filled with or without insurance, that we would be able to pick it up at the drive through window. Would you please send in prescriptions for us so we do not have to go inside the store to get this medication?

## 2020-07-13 ENCOUNTER — HOSPITAL ENCOUNTER (OUTPATIENT)
Dept: GENERAL RADIOLOGY | Facility: HOSPITAL | Age: 75
Discharge: HOME | End: 2020-07-13
Admitting: NURSE PRACTITIONER

## 2020-07-13 ENCOUNTER — OFFICE VISIT (OUTPATIENT)
Dept: INTERNAL MEDICINE | Facility: CLINIC | Age: 75
End: 2020-07-13

## 2020-07-13 VITALS
TEMPERATURE: 97.5 F | OXYGEN SATURATION: 96 % | DIASTOLIC BLOOD PRESSURE: 70 MMHG | WEIGHT: 126.25 LBS | HEART RATE: 71 BPM | SYSTOLIC BLOOD PRESSURE: 110 MMHG | BODY MASS INDEX: 23.47 KG/M2 | RESPIRATION RATE: 18 BRPM

## 2020-07-13 DIAGNOSIS — R10.11 RUQ PAIN: Primary | ICD-10-CM

## 2020-07-13 DIAGNOSIS — K59.00 CONSTIPATION, UNSPECIFIED CONSTIPATION TYPE: ICD-10-CM

## 2020-07-13 DIAGNOSIS — R07.81 RIB PAIN ON RIGHT SIDE: ICD-10-CM

## 2020-07-13 DIAGNOSIS — K21.9 GASTROESOPHAGEAL REFLUX DISEASE, ESOPHAGITIS PRESENCE NOT SPECIFIED: ICD-10-CM

## 2020-07-13 DIAGNOSIS — R82.998 LEUKOCYTES IN URINE: ICD-10-CM

## 2020-07-13 LAB
BILIRUB BLD-MCNC: NEGATIVE MG/DL
CLARITY, POC: ABNORMAL
COLOR UR: YELLOW
EXPIRATION DATE: ABNORMAL
GLUCOSE UR STRIP-MCNC: NEGATIVE MG/DL
KETONES UR QL: NEGATIVE
LEUKOCYTE EST, POC: ABNORMAL
Lab: ABNORMAL
NITRITE UR-MCNC: NEGATIVE MG/ML
PH UR: 5 [PH] (ref 5–8)
PROT UR STRIP-MCNC: NEGATIVE MG/DL
RBC # UR STRIP: NEGATIVE /UL
SP GR UR: 1.01 (ref 1–1.03)
UROBILINOGEN UR QL: NORMAL

## 2020-07-13 PROCEDURE — 71100 X-RAY EXAM RIBS UNI 2 VIEWS: CPT

## 2020-07-13 PROCEDURE — 87086 URINE CULTURE/COLONY COUNT: CPT | Performed by: NURSE PRACTITIONER

## 2020-07-13 PROCEDURE — 81003 URINALYSIS AUTO W/O SCOPE: CPT | Performed by: NURSE PRACTITIONER

## 2020-07-13 PROCEDURE — 99213 OFFICE O/P EST LOW 20 MIN: CPT | Performed by: NURSE PRACTITIONER

## 2020-07-13 RX ORDER — SUCRALFATE 1 G/1
1 TABLET ORAL 4 TIMES DAILY
Qty: 40 TABLET | Refills: 0 | Status: SHIPPED | OUTPATIENT
Start: 2020-07-13 | End: 2021-03-02

## 2020-07-13 NOTE — PROGRESS NOTES
Chief Complaint   Patient presents with   • Back Pain     x1week on right side on the rib at the end,has not been able to sleep   • Heartburn     alot of elvia        Subjective     History of Present Illness   Patient is here today with complaints of right mid back pain radiating to the right rib area.  She has had a difficulty with sleeping.  She has been having heartburn and belching.  She is taking pantoprazole and Pepcid as well as Gas-X as needed.  She is taken Carafate in the past but not recently.  She is taking Benefiber 2 teaspoons and MiraLAX daily along with Amitiza twice daily.  She has a decreased appetite.  She reports using fleets enema as needed for constipation.  She reports her gastroenterologist Dr. Olivares sent her to [facility] for an evacuation test.  She knows she is due to go back there.  She reports she has had pelvic floor therapy which is been helpful but she has not been there since the pandemic and she feels her symptoms have worsened since that time.  She describes a large amount of flatus.    Patient has a history of ovarian cancer, chronic constipation, GERD, colon polyps, urinary retention.          The following portions of the patient's history were reviewed and updated as appropriate: allergies, current medications, past family history, past medical history, past social history, past surgical history and problem list.      Current Outpatient Medications:   •  acetaminophen (TYLENOL) 650 MG 8 hr tablet, Take 1,300 mg by mouth Every 8 (Eight) Hours As Needed for Mild Pain ., Disp: , Rfl:   •  AMITIZA 24 MCG capsule, Take 1 capsule by mouth 2 (Two) Times a Day., Disp: , Rfl:   •  azelastine (ASTELIN) 0.1 % nasal spray, 2 sprays into the nostril(s) as directed by provider 2 (Two) Times a Day. Use in each nostril as directed, Disp: 30 mL, Rfl: 12  •  B Complex Vitamins (VITAMIN B COMPLEX PO), Take 1 tablet by mouth Daily., Disp: , Rfl:   •  Calcium-Magnesium-Vitamin D (CALCIUM  MAGNESIUM PO), Take  by mouth., Disp: , Rfl:   •  custom compounded cream, Apply  topically. CUSTOM COMPOUNDED RECTAL SUPPOSITORIES BELLADONNA/MORPHINE SULF 16.2/5MG  INSERT 1 SUPPOSITORY TWICE DAILY AS NEEDED, Disp: , Rfl:   •  custom compounded cream, Apply  topically. CUSTOM COMPOUNDED RECTAL SUPPOSITORY HYDROCORTISONE/LIDOCAINE 2.5% / 2% INSERT 1 SUPPOSITORY TWICE DAILY AS NEEDED, Disp: , Rfl:   •  DHA-EPA-Vitamin E (OMEGA-3 COMPLEX PO), Take 1 capsule by mouth Daily., Disp: , Rfl:   •  diazePAM (DIASTAT) 2.5 MG gel, Insert 5 mg into the rectum At Night As Needed., Disp: , Rfl:   •  diclofenac (VOLTAREN) 1 % gel gel, Apply four grams topically to affected areas four times a day as needed for pain., Disp: 100 g, Rfl: 1  •  Dietary Management Product (VASCULERA PO), Take 1 tablet by mouth Daily., Disp: , Rfl:   •  Diphenhydramine-APAP, sleep, (TYLENOL PM EXTRA STRENGTH PO), Take 2 tablets by mouth At Night As Needed., Disp: , Rfl:   •  doxazosin (CARDURA) 2 MG tablet, TAKE ONE TABLET BY MOUTH EVERY EVENING, Disp: 90 tablet, Rfl: 1  •  famotidine (PEPCID) 20 MG tablet, Take 20 mg by mouth 2 (Two) Times a Day As Needed., Disp: , Rfl:   •  fluticasone (FLONASE) 50 MCG/ACT nasal spray, 2 sprays into the nostril(s) as directed by provider Daily., Disp: 16 g, Rfl: 3  •  gabapentin (NEURONTIN) 100 MG capsule, TAKE 3 CAPSULES AT NOON AND 5 CAPSULES AT HS, Disp: , Rfl:   •  Lidocaine, Anorectal, (RECTICARE) 5 % cream cream, Apply  topically 4 (Four) Times a Day As Needed., Disp: , Rfl:   •  loratadine-pseudoephedrine (Claritin-D 12 Hour) 5-120 MG per 12 hr tablet, Take 1 tablet by mouth 2 (Two) Times a Day As Needed for Allergies., Disp: 60 tablet, Rfl: 2  •  MAGNESIUM OXIDE PO, Take  by mouth Daily., Disp: , Rfl:   •  montelukast (SINGULAIR) 10 MG tablet, TAKE ONE TABLET BY MOUTH DAILY, Disp: 90 tablet, Rfl: 0  •  pantoprazole (PROTONIX) 40 MG EC tablet, TAKE ONE TABLET BY MOUTH DAILY, Disp: 90 tablet, Rfl: 0  •   polyethylene glycol (MIRALAX) powder, Take 17 g by mouth Daily As Needed (constipation)., Disp: , Rfl:   •  Probiotic Product (PROBIOTIC DAILY PO), Take 1 tablet by mouth Daily., Disp: , Rfl:   •  raloxifene (EVISTA) 60 MG tablet, TAKE 1 TABLET DAILY, Disp: 90 tablet, Rfl: 4  •  Simethicone (GAS-X PO), Take  by mouth., Disp: , Rfl:   •  traMADol (ULTRAM) 50 MG tablet, Take 1 tablet by mouth Every 6 (Six) Hours As Needed for Moderate Pain ., Disp: 120 tablet, Rfl: 2  •  VITAMIN D, CHOLECALCIFEROL, PO, Take  by mouth., Disp: , Rfl:   •  Wheat Dextrin (BENEFIBER DRINK MIX PO), Take  by mouth., Disp: , Rfl:   •  lactulose (CHRONULAC) 10 GM/15ML solution, Take 30 mL by mouth 2 (Two) Times a Day As Needed (Constipation)., Disp: 1000 mL, Rfl: 1  •  sucralfate (Carafate) 1 g tablet, Take 1 tablet by mouth 4 (Four) Times a Day., Disp: 40 tablet, Rfl: 0    Vitals:    07/13/20 1452   BP: 110/70   Pulse: 71   Resp: 18   Temp: 97.5 °F (36.4 °C)   SpO2: 96%       Body mass index is 23.47 kg/m².        Review of Systems   Constitutional: Negative for activity change, appetite change, chills, fatigue and fever.   HENT: Negative for congestion, postnasal drip and sore throat.    Eyes: Negative for visual disturbance.   Respiratory: Negative for cough and shortness of breath.    Cardiovascular: Negative for chest pain, palpitations and leg swelling.   Gastrointestinal: Positive for GERD and indigestion. Negative for abdominal pain, constipation, diarrhea and nausea.        Right upper quadrant abdominal pain   Musculoskeletal: Negative for arthralgias and myalgias.        Right flank pain   Skin: Negative for rash.   Allergic/Immunologic: Negative for environmental allergies.   Neurological: Negative for dizziness.   Psychiatric/Behavioral: Negative for sleep disturbance.   All other systems reviewed and are negative.      Objective   Physical Exam   Constitutional: She is oriented to person, place, and time. She appears  well-developed and well-nourished.   HENT:   Head: Normocephalic and atraumatic.   Cardiovascular: Normal rate and regular rhythm.   Pulmonary/Chest: Effort normal and breath sounds normal.   Abdominal: Soft. Bowel sounds are normal. She exhibits no distension and no mass. There is no tenderness. There is no rebound and no guarding. No hernia.   Neurological: She is alert and oriented to person, place, and time.   Skin: Skin is warm and dry. Capillary refill takes 2 to 3 seconds. No rash noted.   Psychiatric: She has a normal mood and affect. Her behavior is normal.   Nursing note and vitals reviewed.          Results for orders placed or performed in visit on 07/13/20   Urine Culture - Urine, Urine, Clean Catch   Result Value Ref Range    Urine Culture 25,000 CFU/mL Mixed Jolanta Isolated    POC Urinalysis Dipstick, Automated   Result Value Ref Range    Color Yellow Yellow, Straw, Dark Yellow, Jeny    Clarity, UA Hazy (A) Clear    Specific Gravity  1.010 1.005 - 1.030    pH, Urine 5.0 5.0 - 8.0    Leukocytes 25 Raina/ul (A) Negative    Nitrite, UA Negative Negative    Protein, POC Negative Negative mg/dL    Glucose, UA Negative Negative, 1000 mg/dL (3+) mg/dL    Ketones, UA Negative Negative    Urobilinogen, UA Normal Normal    Bilirubin Negative Negative    Blood, UA Negative Negative    Lot Number [id number]     Expiration Date 11-30-20         Assessment/Plan   Brandi was seen today for back pain and heartburn.    Diagnoses and all orders for this visit:    RUQ pain  -     XR Ribs 2 View Right; Future  -     POC Urinalysis Dipstick, Automated    Gastroesophageal reflux disease, esophagitis presence not specified  -     sucralfate (Carafate) 1 g tablet; Take 1 tablet by mouth 4 (Four) Times a Day.    Leukocytes in urine  -     Urine Culture - Urine, Urine, Clean Catch    Constipation, unspecified constipation type    Other orders  -     lactulose (CHRONULAC) 10 GM/15ML solution; Take 30 mL by mouth 2 (Two) Times a Day  As Needed (Constipation).      Constipation with exacerbation of GERD most likely however given she has still has her gallbladder would consider further evaluation.  I have asked the patient to increase the MiraLAX to twice daily, restart pelvic floor therapy.  Will do a trial of  Carafate to see if this is helpful.  Could consider lactulose.  If her symptoms persist I would recommend further evaluation for other etiologies with imaging of her abdomen.      Return if symptoms worsen or fail to improve.  RTC/call  If symptoms worsen  Meds MOA and SE's reviewed and pt v/u

## 2020-07-14 LAB — BACTERIA SPEC AEROBE CULT: NORMAL

## 2020-07-23 ENCOUNTER — TELEPHONE (OUTPATIENT)
Dept: INTERNAL MEDICINE | Facility: CLINIC | Age: 75
End: 2020-07-23

## 2020-07-23 DIAGNOSIS — R10.11 RUQ PAIN: Primary | ICD-10-CM

## 2020-07-23 NOTE — TELEPHONE ENCOUNTER
I would like to set patient up for an ultrasound of her gallbladder and abdominal x-ray.  I placed the orders.  If her symptoms are worsening she should be seen in the ER over the weekend.   NU (obstructive sleep apnea) NU (obstructive sleep apnea) NU (obstructive sleep apnea) NU (obstructive sleep apnea) NU (obstructive sleep apnea)

## 2020-07-23 NOTE — TELEPHONE ENCOUNTER
Patient is calling because she was seen 10 days ago for her side and back pain. Patient stating that she is still having this pain and not sure what next steps she should be taking. Patient does have a scheduled an appointment on Monday the 27th but wants to know what to do in the mean time. Please advise and call back    [phone]

## 2020-07-24 NOTE — TELEPHONE ENCOUNTER
LVM at [phone] for the patient to return our call, office number was provided      HUB can relay message

## 2020-07-25 RX ORDER — LACTULOSE 10 G/15ML
20 SOLUTION ORAL 2 TIMES DAILY PRN
Qty: 1000 ML | Refills: 1 | Status: SHIPPED | OUTPATIENT
Start: 2020-07-25 | End: 2020-09-10

## 2020-07-27 ENCOUNTER — OFFICE VISIT (OUTPATIENT)
Dept: INTERNAL MEDICINE | Facility: CLINIC | Age: 75
End: 2020-07-27

## 2020-07-27 VITALS
WEIGHT: 129.25 LBS | SYSTOLIC BLOOD PRESSURE: 110 MMHG | TEMPERATURE: 97.5 F | RESPIRATION RATE: 18 BRPM | HEART RATE: 78 BPM | DIASTOLIC BLOOD PRESSURE: 68 MMHG | BODY MASS INDEX: 24.03 KG/M2 | OXYGEN SATURATION: 99 %

## 2020-07-27 DIAGNOSIS — K21.9 GASTROESOPHAGEAL REFLUX DISEASE, ESOPHAGITIS PRESENCE NOT SPECIFIED: Primary | ICD-10-CM

## 2020-07-27 DIAGNOSIS — M54.50 CHRONIC LOW BACK PAIN WITHOUT SCIATICA, UNSPECIFIED BACK PAIN LATERALITY: ICD-10-CM

## 2020-07-27 DIAGNOSIS — G89.29 CHRONIC LOW BACK PAIN WITHOUT SCIATICA, UNSPECIFIED BACK PAIN LATERALITY: ICD-10-CM

## 2020-07-27 PROCEDURE — 99213 OFFICE O/P EST LOW 20 MIN: CPT | Performed by: NURSE PRACTITIONER

## 2020-07-28 ENCOUNTER — APPOINTMENT (OUTPATIENT)
Dept: PREADMISSION TESTING | Facility: HOSPITAL | Age: 75
End: 2020-07-28

## 2020-07-28 PROCEDURE — U0002 COVID-19 LAB TEST NON-CDC: HCPCS

## 2020-07-28 PROCEDURE — U0004 COV-19 TEST NON-CDC HGH THRU: HCPCS

## 2020-07-28 PROCEDURE — C9803 HOPD COVID-19 SPEC COLLECT: HCPCS

## 2020-07-29 LAB
REF LAB TEST METHOD: NORMAL
SARS-COV-2 RNA RESP QL NAA+PROBE: NOT DETECTED

## 2020-07-31 ENCOUNTER — OUTSIDE FACILITY SERVICE (OUTPATIENT)
Dept: GASTROENTEROLOGY | Facility: CLINIC | Age: 75
End: 2020-07-31

## 2020-07-31 ENCOUNTER — LAB REQUISITION (OUTPATIENT)
Dept: LAB | Facility: HOSPITAL | Age: 75
End: 2020-07-31

## 2020-07-31 DIAGNOSIS — K21.9 GASTRO-ESOPHAGEAL REFLUX DISEASE WITHOUT ESOPHAGITIS: ICD-10-CM

## 2020-07-31 PROCEDURE — 88305 TISSUE EXAM BY PATHOLOGIST: CPT | Performed by: INTERNAL MEDICINE

## 2020-07-31 PROCEDURE — 43239 EGD BIOPSY SINGLE/MULTIPLE: CPT | Performed by: INTERNAL MEDICINE

## 2020-08-04 ENCOUNTER — HOSPITAL ENCOUNTER (OUTPATIENT)
Dept: ULTRASOUND IMAGING | Facility: HOSPITAL | Age: 75
Discharge: HOME | End: 2020-08-04
Admitting: NURSE PRACTITIONER

## 2020-08-04 DIAGNOSIS — R10.11 RUQ PAIN: ICD-10-CM

## 2020-08-04 PROCEDURE — 76705 ECHO EXAM OF ABDOMEN: CPT

## 2020-08-06 LAB
CYTO UR: NORMAL
LAB AP CASE REPORT: NORMAL
LAB AP CLINICAL INFORMATION: NORMAL
PATH REPORT.FINAL DX SPEC: NORMAL
PATH REPORT.GROSS SPEC: NORMAL

## 2020-08-07 RX ORDER — PANTOPRAZOLE SODIUM 40 MG/1
40 TABLET, DELAYED RELEASE ORAL DAILY
Qty: 90 TABLET | Refills: 0 | Status: SHIPPED | OUTPATIENT
Start: 2020-08-07 | End: 2020-11-03

## 2020-08-10 RX ORDER — DOXAZOSIN 2 MG/1
TABLET ORAL
Qty: 90 TABLET | Refills: 0 | Status: SHIPPED | OUTPATIENT
Start: 2020-08-10 | End: 2020-11-07

## 2020-08-11 RX ORDER — MONTELUKAST SODIUM 10 MG/1
TABLET ORAL
Qty: 90 TABLET | Refills: 1 | Status: SHIPPED | OUTPATIENT
Start: 2020-08-11 | End: 2021-02-09 | Stop reason: SDUPTHER

## 2020-08-17 ENCOUNTER — TRANSCRIBE ORDERS (OUTPATIENT)
Dept: ADMINISTRATIVE | Facility: HOSPITAL | Age: 75
End: 2020-08-17

## 2020-08-17 DIAGNOSIS — Z12.31 VISIT FOR SCREENING MAMMOGRAM: Primary | ICD-10-CM

## 2020-08-18 ENCOUNTER — OFFICE VISIT (OUTPATIENT)
Dept: INTERNAL MEDICINE | Facility: CLINIC | Age: 75
End: 2020-08-18

## 2020-08-18 VITALS
HEART RATE: 88 BPM | RESPIRATION RATE: 18 BRPM | TEMPERATURE: 98.4 F | WEIGHT: 127.5 LBS | BODY MASS INDEX: 23.46 KG/M2 | HEIGHT: 62 IN | SYSTOLIC BLOOD PRESSURE: 100 MMHG | DIASTOLIC BLOOD PRESSURE: 58 MMHG | OXYGEN SATURATION: 96 %

## 2020-08-18 DIAGNOSIS — Z00.00 MEDICARE ANNUAL WELLNESS VISIT, SUBSEQUENT: Primary | ICD-10-CM

## 2020-08-18 DIAGNOSIS — Z78.0 POST-MENOPAUSE: ICD-10-CM

## 2020-08-18 PROCEDURE — G0439 PPPS, SUBSEQ VISIT: HCPCS | Performed by: NURSE PRACTITIONER

## 2020-08-18 NOTE — PATIENT INSTRUCTIONS
Medicare Wellness  Personal Prevention Plan of Service     Date of Office Visit:  2020  Encounter Provider:  ALIN Castrejon  Place of Service:  [facility]  Patient Name: Brandi Duncan YOB: 1945    As part of the Medicare Wellness portion of your visit today, we are providing you with this personalized preventive plan of services (PPPS). This plan is based upon recommendations of the [place] Preventive Services Task Force (USPSTF) and the Advisory Committee on Immunization Practices (ACIP).    This lists the preventive care services that should be considered, and provides dates of when you are due. Items listed as completed are up-to-date and do not require any further intervention.    Health Maintenance   Topic Date Due   • TDAP/TD VACCINES (1 - Tdap) 1956   • ZOSTER VACCINE (1 of 2) 1995   • DXA SCAN  2020   • MEDICARE ANNUAL WELLNESS  2020   • INFLUENZA VACCINE  2020   • MAMMOGRAM  2021   • COLONOSCOPY  10/26/2023   • HEPATITIS C SCREENING  Completed   • Pneumococcal Vaccine Once at 65 Years Old  Completed       No orders of the defined types were placed in this encounter.      Return in about 1 year (around 2021), or if symptoms worsen or fail to improve.

## 2020-08-18 NOTE — PROGRESS NOTES
The ABCs of the Annual Wellness Visit  Subsequent Medicare Wellness Visit    Chief Complaint   Patient presents with   • Medicare Wellness-subsequent       Subjective   History of Present Illness:  Brandi Duncan is a 74 y.o. female who presents for a Subsequent Medicare Wellness Visit.    HEALTH RISK ASSESSMENT    Recent Hospitalizations:  No hospitalization(s) within the last year.    Current Medical Providers:  Patient Care Team:  Abram Myers MD as PCP - General (Internal Medicine)  Abram Myers MD as PCP - Claims Attributed  Yarelis Eddy MD as Consulting Physician (Colon and Rectal Surgery)  Yarelis Eddy MD as Consulting Physician (Colon and Rectal Surgery)  Vamsi Fleming MD as Consulting Physician (Neurology)  Lei Fontaine MD as Consulting Physician (Hand Surgery)  Brandi Aly MD as Consulting Physician (Dermatology)  Derick Cho (Urology)  Bautista Crystal MD as Consulting Physician (Otolaryngology)  Davy Singh MD as Consulting Physician (Cardiology)  Keo Tafoya MD as Surgeon (Orthopedic Surgery)    Smoking Status:  Social History     Tobacco Use   Smoking Status Former Smoker   • Packs/day: 0.00   • Years: 8.00   • Pack years: 0.00   • Types: Cigars   • Start date:    • Last attempt to quit:    • Years since quittin.6   Smokeless Tobacco Never Used   Tobacco Comment    sporatic cigar       Alcohol Consumption:  Social History     Substance and Sexual Activity   Alcohol Use Yes   • Alcohol/week: 7.0 standard drinks   • Types: 7 Glasses of wine per week   • Frequency: 4 or more times a week   • Drinks per session: 1 or 2       Depression Screen:   PHQ-2/PHQ-9 Depression Screening 2020   Little interest or pleasure in doing things 0   Feeling down, depressed, or hopeless 0   Total Score 0       Fall Risk Screen:  NARGISADI Fall Risk Assessment was completed, and patient is at MODERATE risk for falls. Assessment completed  on:8/18/2020    Health Habits and Functional and Cognitive Screening:  Functional & Cognitive Status 8/18/2020   Do you have difficulty preparing food and eating? No   Do you have difficulty bathing yourself, getting dressed or grooming yourself? No   Do you have difficulty using the toilet? No   Do you have difficulty moving around from place to place? No   Do you have trouble with steps or getting out of a bed or a chair? No   Current Diet Well Balanced Diet   Dental Exam Up to date   Eye Exam Up to date   Exercise (times per week) 0 times per week   Current Exercise Activities Include None   Do you need help using the phone?  No   Are you deaf or do you have serious difficulty hearing?  No   Do you need help with transportation? No   Do you need help shopping? No   Do you need help preparing meals?  No   Do you need help with housework?  No   Do you need help with laundry? No   Do you need help taking your medications? No   Do you need help managing money? No   Do you ever drive or ride in a car without wearing a seat belt? No   Have you felt unusual stress, anger or loneliness in the last month? No   Who do you live with? Spouse   If you need help, do you have trouble finding someone available to you? No   Have you been bothered in the last four weeks by sexual problems? No   Do you have difficulty concentrating, remembering or making decisions? No         Does the patient have evidence of cognitive impairment? No    Asprin use counseling:Does not need ASA (and currently is not on it)    Age-appropriate Screening Schedule:  Refer to the list below for future screening recommendations based on patient's age, sex and/or medical conditions. Orders for these recommended tests are listed in the plan section. The patient has been provided with a written plan.    Health Maintenance   Topic Date Due   • ZOSTER VACCINE (2 of 2) 03/26/2020   • DXA SCAN  07/09/2020   • INFLUENZA VACCINE  08/01/2020   • TDAP/TD VACCINES (1  - Tdap) 08/23/2020 (Originally 12/11/1956)   • MAMMOGRAM  09/25/2021   • COLONOSCOPY  10/26/2023          The following portions of the patient's history were reviewed and updated as appropriate: allergies, current medications, past family history, past medical history, past social history, past surgical history and problem list.    Outpatient Medications Prior to Visit   Medication Sig Dispense Refill   • acetaminophen (TYLENOL) 650 MG 8 hr tablet Take 1,300 mg by mouth Every 8 (Eight) Hours As Needed for Mild Pain .     • AMITIZA 24 MCG capsule Take 1 capsule by mouth 2 (Two) Times a Day.     • azelastine (ASTELIN) 0.1 % nasal spray 2 sprays into the nostril(s) as directed by provider 2 (Two) Times a Day. Use in each nostril as directed 30 mL 12   • B Complex Vitamins (VITAMIN B COMPLEX PO) Take 1 tablet by mouth Daily.     • Calcium-Magnesium-Vitamin D (CALCIUM MAGNESIUM PO) Take  by mouth.     • custom compounded cream Apply  topically. CUSTOM COMPOUNDED RECTAL SUPPOSITORIES  BELLADONNA/MORPHINE SULF 16.2/5MG   INSERT 1 SUPPOSITORY TWICE DAILY AS NEEDED     • custom compounded cream Apply  topically. CUSTOM COMPOUNDED RECTAL SUPPOSITORY  HYDROCORTISONE/LIDOCAINE 2.5% / 2%  INSERT 1 SUPPOSITORY TWICE DAILY AS NEEDED     • DHA-EPA-Vitamin E (OMEGA-3 COMPLEX PO) Take 1 capsule by mouth Daily.     • diazePAM (DIASTAT) 2.5 MG gel Insert 5 mg into the rectum At Night As Needed.     • diclofenac (VOLTAREN) 1 % gel gel Apply four grams topically to affected areas four times a day as needed for pain. 100 g 1   • Dietary Management Product (VASCULERA PO) Take 1 tablet by mouth Daily.     • Diphenhydramine-APAP, sleep, (TYLENOL PM EXTRA STRENGTH PO) Take 2 tablets by mouth At Night As Needed.     • doxazosin (CARDURA) 2 MG tablet TAKE ONE TABLET BY MOUTH EVERY EVENING 90 tablet 0   • famotidine (PEPCID) 20 MG tablet Take 20 mg by mouth 2 (Two) Times a Day As Needed.     • fluticasone (FLONASE) 50 MCG/ACT nasal spray 2  sprays into the nostril(s) as directed by provider Daily. 16 g 3   • gabapentin (NEURONTIN) 100 MG capsule TAKE 3 CAPSULES AT NOON AND 5 CAPSULES AT HS     • lactulose (CHRONULAC) 10 GM/15ML solution Take 30 mL by mouth 2 (Two) Times a Day As Needed (Constipation). 1000 mL 1   • Lidocaine, Anorectal, (RECTICARE) 5 % cream cream Apply  topically 4 (Four) Times a Day As Needed.     • loratadine-pseudoephedrine (Claritin-D 12 Hour) 5-120 MG per 12 hr tablet Take 1 tablet by mouth 2 (Two) Times a Day As Needed for Allergies. 60 tablet 2   • MAGNESIUM OXIDE PO Take  by mouth Daily.     • montelukast (SINGULAIR) 10 MG tablet TAKE ONE TABLET BY MOUTH DAILY 90 tablet 1   • pantoprazole (PROTONIX) 40 MG EC tablet Take 1 tablet by mouth Daily. 90 tablet 0   • polyethylene glycol (MIRALAX) powder Take 17 g by mouth Daily As Needed (constipation).     • Probiotic Product (PROBIOTIC DAILY PO) Take 1 tablet by mouth Daily.     • raloxifene (EVISTA) 60 MG tablet TAKE 1 TABLET DAILY 90 tablet 4   • Simethicone (GAS-X PO) Take  by mouth.     • sucralfate (Carafate) 1 g tablet Take 1 tablet by mouth 4 (Four) Times a Day. 40 tablet 0   • VITAMIN D, CHOLECALCIFEROL, PO Take  by mouth.     • Wheat Dextrin (BENEFIBER DRINK MIX PO) Take  by mouth.     • traMADol (ULTRAM) 50 MG tablet Take 1 tablet by mouth Every 6 (Six) Hours As Needed for Moderate Pain . 120 tablet 2     No facility-administered medications prior to visit.        Patient Active Problem List   Diagnosis   • Rectal pain   • S/P hemorrhoidectomy 11/1/16   • Urinary retention   • Acute urinary retention   • Chronic low back pain       Advanced Care Planning:  ACP discussion was held with the patient during this visit. Patient has an advance directive in EMR which is still valid.     Review of Systems    Compared to one year ago, the patient feels her physical health is the same.  Compared to one year ago, the patient feels her mental health is the same.    Reviewed chart for  "potential of high risk medication in the elderly: yes  Reviewed chart for potential of harmful drug interactions in the elderly:yes    Objective         Vitals:    08/18/20 1458   BP: 100/58   BP Location: Right arm   Patient Position: Sitting   Cuff Size: Adult   Pulse: 88   Resp: 18   Temp: 98.4 °F (36.9 °C)   TempSrc: Temporal   SpO2: 96%   Weight: 57.8 kg (127 lb 8 oz)   Height: 157.5 cm (62\")   PainSc: 0-No pain   Finger Rub Hearing{Test (right ear):passed  Finger Rub Hearing{Test (left ear):passed        Body mass index is 23.32 kg/m².  Discussed the patient's BMI with her. The BMI is in the acceptable range.    Physical Exam          Assessment/Plan   Medicare Risks and Personalized Health Plan  CMS Preventative Services Quick Reference  Advance Directive Discussion  Fall Risk  Inactivity/Sedentary  Polypharmacy    The above risks/problems have been discussed with the patient.  Pertinent information has been shared with the patient in the After Visit Summary.  Follow up plans and orders are seen below in the Assessment/Plan Section.    Diagnoses and all orders for this visit:    1. Medicare annual wellness visit, subsequent (Primary)    2. Post-menopause  -     DEXA Bone Density Axial; Future      Follow Up:  Return in about 1 year (around 8/18/2021), or if symptoms worsen or fail to improve.     An After Visit Summary and PPPS were given to the patient.             "

## 2020-08-22 DIAGNOSIS — M54.50 CHRONIC LOW BACK PAIN WITHOUT SCIATICA, UNSPECIFIED BACK PAIN LATERALITY: ICD-10-CM

## 2020-08-22 DIAGNOSIS — G89.29 CHRONIC LOW BACK PAIN WITHOUT SCIATICA, UNSPECIFIED BACK PAIN LATERALITY: ICD-10-CM

## 2020-08-23 RX ORDER — TRAMADOL HYDROCHLORIDE 50 MG/1
50 TABLET ORAL EVERY 6 HOURS PRN
Qty: 120 TABLET | Refills: 2 | Status: SHIPPED | OUTPATIENT
Start: 2020-08-23 | End: 2020-11-28

## 2020-09-10 ENCOUNTER — OFFICE VISIT (OUTPATIENT)
Dept: INTERNAL MEDICINE | Facility: CLINIC | Age: 75
End: 2020-09-10

## 2020-09-10 VITALS
BODY MASS INDEX: 22.31 KG/M2 | HEART RATE: 72 BPM | SYSTOLIC BLOOD PRESSURE: 112 MMHG | TEMPERATURE: 97.1 F | RESPIRATION RATE: 18 BRPM | DIASTOLIC BLOOD PRESSURE: 62 MMHG | WEIGHT: 122 LBS

## 2020-09-10 DIAGNOSIS — H81.09 MENIERE'S DISEASE, UNSPECIFIED LATERALITY: ICD-10-CM

## 2020-09-10 DIAGNOSIS — K59.04 CHRONIC IDIOPATHIC CONSTIPATION: ICD-10-CM

## 2020-09-10 DIAGNOSIS — K21.9 GASTROESOPHAGEAL REFLUX DISEASE, ESOPHAGITIS PRESENCE NOT SPECIFIED: Primary | ICD-10-CM

## 2020-09-10 LAB
ALBUMIN SERPL-MCNC: 4.3 G/DL (ref 3.5–5.2)
ALBUMIN/GLOB SERPL: 1.7 G/DL
ALP SERPL-CCNC: 44 U/L (ref 39–117)
ALT SERPL W P-5'-P-CCNC: 10 U/L (ref 1–33)
ANION GAP SERPL CALCULATED.3IONS-SCNC: 10.7 MMOL/L (ref 5–15)
AST SERPL-CCNC: 17 U/L (ref 1–32)
BILIRUB SERPL-MCNC: 0.3 MG/DL (ref 0–1.2)
BUN SERPL-MCNC: 11 MG/DL (ref 8–23)
BUN/CREAT SERPL: 14.3 (ref 7–25)
CALCIUM SPEC-SCNC: 9 MG/DL (ref 8.6–10.5)
CHLORIDE SERPL-SCNC: 88 MMOL/L (ref 98–107)
CO2 SERPL-SCNC: 27.3 MMOL/L (ref 22–29)
CREAT SERPL-MCNC: 0.77 MG/DL (ref 0.57–1)
GFR SERPL CREATININE-BSD FRML MDRD: 73 ML/MIN/1.73
GLOBULIN UR ELPH-MCNC: 2.5 GM/DL
GLUCOSE SERPL-MCNC: 94 MG/DL (ref 65–99)
POTASSIUM SERPL-SCNC: 3.9 MMOL/L (ref 3.5–5.2)
PROT SERPL-MCNC: 6.8 G/DL (ref 6–8.5)
SODIUM SERPL-SCNC: 126 MMOL/L (ref 136–145)

## 2020-09-10 PROCEDURE — 99214 OFFICE O/P EST MOD 30 MIN: CPT | Performed by: INTERNAL MEDICINE

## 2020-09-10 PROCEDURE — 36415 COLL VENOUS BLD VENIPUNCTURE: CPT | Performed by: INTERNAL MEDICINE

## 2020-09-10 PROCEDURE — 80053 COMPREHEN METABOLIC PANEL: CPT | Performed by: INTERNAL MEDICINE

## 2020-09-10 RX ORDER — TRIAMTERENE AND HYDROCHLOROTHIAZIDE 37.5; 25 MG/1; MG/1
1 CAPSULE ORAL DAILY
COMMUNITY
Start: 2020-08-21

## 2020-09-10 RX ORDER — MECLIZINE HCL 12.5 MG/1
1 TABLET ORAL EVERY 6 HOURS PRN
COMMUNITY
Start: 2020-09-08

## 2020-09-10 NOTE — PROGRESS NOTES
Chief Complaint   Patient presents with   • Follow-up     6 month follow up chronic medical problems       History of Present Illness    The patient presents for a follow-up related to GERD. The patient is on pantoprazole and Pepcid for her gastroesophageal reflux. The medication is taken on a regular basis and gives complete relief of the symptoms. She reports no abdominal pain, belching, chest pain, diarrhea, dysphagia, early satiety, heartburn, hoarseness, nausea, odynophagia, rectal bleeding, vomiting or weight loss. The GERD has no known aggravating factors.    The patient presents for a follow-up related to constipation. The symptoms have been present for a chronic duration. Bowel movements occur approximately every day. The stools are soft. There is no blood in the bowel movement. Bowel Movements are not painful. There is no associated enuresis. The patient does not report abdominal pain. The patient denies neglecting the urge or putting off bowel movements. There is no history of excessive cows milk, calcium, limited fiber, iron supplements, narcotics, antacids or antidepressants. There are no new life stressors. Treatment includes Amitza. The patient has been compliant with treatment as outlined at the last visit. Her symptoms have improved.    The patient presents with a three week history of intermittent dizziness. She had a severe episode of menieres lasting about 3-6 hours. It has resolved. The sensation is described as a sensation of spinning. The patient denies tinnitus, headache, ear pain, double vision, syncope, paresthesias, hearing loss or tremor. The symptoms tend to come on gradually. The symptoms are not aggravated by a change in position, turning the head, standing up, or coughing. There has not been a recent change in eyeglasses. There has not been a recent upper respiratory infection.    Review of Systems    CONSTITUTIONAL- Denies Fever, Chills, Sweats, Fatigue, Weakness or Malaise.    HENT-  Denies Nasal Discharge, Sore Throat, Ear Drainage, Sinus Pain, Nasal Congestion or Tinnitus.    GASTROINTESTINAL- Reports: Constipation..    PULMONARY- Denies Wheezing, Dyspnea, Sputum Production, Cough, Hemoptysis or Pleuritic Chest Pain.    CARDIOVASCULAR- Denies Claudication, Edema, Palpitations or Irregular Heart Beat.    Medications      Current Outpatient Medications:   •  acetaminophen (TYLENOL) 650 MG 8 hr tablet, Take 1,300 mg by mouth Every 8 (Eight) Hours As Needed for Mild Pain ., Disp: , Rfl:   •  AMITIZA 24 MCG capsule, Take 1 capsule by mouth 2 (Two) Times a Day., Disp: , Rfl:   •  B Complex Vitamins (VITAMIN B COMPLEX PO), Take 1 tablet by mouth Daily., Disp: , Rfl:   •  Calcium-Magnesium-Vitamin D (CALCIUM MAGNESIUM PO), Take  by mouth., Disp: , Rfl:   •  custom compounded cream, Apply  topically. CUSTOM COMPOUNDED RECTAL SUPPOSITORIES BELLADONNA/MORPHINE SULF 16.2/5MG  INSERT 1 SUPPOSITORY TWICE DAILY AS NEEDED, Disp: , Rfl:   •  custom compounded cream, Apply  topically. CUSTOM COMPOUNDED RECTAL SUPPOSITORY HYDROCORTISONE/LIDOCAINE 2.5% / 2% INSERT 1 SUPPOSITORY TWICE DAILY AS NEEDED, Disp: , Rfl:   •  DHA-EPA-Vitamin E (OMEGA-3 COMPLEX PO), Take 1 capsule by mouth Daily., Disp: , Rfl:   •  diazePAM (DIASTAT) 2.5 MG gel, Insert 5 mg into the rectum At Night As Needed., Disp: , Rfl:   •  diclofenac (VOLTAREN) 1 % gel gel, Apply four grams topically to affected areas four times a day as needed for pain., Disp: 100 g, Rfl: 1  •  Dietary Management Product (VASCULERA PO), Take 1 tablet by mouth Daily., Disp: , Rfl:   •  Diphenhydramine-APAP, sleep, (TYLENOL PM EXTRA STRENGTH PO), Take 2 tablets by mouth At Night As Needed., Disp: , Rfl:   •  doxazosin (CARDURA) 2 MG tablet, TAKE ONE TABLET BY MOUTH EVERY EVENING, Disp: 90 tablet, Rfl: 0  •  famotidine (PEPCID) 20 MG tablet, Take 20 mg by mouth 2 (Two) Times a Day As Needed., Disp: , Rfl:   •  fluticasone (FLONASE) 50 MCG/ACT nasal spray, 2 sprays into  the nostril(s) as directed by provider Daily., Disp: 16 g, Rfl: 3  •  gabapentin (NEURONTIN) 100 MG capsule, TAKE 3 CAPSULES AT NOON AND 5 CAPSULES AT HS, Disp: , Rfl:   •  Lidocaine, Anorectal, (RECTICARE) 5 % cream cream, Apply  topically 4 (Four) Times a Day As Needed., Disp: , Rfl:   •  loratadine-pseudoephedrine (Claritin-D 12 Hour) 5-120 MG per 12 hr tablet, Take 1 tablet by mouth 2 (Two) Times a Day As Needed for Allergies., Disp: 60 tablet, Rfl: 2  •  MAGNESIUM OXIDE PO, Take  by mouth Daily., Disp: , Rfl:   •  montelukast (SINGULAIR) 10 MG tablet, TAKE ONE TABLET BY MOUTH DAILY, Disp: 90 tablet, Rfl: 1  •  pantoprazole (PROTONIX) 40 MG EC tablet, Take 1 tablet by mouth Daily., Disp: 90 tablet, Rfl: 0  •  polyethylene glycol (MIRALAX) powder, Take 17 g by mouth Daily As Needed (constipation)., Disp: , Rfl:   •  Probiotic Product (PROBIOTIC DAILY PO), Take 1 tablet by mouth Daily., Disp: , Rfl:   •  raloxifene (EVISTA) 60 MG tablet, TAKE 1 TABLET DAILY, Disp: 90 tablet, Rfl: 4  •  Simethicone (GAS-X PO), Take  by mouth., Disp: , Rfl:   •  sucralfate (Carafate) 1 g tablet, Take 1 tablet by mouth 4 (Four) Times a Day., Disp: 40 tablet, Rfl: 0  •  traMADol (ULTRAM) 50 MG tablet, Take 1 tablet by mouth Every 6 (Six) Hours As Needed for Moderate Pain ., Disp: 120 tablet, Rfl: 2  •  triamterene-hydrochlorothiazide (DYAZIDE) 37.5-25 MG per capsule, Take 1 capsule by mouth Daily., Disp: , Rfl:   •  VITAMIN D, CHOLECALCIFEROL, PO, Take  by mouth., Disp: , Rfl:   •  Wheat Dextrin (BENEFIBER DRINK MIX PO), Take  by mouth., Disp: , Rfl:   •  meclizine (ANTIVERT) 12.5 MG tablet, Take 1 tablet by mouth Every 6 (Six) Hours As Needed., Disp: , Rfl:      Allergies    No Known Allergies    Problem List    Patient Active Problem List   Diagnosis   • Rectal pain   • S/P hemorrhoidectomy 11/1/16   • Urinary retention   • Acute urinary retention   • Chronic low back pain       Medications, Allergies, Problems List and Past History  were reviewed and updated.    Physical Examination    /62 (BP Location: Right arm, Patient Position: Sitting, Cuff Size: Adult)   Pulse 72   Temp 97.1 °F (36.2 °C) (Infrared)   Resp 18   Wt 55.3 kg (122 lb)   LMP  (LMP Unknown) Comment: LAST PAP 2015  Breastfeeding No   BMI 22.31 kg/m²     HEENT: Head- Normocephalic Atraumatic. Facies- Within normal limits. Pinnas- Normal texture and shape bilaterally. Canals- Normal bilaterally. TMs- Normal bilaterally. Nares- Patent bilaterally. Nasal Septum- is normal. There is no tenderness to palpation over the frontal or maxillary sinuses. Lids- Normal bilaterally. Conjunctiva- Clear bilaterally. Sclera- Anicteric bilaterally. Oropharynx- Moist with no lesions. Tonsils- No enlargement, erythema or exudate.    Neck: Thyroid- non enlarged, symmetric and has no nodules. No bruits are detected. ROM- Normal Range of Motion with no rigidity.    Lungs: Auscultation- Clear to auscultation bilaterally. There are no retractions, clubbing or cyanosis. The Expiratory to Inspiratory ratio is equal.    Lymph Nodes: Cervical- no enlarged lymph nodes noted. Clavicular- Deferred. Axillary- Deferred. Inguinal- Deferred.    Cardiovascular: There are no carotid bruits. Heart- Normal Rate with Regular rhythm and no murmurs. There are no gallops. There are no rubs. In the lower extremities there is no edema. The upper extremities do not have edema.    Abdomen: Soft, benign, non-tender with no masses, hernias, organomegaly or scars.    Neurologic Exam:    Cranial Nerves II-XII: Intact    Gait: Normal.    Impression and Assessment    Gastroesophageal Reflux Disease.    Constipation.    Meniere's Disease.    Plan    Gastroesophageal Reflux Disease Plan: The current plan was continued.    Constipation Plan: The current plan was continued.    Meniere's Disease Plan: The current plan was continued.    Brandi was seen today for follow-up.    Diagnoses and all orders for this  visit:    Gastroesophageal reflux disease, esophagitis presence not specified  -     Comprehensive Metabolic Panel    Chronic idiopathic constipation  -     Comprehensive Metabolic Panel    Meniere's disease, unspecified laterality  -     Comprehensive Metabolic Panel        Return to Office    The patient was instructed to return for follow-up in 6 months.    The patient was instructed to return sooner if the condition changes, worsens, or does not resolve.

## 2020-09-16 ENCOUNTER — TRANSCRIBE ORDERS (OUTPATIENT)
Dept: ADMINISTRATIVE | Facility: HOSPITAL | Age: 75
End: 2020-09-16

## 2020-09-16 DIAGNOSIS — K57.30 DIVERTICULOSIS OF COLON: Primary | ICD-10-CM

## 2020-09-22 ENCOUNTER — HOSPITAL ENCOUNTER (OUTPATIENT)
Dept: CT IMAGING | Facility: HOSPITAL | Age: 75
Discharge: HOME | End: 2020-09-22
Admitting: COLON & RECTAL SURGERY

## 2020-09-22 DIAGNOSIS — K57.30 DIVERTICULOSIS OF COLON: ICD-10-CM

## 2020-09-22 PROCEDURE — 74176 CT ABD & PELVIS W/O CONTRAST: CPT

## 2020-09-25 RX ORDER — RALOXIFENE HYDROCHLORIDE 60 MG/1
TABLET, FILM COATED ORAL
Qty: 90 TABLET | Refills: 2 | Status: SHIPPED | OUTPATIENT
Start: 2020-09-25 | End: 2021-07-07

## 2020-10-29 ENCOUNTER — LAB (OUTPATIENT)
Dept: LAB | Facility: HOSPITAL | Age: 75
End: 2020-10-29

## 2020-10-29 ENCOUNTER — TRANSCRIBE ORDERS (OUTPATIENT)
Dept: LAB | Facility: HOSPITAL | Age: 75
End: 2020-10-29

## 2020-10-29 ENCOUNTER — TELEPHONE (OUTPATIENT)
Dept: INTERNAL MEDICINE | Facility: CLINIC | Age: 75
End: 2020-10-29

## 2020-10-29 ENCOUNTER — FLU SHOT (OUTPATIENT)
Dept: INTERNAL MEDICINE | Facility: CLINIC | Age: 75
End: 2020-10-29

## 2020-10-29 DIAGNOSIS — Z23 NEED FOR INFLUENZA VACCINATION: ICD-10-CM

## 2020-10-29 DIAGNOSIS — K59.4 ANAL SPASM: Primary | ICD-10-CM

## 2020-10-29 DIAGNOSIS — K59.4 ANAL SPASM: ICD-10-CM

## 2020-10-29 LAB
ANION GAP SERPL CALCULATED.3IONS-SCNC: 8.8 MMOL/L (ref 5–15)
BUN SERPL-MCNC: 13 MG/DL (ref 8–23)
BUN/CREAT SERPL: 17.3 (ref 7–25)
CALCIUM SPEC-SCNC: 9.2 MG/DL (ref 8.6–10.5)
CHLORIDE SERPL-SCNC: 92 MMOL/L (ref 98–107)
CO2 SERPL-SCNC: 29.2 MMOL/L (ref 22–29)
CREAT SERPL-MCNC: 0.75 MG/DL (ref 0.57–1)
GFR SERPL CREATININE-BSD FRML MDRD: 76 ML/MIN/1.73
GLUCOSE SERPL-MCNC: 91 MG/DL (ref 65–99)
POTASSIUM SERPL-SCNC: 4.2 MMOL/L (ref 3.5–5.2)
SODIUM SERPL-SCNC: 130 MMOL/L (ref 136–145)

## 2020-10-29 PROCEDURE — 80048 BASIC METABOLIC PNL TOTAL CA: CPT

## 2020-10-29 PROCEDURE — 36415 COLL VENOUS BLD VENIPUNCTURE: CPT

## 2020-10-29 PROCEDURE — 90694 VACC AIIV4 NO PRSRV 0.5ML IM: CPT | Performed by: INTERNAL MEDICINE

## 2020-10-29 PROCEDURE — G0008 ADMIN INFLUENZA VIRUS VAC: HCPCS | Performed by: INTERNAL MEDICINE

## 2020-11-03 RX ORDER — PANTOPRAZOLE SODIUM 40 MG/1
TABLET, DELAYED RELEASE ORAL
Qty: 90 TABLET | Refills: 1 | Status: SHIPPED | OUTPATIENT
Start: 2020-11-03 | End: 2021-05-01 | Stop reason: SDUPTHER

## 2020-11-07 DIAGNOSIS — I10 HYPERTENSION, UNSPECIFIED TYPE: Primary | ICD-10-CM

## 2020-11-07 RX ORDER — DOXAZOSIN 2 MG/1
TABLET ORAL
Qty: 90 TABLET | Refills: 1 | Status: SHIPPED | OUTPATIENT
Start: 2020-11-07 | End: 2021-05-06

## 2020-11-07 NOTE — TELEPHONE ENCOUNTER
LOV:09/10/2020  NOV:03/02/2021  Labs:10/29/2020    The patient was instructed to return for follow-up in 6 months.    The patient was instructed to return sooner if the condition changes, worsens, or does not resolve

## 2020-11-28 DIAGNOSIS — G89.29 CHRONIC LOW BACK PAIN WITHOUT SCIATICA, UNSPECIFIED BACK PAIN LATERALITY: ICD-10-CM

## 2020-11-28 DIAGNOSIS — M54.50 CHRONIC LOW BACK PAIN WITHOUT SCIATICA, UNSPECIFIED BACK PAIN LATERALITY: ICD-10-CM

## 2020-11-28 RX ORDER — TRAMADOL HYDROCHLORIDE 50 MG/1
TABLET ORAL
Qty: 120 TABLET | Refills: 1 | Status: SHIPPED | OUTPATIENT
Start: 2020-11-28 | End: 2021-02-01

## 2020-12-08 ENCOUNTER — HOSPITAL ENCOUNTER (OUTPATIENT)
Dept: MAMMOGRAPHY | Facility: HOSPITAL | Age: 75
Discharge: HOME | End: 2020-12-08
Admitting: INTERNAL MEDICINE

## 2020-12-08 DIAGNOSIS — Z12.31 VISIT FOR SCREENING MAMMOGRAM: ICD-10-CM

## 2020-12-08 PROCEDURE — 77067 SCR MAMMO BI INCL CAD: CPT | Performed by: RADIOLOGY

## 2020-12-08 PROCEDURE — 77063 BREAST TOMOSYNTHESIS BI: CPT

## 2020-12-08 PROCEDURE — 77067 SCR MAMMO BI INCL CAD: CPT

## 2020-12-08 PROCEDURE — 77063 BREAST TOMOSYNTHESIS BI: CPT | Performed by: RADIOLOGY

## 2021-01-20 ENCOUNTER — HOSPITAL ENCOUNTER (OUTPATIENT)
Dept: BONE DENSITY | Facility: HOSPITAL | Age: 76
Discharge: HOME | End: 2021-01-20
Admitting: NURSE PRACTITIONER

## 2021-01-20 DIAGNOSIS — Z78.0 POST-MENOPAUSE: ICD-10-CM

## 2021-01-20 PROCEDURE — 77080 DXA BONE DENSITY AXIAL: CPT

## 2021-01-26 PROBLEM — M85.859 OSTEOPENIA OF NECK OF FEMUR: Status: ACTIVE | Noted: 2021-01-26

## 2021-01-28 ENCOUNTER — LAB (OUTPATIENT)
Dept: LAB | Facility: HOSPITAL | Age: 76
End: 2021-01-28

## 2021-01-28 ENCOUNTER — TRANSCRIBE ORDERS (OUTPATIENT)
Dept: LAB | Facility: HOSPITAL | Age: 76
End: 2021-01-28

## 2021-01-28 DIAGNOSIS — R01.1 UNDIAGNOSED CARDIAC MURMURS: Primary | ICD-10-CM

## 2021-01-28 DIAGNOSIS — R01.1 UNDIAGNOSED CARDIAC MURMURS: ICD-10-CM

## 2021-01-28 PROCEDURE — 36415 COLL VENOUS BLD VENIPUNCTURE: CPT

## 2021-01-28 PROCEDURE — 80048 BASIC METABOLIC PNL TOTAL CA: CPT

## 2021-01-29 LAB
ANION GAP SERPL CALCULATED.3IONS-SCNC: 6.6 MMOL/L (ref 5–15)
BUN SERPL-MCNC: 12 MG/DL (ref 8–23)
BUN/CREAT SERPL: 18.8 (ref 7–25)
CALCIUM SPEC-SCNC: 9.2 MG/DL (ref 8.6–10.5)
CHLORIDE SERPL-SCNC: 96 MMOL/L (ref 98–107)
CO2 SERPL-SCNC: 30.4 MMOL/L (ref 22–29)
CREAT SERPL-MCNC: 0.64 MG/DL (ref 0.57–1)
GFR SERPL CREATININE-BSD FRML MDRD: 90 ML/MIN/1.73
GLUCOSE SERPL-MCNC: 112 MG/DL (ref 65–99)
POTASSIUM SERPL-SCNC: 4.1 MMOL/L (ref 3.5–5.2)
SODIUM SERPL-SCNC: 133 MMOL/L (ref 136–145)

## 2021-01-30 DIAGNOSIS — M54.50 CHRONIC LOW BACK PAIN WITHOUT SCIATICA, UNSPECIFIED BACK PAIN LATERALITY: ICD-10-CM

## 2021-01-30 DIAGNOSIS — G89.29 CHRONIC LOW BACK PAIN WITHOUT SCIATICA, UNSPECIFIED BACK PAIN LATERALITY: ICD-10-CM

## 2021-02-01 RX ORDER — TRAMADOL HYDROCHLORIDE 50 MG/1
TABLET ORAL
Qty: 120 TABLET | Refills: 0 | Status: SHIPPED | OUTPATIENT
Start: 2021-02-01 | End: 2021-03-22 | Stop reason: SDUPTHER

## 2021-02-09 RX ORDER — MONTELUKAST SODIUM 10 MG/1
10 TABLET ORAL DAILY
Qty: 90 TABLET | Refills: 1 | Status: SHIPPED | OUTPATIENT
Start: 2021-02-09 | End: 2021-08-02 | Stop reason: SDUPTHER

## 2021-03-02 ENCOUNTER — TRANSCRIBE ORDERS (OUTPATIENT)
Dept: LAB | Facility: HOSPITAL | Age: 76
End: 2021-03-02

## 2021-03-02 ENCOUNTER — OFFICE VISIT (OUTPATIENT)
Dept: INTERNAL MEDICINE | Facility: CLINIC | Age: 76
End: 2021-03-02

## 2021-03-02 ENCOUNTER — LAB (OUTPATIENT)
Dept: LAB | Facility: HOSPITAL | Age: 76
End: 2021-03-02

## 2021-03-02 VITALS
WEIGHT: 126 LBS | BODY MASS INDEX: 23.05 KG/M2 | SYSTOLIC BLOOD PRESSURE: 124 MMHG | DIASTOLIC BLOOD PRESSURE: 70 MMHG | HEART RATE: 76 BPM | RESPIRATION RATE: 16 BRPM | TEMPERATURE: 97.5 F

## 2021-03-02 DIAGNOSIS — G89.29 CHRONIC LOW BACK PAIN WITHOUT SCIATICA, UNSPECIFIED BACK PAIN LATERALITY: Primary | ICD-10-CM

## 2021-03-02 DIAGNOSIS — R01.1 MURMUR: Primary | ICD-10-CM

## 2021-03-02 DIAGNOSIS — K59.04 CHRONIC IDIOPATHIC CONSTIPATION: ICD-10-CM

## 2021-03-02 DIAGNOSIS — I10 ESSENTIAL HYPERTENSION: ICD-10-CM

## 2021-03-02 DIAGNOSIS — R01.1 MURMUR: ICD-10-CM

## 2021-03-02 DIAGNOSIS — K21.9 GASTROESOPHAGEAL REFLUX DISEASE WITHOUT ESOPHAGITIS: ICD-10-CM

## 2021-03-02 DIAGNOSIS — M54.50 CHRONIC LOW BACK PAIN WITHOUT SCIATICA, UNSPECIFIED BACK PAIN LATERALITY: Primary | ICD-10-CM

## 2021-03-02 LAB
ANION GAP SERPL CALCULATED.3IONS-SCNC: 10.6 MMOL/L (ref 5–15)
BUN SERPL-MCNC: 18 MG/DL (ref 8–23)
BUN/CREAT SERPL: 23.1 (ref 7–25)
CALCIUM SPEC-SCNC: 9.1 MG/DL (ref 8.6–10.5)
CHLORIDE SERPL-SCNC: 96 MMOL/L (ref 98–107)
CO2 SERPL-SCNC: 28.4 MMOL/L (ref 22–29)
CREAT SERPL-MCNC: 0.78 MG/DL (ref 0.57–1)
GFR SERPL CREATININE-BSD FRML MDRD: 72 ML/MIN/1.73
GLUCOSE SERPL-MCNC: 84 MG/DL (ref 65–99)
POTASSIUM SERPL-SCNC: 4.3 MMOL/L (ref 3.5–5.2)
SODIUM SERPL-SCNC: 135 MMOL/L (ref 136–145)

## 2021-03-02 PROCEDURE — 36415 COLL VENOUS BLD VENIPUNCTURE: CPT

## 2021-03-02 PROCEDURE — 99214 OFFICE O/P EST MOD 30 MIN: CPT | Performed by: INTERNAL MEDICINE

## 2021-03-02 PROCEDURE — 80048 BASIC METABOLIC PNL TOTAL CA: CPT

## 2021-03-02 NOTE — PROGRESS NOTES
Chief Complaint   Patient presents with   • Follow-up     6 month follow up chronic medical problems       History of Present Illness    The patient presents for a follow-up related to hypertension. The patient reports that she has had no headaches, chest pain, dyspnea, edema, syncope, blurred vision or palpitations. She states that she is taking her medication as prescribed. She is not having medication side effects.    The patient presents for a follow-up related to constipation. The symptoms have been present for a chronic duration. Bowel movements occur approximately every day. There is no blood in the bowel movement. Bowel Movements are painful. There is no associated enuresis. The patient does not report abdominal pain. The patient denies neglecting the urge or putting off bowel movements. There are no new life stressors. Treatment includes Miralax, Amitza and botox injections of anal sphincter. The patient has been compliant with treatment as outlined at the last visit. Her symptoms have improved.    The patient presents for a follow-up related to GERD. The patient is on pantoprazole and Pepcid for her gastroesophageal reflux. The medication is taken on a regular basis and gives complete relief of the symptoms. She reports no belching, diarrhea, dysphagia, early satiety, heartburn, hoarseness, nausea, odynophagia, vomiting or weight loss. The GERD has no known aggravating factors.    The patient presents for follow-up of low back pain. She reports that the pain is stable. The current treatment consists of injections and pain management.  She does not have associated fever, chills, weight loss, rashes, dysuria, hematuria, or a decreased urine stream. The pain does not radiate. There is no numbness. The patient denies loss of bladder control. The patient denies loss of bowel control. The patient reports no aggravating factors.    Review of Systems    CONSTITUTIONAL- Denies Fever, Chills, Sweats, Fatigue,  Weakness or Malaise.    HENT- Denies Nasal Discharge, Sore Throat, Ear Pain, Ear Drainage, Sinus Pain, Nasal Congestion, Decreased Hearing or Tinnitus.    GASTROINTESTINAL- Reports: Constipation..    PULMONARY- Denies Wheezing, Sputum Production, Cough, Hemoptysis or Pleuritic Chest Pain.    CARDIOVASCULAR- Denies Claudication or Irregular Heart Beat.    Medications      Current Outpatient Medications:   •  acetaminophen (TYLENOL) 650 MG 8 hr tablet, Take 1,300 mg by mouth Every 8 (Eight) Hours As Needed for Mild Pain ., Disp: , Rfl:   •  AMITIZA 24 MCG capsule, Take 1 capsule by mouth 2 (Two) Times a Day., Disp: , Rfl:   •  B Complex Vitamins (VITAMIN B COMPLEX PO), Take 1 tablet by mouth Daily., Disp: , Rfl:   •  Calcium-Magnesium-Vitamin D (CALCIUM MAGNESIUM PO), Take  by mouth., Disp: , Rfl:   •  custom compounded cream, Apply  topically. CUSTOM COMPOUNDED RECTAL SUPPOSITORIES BELLADONNA/MORPHINE SULF 16.2/5MG  INSERT 1 SUPPOSITORY TWICE DAILY AS NEEDED, Disp: , Rfl:   •  custom compounded cream, Apply  topically. CUSTOM COMPOUNDED RECTAL SUPPOSITORY HYDROCORTISONE/LIDOCAINE 2.5% / 2% INSERT 1 SUPPOSITORY TWICE DAILY AS NEEDED, Disp: , Rfl:   •  DHA-EPA-Vitamin E (OMEGA-3 COMPLEX PO), Take 1 capsule by mouth Daily., Disp: , Rfl:   •  diazePAM (DIASTAT) 2.5 MG gel, Insert 5 mg into the rectum At Night As Needed., Disp: , Rfl:   •  diclofenac (VOLTAREN) 1 % gel gel, Apply four grams topically to affected areas four times a day as needed for pain., Disp: 100 g, Rfl: 1  •  Dietary Management Product (VASCULERA PO), Take 1 tablet by mouth Daily., Disp: , Rfl:   •  Diphenhydramine-APAP, sleep, (TYLENOL PM EXTRA STRENGTH PO), Take 2 tablets by mouth At Night As Needed., Disp: , Rfl:   •  doxazosin (CARDURA) 2 MG tablet, TAKE ONE TABLET BY MOUTH EVERY EVENING, Disp: 90 tablet, Rfl: 1  •  famotidine (PEPCID) 20 MG tablet, Take 20 mg by mouth 2 (Two) Times a Day As Needed., Disp: , Rfl:   •  fluticasone (FLONASE) 50  MCG/ACT nasal spray, 2 sprays into the nostril(s) as directed by provider Daily., Disp: 16 g, Rfl: 3  •  gabapentin (NEURONTIN) 100 MG capsule, TAKE 3 CAPSULES AT NOON AND 5 CAPSULES AT HS, Disp: , Rfl:   •  Lidocaine, Anorectal, (RECTICARE) 5 % cream cream, Apply  topically 4 (Four) Times a Day As Needed., Disp: , Rfl:   •  loratadine-pseudoephedrine (Claritin-D 12 Hour) 5-120 MG per 12 hr tablet, Take 1 tablet by mouth 2 (Two) Times a Day As Needed for Allergies., Disp: 60 tablet, Rfl: 2  •  meclizine (ANTIVERT) 12.5 MG tablet, Take 1 tablet by mouth Every 6 (Six) Hours As Needed., Disp: , Rfl:   •  montelukast (SINGULAIR) 10 MG tablet, Take 1 tablet by mouth Daily., Disp: 90 tablet, Rfl: 1  •  pantoprazole (PROTONIX) 40 MG EC tablet, TAKE ONE TABLET BY MOUTH DAILY, Disp: 90 tablet, Rfl: 1  •  polyethylene glycol (MIRALAX) powder, Take 17 g by mouth Daily As Needed (constipation)., Disp: , Rfl:   •  Probiotic Product (PROBIOTIC DAILY PO), Take 1 tablet by mouth Daily., Disp: , Rfl:   •  raloxifene (EVISTA) 60 MG tablet, TAKE ONE TABLET BY MOUTH DAILY, Disp: 90 tablet, Rfl: 2  •  Simethicone (GAS-X PO), Take  by mouth., Disp: , Rfl:   •  traMADol (ULTRAM) 50 MG tablet, TAKE ONE TABLET BY MOUTH EVERY 6 HOURS AS NEEDED FOR MODERATE PAIN, Disp: 120 tablet, Rfl: 0  •  triamterene-hydrochlorothiazide (DYAZIDE) 37.5-25 MG per capsule, Take 1 capsule by mouth Daily., Disp: , Rfl:   •  VITAMIN D, CHOLECALCIFEROL, PO, Take  by mouth., Disp: , Rfl:   •  Wheat Dextrin (BENEFIBER DRINK MIX PO), Take  by mouth., Disp: , Rfl:      Allergies    No Known Allergies    Problem List    Patient Active Problem List   Diagnosis   • Rectal pain   • S/P hemorrhoidectomy 11/1/16   • Urinary retention   • Acute urinary retention   • Chronic low back pain   • Osteopenia of neck of femur       Medications, Allergies, Problems List and Past History were reviewed and updated.    Physical Examination    /70 (BP Location: Right arm, Patient  Position: Sitting, Cuff Size: Adult)   Pulse 76   Temp 97.5 °F (36.4 °C) (Infrared)   Resp 16   Wt 57.2 kg (126 lb)   LMP  (LMP Unknown) Comment: LAST PAP 2015  Breastfeeding No   BMI 23.05 kg/m²     HEENT: Head- Normocephalic Atraumatic. Facies- Within normal limits. Pinnas- Normal texture and shape bilaterally. Canals- Normal bilaterally. TMs- Normal bilaterally. Nares- Patent bilaterally. Nasal Septum- is normal. There is no tenderness to palpation over the frontal or maxillary sinuses. Lids- Normal bilaterally. Conjunctiva- Clear bilaterally. Sclera- Anicteric bilaterally. Oropharynx- Moist with no lesions. Tonsils- No enlargement, erythema or exudate.    Neck: Thyroid- non enlarged, symmetric and has no nodules. No bruits are detected.    Lungs: Auscultation- Clear to auscultation bilaterally. There are no retractions, clubbing or cyanosis. The Expiratory to Inspiratory ratio is equal.    Lymph Nodes: Cervical- no enlarged lymph nodes noted.    Cardiovascular: There are no carotid bruits. Heart- Normal Rate with Regular rhythm and no murmurs. There are no gallops. There are no rubs. In the lower extremities there is no edema. The upper extremities do not have edema.    Abdomen: Soft, benign, non-tender with no masses, hernias, organomegaly or scars.    Back: The patient has a normal spinal curvature with no evidence of scoliosis. There are no skin lesions noted on the back. Palpation reveals no tenderness and normal muscle tone. The straight leg raising test is negative. The back has normal flexion, extension, rotation, and lateral bending.    Impression and Assessment    Essential Hypertension.    Constipation.    Gastroesophageal Reflux Disease.    Low Back Pain.    Plan    Constipation Plan: The current plan was continued.    Gastroesophageal Reflux Disease Plan: The current plan was continued.    Essential Hypertension Plan: The patient was instructed to continue the current medications.    Low Back  Pain Plan: She will follow-up with her pain management specialist. The patient was instructed to continue the current medications.    Diagnoses and all orders for this visit:    1. Chronic low back pain without sciatica, unspecified back pain laterality (Primary)    2. Essential hypertension    3. Chronic idiopathic constipation    4. Gastroesophageal reflux disease without esophagitis        Return to Office    The patient was instructed to return for follow-up in 6 months. The patient was instructed to return sooner if the condition changes, worsens, or does not resolve.

## 2021-03-22 DIAGNOSIS — G89.29 CHRONIC LOW BACK PAIN WITHOUT SCIATICA, UNSPECIFIED BACK PAIN LATERALITY: ICD-10-CM

## 2021-03-22 DIAGNOSIS — M54.50 CHRONIC LOW BACK PAIN WITHOUT SCIATICA, UNSPECIFIED BACK PAIN LATERALITY: ICD-10-CM

## 2021-03-22 RX ORDER — TRAMADOL HYDROCHLORIDE 50 MG/1
50 TABLET ORAL EVERY 6 HOURS PRN
Qty: 120 TABLET | Refills: 0 | Status: SHIPPED | OUTPATIENT
Start: 2021-03-22 | End: 2021-04-26 | Stop reason: SDUPTHER

## 2021-04-26 DIAGNOSIS — M54.50 CHRONIC LOW BACK PAIN WITHOUT SCIATICA, UNSPECIFIED BACK PAIN LATERALITY: ICD-10-CM

## 2021-04-26 DIAGNOSIS — G89.29 CHRONIC LOW BACK PAIN WITHOUT SCIATICA, UNSPECIFIED BACK PAIN LATERALITY: ICD-10-CM

## 2021-04-26 RX ORDER — TRAMADOL HYDROCHLORIDE 50 MG/1
50 TABLET ORAL EVERY 6 HOURS PRN
Qty: 120 TABLET | Refills: 0 | Status: SHIPPED | OUTPATIENT
Start: 2021-04-26 | End: 2021-06-13 | Stop reason: SDUPTHER

## 2021-05-03 RX ORDER — PANTOPRAZOLE SODIUM 40 MG/1
40 TABLET, DELAYED RELEASE ORAL DAILY
Qty: 90 TABLET | Refills: 1 | Status: SHIPPED | OUTPATIENT
Start: 2021-05-03 | End: 2021-11-01 | Stop reason: SDUPTHER

## 2021-05-06 DIAGNOSIS — I10 HYPERTENSION, UNSPECIFIED TYPE: ICD-10-CM

## 2021-05-06 RX ORDER — DOXAZOSIN 2 MG/1
TABLET ORAL
Qty: 90 TABLET | Refills: 0 | Status: SHIPPED | OUTPATIENT
Start: 2021-05-06 | End: 2021-08-02

## 2021-06-13 DIAGNOSIS — G89.29 CHRONIC LOW BACK PAIN WITHOUT SCIATICA, UNSPECIFIED BACK PAIN LATERALITY: ICD-10-CM

## 2021-06-13 DIAGNOSIS — M54.50 CHRONIC LOW BACK PAIN WITHOUT SCIATICA, UNSPECIFIED BACK PAIN LATERALITY: ICD-10-CM

## 2021-06-14 RX ORDER — TRAMADOL HYDROCHLORIDE 50 MG/1
50 TABLET ORAL EVERY 6 HOURS PRN
Qty: 120 TABLET | Refills: 0 | Status: SHIPPED | OUTPATIENT
Start: 2021-06-14 | End: 2021-07-20 | Stop reason: SDUPTHER

## 2021-07-07 RX ORDER — RALOXIFENE HYDROCHLORIDE 60 MG/1
TABLET, FILM COATED ORAL
Qty: 90 TABLET | Refills: 0 | Status: SHIPPED | OUTPATIENT
Start: 2021-07-07 | End: 2021-10-04

## 2021-07-20 DIAGNOSIS — G89.29 CHRONIC LOW BACK PAIN WITHOUT SCIATICA, UNSPECIFIED BACK PAIN LATERALITY: ICD-10-CM

## 2021-07-20 DIAGNOSIS — M54.50 CHRONIC LOW BACK PAIN WITHOUT SCIATICA, UNSPECIFIED BACK PAIN LATERALITY: ICD-10-CM

## 2021-07-21 RX ORDER — TRAMADOL HYDROCHLORIDE 50 MG/1
50 TABLET ORAL EVERY 6 HOURS PRN
Qty: 120 TABLET | Refills: 0 | Status: SHIPPED | OUTPATIENT
Start: 2021-07-21 | End: 2021-08-25 | Stop reason: SDUPTHER

## 2021-07-28 ENCOUNTER — OFFICE VISIT (OUTPATIENT)
Dept: INTERNAL MEDICINE | Facility: CLINIC | Age: 76
End: 2021-07-28

## 2021-07-28 VITALS
DIASTOLIC BLOOD PRESSURE: 63 MMHG | SYSTOLIC BLOOD PRESSURE: 123 MMHG | RESPIRATION RATE: 20 BRPM | BODY MASS INDEX: 23.07 KG/M2 | TEMPERATURE: 97.3 F | WEIGHT: 126.13 LBS | HEART RATE: 80 BPM

## 2021-07-28 DIAGNOSIS — K59.04 CHRONIC IDIOPATHIC CONSTIPATION: ICD-10-CM

## 2021-07-28 DIAGNOSIS — K21.9 GASTROESOPHAGEAL REFLUX DISEASE WITHOUT ESOPHAGITIS: ICD-10-CM

## 2021-07-28 DIAGNOSIS — R07.9 CHEST PAIN, UNSPECIFIED TYPE: ICD-10-CM

## 2021-07-28 DIAGNOSIS — I10 ESSENTIAL HYPERTENSION: Primary | ICD-10-CM

## 2021-07-28 PROCEDURE — 36415 COLL VENOUS BLD VENIPUNCTURE: CPT | Performed by: INTERNAL MEDICINE

## 2021-07-28 PROCEDURE — 99214 OFFICE O/P EST MOD 30 MIN: CPT | Performed by: INTERNAL MEDICINE

## 2021-07-28 PROCEDURE — 80053 COMPREHEN METABOLIC PANEL: CPT | Performed by: INTERNAL MEDICINE

## 2021-07-28 PROCEDURE — 93000 ELECTROCARDIOGRAM COMPLETE: CPT | Performed by: INTERNAL MEDICINE

## 2021-07-28 NOTE — PROGRESS NOTES
Chief Complaint   Patient presents with   • Hypertension     4 MONTH FOLLOW UP   • Back Pain       History of Present Illness      The patient presents for a follow-up related to hypertension. The patient reports that she has had chest pain but she denies having headaches, dyspnea, edema, syncope, blurred vision or palpitations. She states that she is taking her medication as prescribed. She is not having medication side effects.    The patient presents for a follow-up related to GERD. The patient is on pantoprazole and Pepcid for her gastroesophageal reflux. The medication is taken on a regular basis and gives complete relief of the symptoms. She reports no abdominal pain, belching, diarrhea, dysphagia, early satiety, heartburn, hoarseness, nausea, odynophagia, rectal bleeding or vomiting. The GERD has no known aggravating factors.    The patient presents for a follow-up related to constipation. The symptoms have been present for a chronic duration. Bowel movements occur approximately every day. The stools are soft. There is no blood in the bowel movement. Bowel Movements are painful. There is no associated enuresis. The patient does not report abdominal pain. The patient denies neglecting the urge or putting off bowel movements. Treatment includes increasing fiber. The patient has been compliant with treatment as outlined at the last visit. Her symptoms have improved.    The patient presents for an acute visit for constant chest pain. There is a one week history of chest pain. The patient has noted no alleviating factors. The pain onset was sudden. The pain is not made worse with activity and it is relieved with rest. The pain is not made worse with deep breaths. It is characterized as burning and heartburn. The pain does not radiate. The patient states the pain is in the left chest. The pain is not associated with clamminess, diaphoresis, dizziness, fatigue, fever, indigestion or lightheadedness.    Review of  Systems    CONSTITUTIONAL- Denies Chills, Weakness or Malaise.    HENT- Denies Nasal Discharge, Sore Throat, Ear Pain, Ear Drainage, Sinus Pain, Nasal Congestion, Decreased Hearing or Tinnitus.    GASTROINTESTINAL- Reports: Constipation..    PULMONARY- Denies Wheezing, Sputum Production, Cough, Hemoptysis or Pleuritic Chest Pain.    CARDIOVASCULAR- Denies Claudication.    Medications      Current Outpatient Medications:   •  acetaminophen (TYLENOL) 650 MG 8 hr tablet, Take 1,300 mg by mouth Every 8 (Eight) Hours As Needed for Mild Pain ., Disp: , Rfl:   •  AMITIZA 24 MCG capsule, Take 1 capsule by mouth 2 (Two) Times a Day., Disp: , Rfl:   •  B Complex Vitamins (VITAMIN B COMPLEX PO), Take 1 tablet by mouth Daily., Disp: , Rfl:   •  Calcium-Magnesium-Vitamin D (CALCIUM MAGNESIUM PO), Take  by mouth., Disp: , Rfl:   •  custom compounded cream, Apply  topically. CUSTOM COMPOUNDED RECTAL SUPPOSITORIES BELLADONNA/MORPHINE SULF 16.2/5MG  INSERT 1 SUPPOSITORY TWICE DAILY AS NEEDED, Disp: , Rfl:   •  custom compounded cream, Apply  topically. CUSTOM COMPOUNDED RECTAL SUPPOSITORY HYDROCORTISONE/LIDOCAINE 2.5% / 2% INSERT 1 SUPPOSITORY TWICE DAILY AS NEEDED, Disp: , Rfl:   •  DHA-EPA-Vitamin E (OMEGA-3 COMPLEX PO), Take 1 capsule by mouth Daily., Disp: , Rfl:   •  diazePAM (DIASTAT) 2.5 MG gel, Insert 5 mg into the rectum At Night As Needed., Disp: , Rfl:   •  diclofenac (VOLTAREN) 1 % gel gel, Apply four grams topically to affected areas four times a day as needed for pain., Disp: 100 g, Rfl: 1  •  Dietary Management Product (VASCULERA PO), Take 1 tablet by mouth Daily., Disp: , Rfl:   •  Diphenhydramine-APAP, sleep, (TYLENOL PM EXTRA STRENGTH PO), Take 2 tablets by mouth At Night As Needed., Disp: , Rfl:   •  doxazosin (CARDURA) 2 MG tablet, TAKE ONE TABLET BY MOUTH EVERY EVENING, Disp: 90 tablet, Rfl: 0  •  famotidine (PEPCID) 20 MG tablet, Take 20 mg by mouth 2 (Two) Times a Day As Needed., Disp: , Rfl:   •  fluticasone  (FLONASE) 50 MCG/ACT nasal spray, 2 sprays into the nostril(s) as directed by provider Daily., Disp: 16 g, Rfl: 3  •  gabapentin (NEURONTIN) 100 MG capsule, TAKE 3 CAPSULES AT NOON AND 5 CAPSULES AT HS, Disp: , Rfl:   •  Lidocaine, Anorectal, (RECTICARE) 5 % cream cream, Apply  topically 4 (Four) Times a Day As Needed., Disp: , Rfl:   •  loratadine-pseudoephedrine (Claritin-D 12 Hour) 5-120 MG per 12 hr tablet, Take 1 tablet by mouth 2 (Two) Times a Day As Needed for Allergies., Disp: 60 tablet, Rfl: 2  •  MAGNESIUM GLUCONATE PO, Take 350 mg of amoxicillin by mouth Daily., Disp: , Rfl:   •  meclizine (ANTIVERT) 12.5 MG tablet, Take 1 tablet by mouth Every 6 (Six) Hours As Needed., Disp: , Rfl:   •  montelukast (SINGULAIR) 10 MG tablet, Take 1 tablet by mouth Daily., Disp: 90 tablet, Rfl: 1  •  pantoprazole (PROTONIX) 40 MG EC tablet, Take 1 tablet by mouth Daily., Disp: 90 tablet, Rfl: 1  •  polyethylene glycol (MIRALAX) powder, Take 17 g by mouth Daily As Needed (constipation)., Disp: , Rfl:   •  Probiotic Product (PROBIOTIC DAILY PO), Take 1 tablet by mouth Daily., Disp: , Rfl:   •  raloxifene (EVISTA) 60 MG tablet, TAKE ONE TABLET BY MOUTH DAILY, Disp: 90 tablet, Rfl: 0  •  Simethicone (GAS-X PO), Take  by mouth., Disp: , Rfl:   •  traMADol (ULTRAM) 50 MG tablet, Take 1 tablet by mouth Every 6 (Six) Hours As Needed for Moderate Pain ., Disp: 120 tablet, Rfl: 0  •  triamterene-hydrochlorothiazide (DYAZIDE) 37.5-25 MG per capsule, Take 1 capsule by mouth Daily., Disp: , Rfl:   •  VITAMIN D, CHOLECALCIFEROL, PO, Take  by mouth., Disp: , Rfl:   •  Wheat Dextrin (BENEFIBER DRINK MIX PO), Take  by mouth., Disp: , Rfl:      Allergies    Allergies   Allergen Reactions   • Motrin [Ibuprofen] Nausea Only       Problem List    Patient Active Problem List   Diagnosis   • Rectal pain   • S/P hemorrhoidectomy 11/1/16   • Urinary retention   • Acute urinary retention   • Chronic low back pain   • Osteopenia of neck of femur        Medications, Allergies, Problems List and Past History were reviewed and updated.    Physical Examination    /63   Pulse 80   Temp 97.3 °F (36.3 °C) (Temporal)   Resp 20   Wt 57.2 kg (126 lb 2 oz)   LMP  (LMP Unknown) Comment: LAST PAP 2015  BMI 23.07 kg/m²     HEENT: Head- Normocephalic Atraumatic. Facies- Within normal limits. Pinnas- Normal texture and shape bilaterally. Canals- Normal bilaterally. TMs- Normal bilaterally. Nares- Patent bilaterally. Nasal Septum- is normal. There is no tenderness to palpation over the frontal or maxillary sinuses. Lids- Normal bilaterally. Conjunctiva- Clear bilaterally. Sclera- Anicteric bilaterally. Oropharynx- Moist with no lesions. Tonsils- No enlargement, erythema or exudate.    Neck: Thyroid- non enlarged, symmetric and has no nodules. No bruits are detected. ROM- Normal Range of Motion with no rigidity.    Lungs: Auscultation- Clear to auscultation bilaterally. There are no retractions, clubbing or cyanosis. The Expiratory to Inspiratory ratio is equal.    Lymph Nodes: Cervical- no enlarged lymph nodes noted. Clavicular- no enlarged supraclavicular lymph nodes noted. Axillary- no enlarged axillary lymph nodes noted. Inguinal- no enlarged inguinal lymph nodes noted.    Cardiovascular: There are no carotid bruits. Heart- Normal Rate with Regular rhythm and no murmurs. There are no gallops. There are no rubs. In the lower extremities there is no edema. The upper extremities do not have edema.    Abdomen: Soft, benign, non-tender with no masses, hernias, organomegaly or scars.      ECG 12 Lead    Date/Time: 7/28/2021 4:10 PM  Performed by: Abram Myers MD  Authorized by: Abram Myers MD   Comparison: not compared with previous ECG   Previous ECG: no previous ECG available  Rhythm: sinus rhythm  Rate: normal  Conduction: conduction normal  ST Segments: ST segments normal  T Waves: T waves normal  QRS axis: normal  Other: no other  findings    Clinical impression: normal ECG            Impression and Assessment    Essential Hypertension.    Gastroesophageal Reflux Disease.    Constipation.    Chest Pain.    Plan    Gastroesophageal Reflux Disease Plan: The patient was instructed to continue the current medications.    Constipation Plan: The patient was instructed to continue the current medications.    Essential Hypertension Plan: The patient was instructed to continue the current medications.    Chest Pain Plan: She is going to call her cardiologist for an evaluation. This pain does not sound cardiac in orign however.    Diagnoses and all orders for this visit:    1. Essential hypertension (Primary)  -     ECG 12 Lead  -     Comprehensive Metabolic Panel    2. Chronic idiopathic constipation    3. Gastroesophageal reflux disease without esophagitis    4. Chest pain, unspecified type  -     ECG 12 Lead        Return to Office    The patient was instructed to return for follow-up in 1 month. The patient was instructed to return sooner if the condition changes, worsens, or does not resolve.

## 2021-07-29 LAB
ALBUMIN SERPL-MCNC: 4.1 G/DL (ref 3.5–5.2)
ALBUMIN/GLOB SERPL: 1.6 G/DL
ALP SERPL-CCNC: 44 U/L (ref 39–117)
ALT SERPL W P-5'-P-CCNC: 10 U/L (ref 1–33)
ANION GAP SERPL CALCULATED.3IONS-SCNC: 9.7 MMOL/L (ref 5–15)
AST SERPL-CCNC: 16 U/L (ref 1–32)
BILIRUB SERPL-MCNC: 0.2 MG/DL (ref 0–1.2)
BUN SERPL-MCNC: 12 MG/DL (ref 8–23)
BUN/CREAT SERPL: 19.7 (ref 7–25)
CALCIUM SPEC-SCNC: 8.7 MG/DL (ref 8.6–10.5)
CHLORIDE SERPL-SCNC: 96 MMOL/L (ref 98–107)
CO2 SERPL-SCNC: 27.3 MMOL/L (ref 22–29)
CREAT SERPL-MCNC: 0.61 MG/DL (ref 0.57–1)
GFR SERPL CREATININE-BSD FRML MDRD: 96 ML/MIN/1.73
GLOBULIN UR ELPH-MCNC: 2.6 GM/DL
GLUCOSE SERPL-MCNC: 69 MG/DL (ref 65–99)
POTASSIUM SERPL-SCNC: 3.8 MMOL/L (ref 3.5–5.2)
PROT SERPL-MCNC: 6.7 G/DL (ref 6–8.5)
SODIUM SERPL-SCNC: 133 MMOL/L (ref 136–145)

## 2021-08-01 DIAGNOSIS — I10 HYPERTENSION, UNSPECIFIED TYPE: ICD-10-CM

## 2021-08-02 RX ORDER — MONTELUKAST SODIUM 10 MG/1
10 TABLET ORAL DAILY
Qty: 90 TABLET | Refills: 1 | Status: SHIPPED | OUTPATIENT
Start: 2021-08-02 | End: 2022-02-15 | Stop reason: SDUPTHER

## 2021-08-02 RX ORDER — DOXAZOSIN 2 MG/1
TABLET ORAL
Qty: 90 TABLET | Refills: 1 | Status: SHIPPED | OUTPATIENT
Start: 2021-08-02 | End: 2022-04-11

## 2021-08-25 DIAGNOSIS — G89.29 CHRONIC LOW BACK PAIN WITHOUT SCIATICA, UNSPECIFIED BACK PAIN LATERALITY: ICD-10-CM

## 2021-08-25 DIAGNOSIS — M54.50 CHRONIC LOW BACK PAIN WITHOUT SCIATICA, UNSPECIFIED BACK PAIN LATERALITY: ICD-10-CM

## 2021-08-27 RX ORDER — TRAMADOL HYDROCHLORIDE 50 MG/1
50 TABLET ORAL EVERY 6 HOURS PRN
Qty: 120 TABLET | Refills: 0 | Status: SHIPPED | OUTPATIENT
Start: 2021-08-27 | End: 2021-10-04 | Stop reason: SDUPTHER

## 2021-10-04 DIAGNOSIS — G89.29 CHRONIC LOW BACK PAIN WITHOUT SCIATICA, UNSPECIFIED BACK PAIN LATERALITY: ICD-10-CM

## 2021-10-04 DIAGNOSIS — M54.50 CHRONIC LOW BACK PAIN WITHOUT SCIATICA, UNSPECIFIED BACK PAIN LATERALITY: ICD-10-CM

## 2021-10-04 RX ORDER — RALOXIFENE HYDROCHLORIDE 60 MG/1
TABLET, FILM COATED ORAL
Qty: 90 TABLET | Refills: 1 | Status: SHIPPED | OUTPATIENT
Start: 2021-10-04 | End: 2022-04-11 | Stop reason: SDUPTHER

## 2021-10-06 RX ORDER — TRAMADOL HYDROCHLORIDE 50 MG/1
50 TABLET ORAL EVERY 6 HOURS PRN
Qty: 120 TABLET | Refills: 0 | Status: SHIPPED | OUTPATIENT
Start: 2021-10-06 | End: 2021-11-11 | Stop reason: SDUPTHER

## 2021-11-01 RX ORDER — PANTOPRAZOLE SODIUM 40 MG/1
40 TABLET, DELAYED RELEASE ORAL DAILY
Qty: 90 TABLET | Refills: 1 | Status: SHIPPED | OUTPATIENT
Start: 2021-11-01 | End: 2022-05-01 | Stop reason: SDUPTHER

## 2021-11-11 DIAGNOSIS — G89.29 CHRONIC LOW BACK PAIN WITHOUT SCIATICA, UNSPECIFIED BACK PAIN LATERALITY: ICD-10-CM

## 2021-11-11 DIAGNOSIS — M54.50 CHRONIC LOW BACK PAIN WITHOUT SCIATICA, UNSPECIFIED BACK PAIN LATERALITY: ICD-10-CM

## 2021-11-11 RX ORDER — TRAMADOL HYDROCHLORIDE 50 MG/1
50 TABLET ORAL EVERY 6 HOURS PRN
Qty: 120 TABLET | Refills: 0 | Status: SHIPPED | OUTPATIENT
Start: 2021-11-11 | End: 2021-12-14 | Stop reason: SDUPTHER

## 2021-12-07 ENCOUNTER — LAB (OUTPATIENT)
Dept: LAB | Facility: HOSPITAL | Age: 76
End: 2021-12-07

## 2021-12-07 ENCOUNTER — HOSPITAL ENCOUNTER (OUTPATIENT)
Dept: GENERAL RADIOLOGY | Facility: HOSPITAL | Age: 76
Discharge: HOME | End: 2021-12-07

## 2021-12-07 ENCOUNTER — OFFICE VISIT (OUTPATIENT)
Dept: INTERNAL MEDICINE | Facility: CLINIC | Age: 76
End: 2021-12-07

## 2021-12-07 VITALS
BODY MASS INDEX: 24.59 KG/M2 | TEMPERATURE: 97.1 F | WEIGHT: 133.6 LBS | RESPIRATION RATE: 18 BRPM | OXYGEN SATURATION: 96 % | SYSTOLIC BLOOD PRESSURE: 120 MMHG | HEIGHT: 62 IN | DIASTOLIC BLOOD PRESSURE: 78 MMHG | HEART RATE: 85 BPM

## 2021-12-07 DIAGNOSIS — K21.9 GASTROESOPHAGEAL REFLUX DISEASE WITHOUT ESOPHAGITIS: ICD-10-CM

## 2021-12-07 DIAGNOSIS — I10 ESSENTIAL HYPERTENSION: Primary | ICD-10-CM

## 2021-12-07 DIAGNOSIS — I10 ESSENTIAL HYPERTENSION: ICD-10-CM

## 2021-12-07 DIAGNOSIS — M25.551 RIGHT HIP PAIN: ICD-10-CM

## 2021-12-07 PROCEDURE — 99214 OFFICE O/P EST MOD 30 MIN: CPT | Performed by: INTERNAL MEDICINE

## 2021-12-07 PROCEDURE — 80053 COMPREHEN METABOLIC PANEL: CPT

## 2021-12-07 PROCEDURE — 73502 X-RAY EXAM HIP UNI 2-3 VIEWS: CPT

## 2021-12-07 RX ORDER — METHYLPREDNISOLONE 4 MG/1
TABLET ORAL
Qty: 21 TABLET | Refills: 0 | Status: SHIPPED | OUTPATIENT
Start: 2021-12-07 | End: 2022-02-07

## 2021-12-07 RX ORDER — KETOCONAZOLE 20 MG/G
CREAM TOPICAL
COMMUNITY
Start: 2021-11-02 | End: 2022-04-11

## 2021-12-07 NOTE — PROGRESS NOTES
Chief Complaint   Patient presents with   • right hip pain     x  1week       History of Present Illness    The patient presents for a follow-up related to hypertension. The patient reports that she has had no headaches, chest pain, dyspnea, edema, syncope, blurred vision or palpitations. She states that she is taking her medication as prescribed. She is not having medication side effects.    The patient presents for a follow-up related to GERD. The patient is on famotidine and pantoprazole for her gastroesophageal reflux. The medication is taken on a regular basis and gives complete relief of the symptoms. She reports no abdominal pain, belching, diarrhea, dysphagia, early satiety, heartburn, hoarseness, nausea, odynophagia, rectal bleeding, vomiting or weight loss. The GERD has no known aggravating factors.    The patient presents with pain in the right hip of one week duration. There is no history of trauma. The patient has no joint swelling. There is no stiffness. The patient denies a history of overuse or repetitive motion with the affected joints.    The patient denies dry eyes, fevers, cough, dry mouth or hematuria. There are no associated insect bites. There is no family history of rheumatoid arthritis, juvenile rheumatoid arthritis, systemic lupus erythematosis or gout. The patient denies a personal history of malignancy.    The joint pain is aggravated by motion and walking. The pain has no alleviating factors noted.    Review of Systems    CONSTITUTIONAL- Denies Chills, Sweats, Fatigue, Weakness or Malaise.    PULMONARY- Denies Wheezing, Sputum Production, Hemoptysis or Pleuritic Chest Pain.    CARDIOVASCULAR- Denies Claudication or Irregular Heart Beat.    Medications      Current Outpatient Medications:   •  acetaminophen (TYLENOL) 650 MG 8 hr tablet, Take 1,300 mg by mouth Every 8 (Eight) Hours As Needed for Mild Pain ., Disp: , Rfl:   •  AMITIZA 24 MCG capsule, Take 1 capsule by mouth 2 (Two) Times a  Day., Disp: , Rfl:   •  B Complex Vitamins (VITAMIN B COMPLEX PO), Take 1 tablet by mouth Daily., Disp: , Rfl:   •  Calcium-Magnesium-Vitamin D (CALCIUM MAGNESIUM PO), Take  by mouth., Disp: , Rfl:   •  custom compounded cream, Apply  topically. CUSTOM COMPOUNDED RECTAL SUPPOSITORIES BELLADONNA/MORPHINE SULF 16.2/5MG  INSERT 1 SUPPOSITORY TWICE DAILY AS NEEDED, Disp: , Rfl:   •  custom compounded cream, Apply  topically. CUSTOM COMPOUNDED RECTAL SUPPOSITORY HYDROCORTISONE/LIDOCAINE 2.5% / 2% INSERT 1 SUPPOSITORY TWICE DAILY AS NEEDED, Disp: , Rfl:   •  DHA-EPA-Vitamin E (OMEGA-3 COMPLEX PO), Take 1 capsule by mouth Daily., Disp: , Rfl:   •  diazePAM (DIASTAT) 2.5 MG gel, Insert 5 mg into the rectum At Night As Needed., Disp: , Rfl:   •  diclofenac (VOLTAREN) 1 % gel gel, Apply four grams topically to affected areas four times a day as needed for pain., Disp: 100 g, Rfl: 1  •  Dietary Management Product (VASCULERA PO), Take 1 tablet by mouth Daily., Disp: , Rfl:   •  Diphenhydramine-APAP, sleep, (TYLENOL PM EXTRA STRENGTH PO), Take 2 tablets by mouth At Night As Needed., Disp: , Rfl:   •  doxazosin (CARDURA) 2 MG tablet, TAKE ONE TABLET BY MOUTH EVERY EVENING, Disp: 90 tablet, Rfl: 1  •  famotidine (PEPCID) 20 MG tablet, Take 20 mg by mouth 2 (Two) Times a Day As Needed., Disp: , Rfl:   •  fluticasone (FLONASE) 50 MCG/ACT nasal spray, 2 sprays into the nostril(s) as directed by provider Daily., Disp: 16 g, Rfl: 3  •  gabapentin (NEURONTIN) 100 MG capsule, TAKE 3 CAPSULES AT NOON AND 5 CAPSULES AT HS, Disp: , Rfl:   •  ketoconazole (NIZORAL) 2 % cream, , Disp: , Rfl:   •  Lidocaine, Anorectal, (RECTICARE) 5 % cream cream, Apply  topically 4 (Four) Times a Day As Needed., Disp: , Rfl:   •  loratadine-pseudoephedrine (Claritin-D 12 Hour) 5-120 MG per 12 hr tablet, Take 1 tablet by mouth 2 (Two) Times a Day As Needed for Allergies., Disp: 60 tablet, Rfl: 2  •  MAGNESIUM GLUCONATE PO, Take 350 mg of amoxicillin by mouth  "Daily., Disp: , Rfl:   •  meclizine (ANTIVERT) 12.5 MG tablet, Take 1 tablet by mouth Every 6 (Six) Hours As Needed., Disp: , Rfl:   •  montelukast (SINGULAIR) 10 MG tablet, Take 1 tablet by mouth Daily., Disp: 90 tablet, Rfl: 1  •  pantoprazole (PROTONIX) 40 MG EC tablet, Take 1 tablet by mouth Daily., Disp: 90 tablet, Rfl: 1  •  polyethylene glycol (MIRALAX) powder, Take 17 g by mouth Daily As Needed (constipation)., Disp: , Rfl:   •  Probiotic Product (PROBIOTIC DAILY PO), Take 1 tablet by mouth Daily., Disp: , Rfl:   •  raloxifene (EVISTA) 60 MG tablet, TAKE ONE TABLET BY MOUTH DAILY, Disp: 90 tablet, Rfl: 1  •  Simethicone (GAS-X PO), Take  by mouth., Disp: , Rfl:   •  traMADol (ULTRAM) 50 MG tablet, Take 1 tablet by mouth Every 6 (Six) Hours As Needed for Moderate Pain ., Disp: 120 tablet, Rfl: 0  •  triamterene-hydrochlorothiazide (DYAZIDE) 37.5-25 MG per capsule, Take 1 capsule by mouth Daily., Disp: , Rfl:   •  VITAMIN D, CHOLECALCIFEROL, PO, Take  by mouth., Disp: , Rfl:   •  Wheat Dextrin (BENEFIBER DRINK MIX PO), Take  by mouth., Disp: , Rfl:   •  methylPREDNISolone (MEDROL) 4 MG dose pack, Take as directed on package instructions., Disp: 21 tablet, Rfl: 0     Allergies    Allergies   Allergen Reactions   • Motrin [Ibuprofen] Nausea Only       Problem List    Patient Active Problem List   Diagnosis   • Rectal pain   • S/P hemorrhoidectomy 11/1/16   • Urinary retention   • Acute urinary retention   • Chronic low back pain   • Osteopenia of neck of femur       Medications, Allergies, Problems List and Past History were reviewed and updated.    Physical Examination    /78   Pulse 85   Temp 97.1 °F (36.2 °C) (Infrared)   Resp 18   Ht 157.5 cm (62\")   Wt 60.6 kg (133 lb 9.6 oz)   LMP  (LMP Unknown) Comment: LAST PAP 2015  SpO2 96%   BMI 24.44 kg/m²     HEENT: Head- Normocephalic Atraumatic. Facies- Within normal limits. Pinnas- Normal texture and shape bilaterally. Canals- Normal bilaterally. " TMs- Normal bilaterally. Nares- Patent bilaterally. Nasal Septum- is normal. There is no tenderness to palpation over the frontal or maxillary sinuses. Lids- Normal bilaterally. Conjunctiva- Clear bilaterally. Sclera- Anicteric bilaterally. Oropharynx- Moist with no lesions. Tonsils- No enlargement, erythema or exudate.    Neck: Thyroid- non enlarged, symmetric and has no nodules. No bruits are detected. ROM- Normal Range of Motion with no rigidity.    Lungs: Auscultation- Clear to auscultation bilaterally. There are no retractions, clubbing or cyanosis. The Expiratory to Inspiratory ratio is equal.    Cardiovascular: There are no carotid bruits. Heart- Normal Rate with Regular rhythm and no murmurs. There are no gallops. There are no rubs. In the lower extremities there is no edema. The upper extremities do not have edema.    Abdomen: Soft, benign, non-tender with no masses, hernias, organomegaly or scars.    Hips: The right hip has normal brian landmarks and no noted atrophy. Right hip flexion is normal. Right hip extension is normal. Right Hip abduction is normal. There is no pain with forced abduction. Right hip adduction is normal. Right hip internal rotation is normal. Right hip external rotation is normal. There is no pain with straight leg raising on the right.    Impression and Assessment    Essential Hypertension.    Gastroesophageal Reflux Disease.    Right Hip Pain.    Plan    Gastroesophageal Reflux Disease Plan: The patient was instructed to continue the current medications.    Essential Hypertension Plan: The patient was instructed to continue the current medications.    Right Hip Pain Plan: Medication will be added as noted. She will call with an update in one week. If not improving, will consider MRI and /or PT.    Diagnoses and all orders for this visit:    1. Essential hypertension (Primary)  -     Comprehensive Metabolic Panel; Future    2. Gastroesophageal reflux disease without  esophagitis    3. Right hip pain  -     XR Hip With or Without Pelvis 2 - 3 View Right; Future  -     methylPREDNISolone (MEDROL) 4 MG dose pack; Take as directed on package instructions.  Dispense: 21 tablet; Refill: 0        Return to Office    The patient was instructed to return for follow-up in 6 months. The patient was instructed to return sooner if the condition changes, worsens, or does not resolve.

## 2021-12-08 LAB
ALBUMIN SERPL-MCNC: 4.1 G/DL (ref 3.5–5.2)
ALBUMIN/GLOB SERPL: 1.5 G/DL
ALP SERPL-CCNC: 47 U/L (ref 39–117)
ALT SERPL W P-5'-P-CCNC: 13 U/L (ref 1–33)
ANION GAP SERPL CALCULATED.3IONS-SCNC: 12.4 MMOL/L (ref 5–15)
AST SERPL-CCNC: 21 U/L (ref 1–32)
BILIRUB SERPL-MCNC: 0.2 MG/DL (ref 0–1.2)
BUN SERPL-MCNC: 13 MG/DL (ref 8–23)
BUN/CREAT SERPL: 19.1 (ref 7–25)
CALCIUM SPEC-SCNC: 8.9 MG/DL (ref 8.6–10.5)
CHLORIDE SERPL-SCNC: 98 MMOL/L (ref 98–107)
CO2 SERPL-SCNC: 25.6 MMOL/L (ref 22–29)
CREAT SERPL-MCNC: 0.68 MG/DL (ref 0.57–1)
GFR SERPL CREATININE-BSD FRML MDRD: 84 ML/MIN/1.73
GLOBULIN UR ELPH-MCNC: 2.8 GM/DL
GLUCOSE SERPL-MCNC: 87 MG/DL (ref 65–99)
POTASSIUM SERPL-SCNC: 4 MMOL/L (ref 3.5–5.2)
PROT SERPL-MCNC: 6.9 G/DL (ref 6–8.5)
SODIUM SERPL-SCNC: 136 MMOL/L (ref 136–145)

## 2021-12-14 DIAGNOSIS — G89.29 CHRONIC LOW BACK PAIN WITHOUT SCIATICA, UNSPECIFIED BACK PAIN LATERALITY: ICD-10-CM

## 2021-12-14 DIAGNOSIS — M54.50 CHRONIC LOW BACK PAIN WITHOUT SCIATICA, UNSPECIFIED BACK PAIN LATERALITY: ICD-10-CM

## 2021-12-14 RX ORDER — TRAMADOL HYDROCHLORIDE 50 MG/1
50 TABLET ORAL EVERY 6 HOURS PRN
Qty: 120 TABLET | Refills: 0 | Status: SHIPPED | OUTPATIENT
Start: 2021-12-14 | End: 2022-01-21 | Stop reason: SDUPTHER

## 2021-12-16 ENCOUNTER — TELEPHONE (OUTPATIENT)
Dept: INTERNAL MEDICINE | Facility: CLINIC | Age: 76
End: 2021-12-16

## 2021-12-16 NOTE — TELEPHONE ENCOUNTER
THE PATIENT STATES THAT DOCTOR HEATHER WANTED HER TO CALL TO LET HIM KNOW IF THE MEDICATION THAT HSE WAS GIVEN FOR HER RIGHT HIP WORKED THE PATIENT STATES THAT THE MEDICATION DID WORK AND THAT SHE FEELS MUCH BETTER THE PATIENT STATES THAT SHE STILL GETS AN OCCASIONAL PAIN IN HER HIP BUT THE PAIN IS NOTHING LIKE IT WAS       CALL [phone]

## 2022-01-21 DIAGNOSIS — M54.50 CHRONIC LOW BACK PAIN WITHOUT SCIATICA, UNSPECIFIED BACK PAIN LATERALITY: ICD-10-CM

## 2022-01-21 DIAGNOSIS — G89.29 CHRONIC LOW BACK PAIN WITHOUT SCIATICA, UNSPECIFIED BACK PAIN LATERALITY: ICD-10-CM

## 2022-01-24 RX ORDER — TRAMADOL HYDROCHLORIDE 50 MG/1
50 TABLET ORAL EVERY 6 HOURS PRN
Qty: 120 TABLET | Refills: 0 | Status: SHIPPED | OUTPATIENT
Start: 2022-01-24 | End: 2022-03-15 | Stop reason: SDUPTHER

## 2022-01-27 ENCOUNTER — TRANSCRIBE ORDERS (OUTPATIENT)
Dept: ADMINISTRATIVE | Facility: HOSPITAL | Age: 77
End: 2022-01-27

## 2022-01-27 DIAGNOSIS — Z12.31 VISIT FOR SCREENING MAMMOGRAM: Primary | ICD-10-CM

## 2022-02-07 ENCOUNTER — LAB (OUTPATIENT)
Dept: LAB | Facility: HOSPITAL | Age: 77
End: 2022-02-07

## 2022-02-07 ENCOUNTER — OFFICE VISIT (OUTPATIENT)
Dept: INTERNAL MEDICINE | Facility: CLINIC | Age: 77
End: 2022-02-07

## 2022-02-07 VITALS
TEMPERATURE: 98.2 F | BODY MASS INDEX: 24.14 KG/M2 | RESPIRATION RATE: 16 BRPM | HEART RATE: 76 BPM | DIASTOLIC BLOOD PRESSURE: 64 MMHG | WEIGHT: 132 LBS | SYSTOLIC BLOOD PRESSURE: 128 MMHG

## 2022-02-07 DIAGNOSIS — K21.9 GASTROESOPHAGEAL REFLUX DISEASE WITHOUT ESOPHAGITIS: ICD-10-CM

## 2022-02-07 DIAGNOSIS — I10 ESSENTIAL HYPERTENSION: Primary | ICD-10-CM

## 2022-02-07 PROCEDURE — 36415 COLL VENOUS BLD VENIPUNCTURE: CPT | Performed by: INTERNAL MEDICINE

## 2022-02-07 PROCEDURE — 80053 COMPREHEN METABOLIC PANEL: CPT | Performed by: INTERNAL MEDICINE

## 2022-02-07 PROCEDURE — 99214 OFFICE O/P EST MOD 30 MIN: CPT | Performed by: INTERNAL MEDICINE

## 2022-02-08 LAB
ALBUMIN SERPL-MCNC: 4.1 G/DL (ref 3.5–5.2)
ALBUMIN/GLOB SERPL: 1.5 G/DL
ALP SERPL-CCNC: 48 U/L (ref 39–117)
ALT SERPL W P-5'-P-CCNC: 12 U/L (ref 1–33)
ANION GAP SERPL CALCULATED.3IONS-SCNC: 8.9 MMOL/L (ref 5–15)
AST SERPL-CCNC: 18 U/L (ref 1–32)
BILIRUB SERPL-MCNC: 0.3 MG/DL (ref 0–1.2)
BUN SERPL-MCNC: 15 MG/DL (ref 8–23)
BUN/CREAT SERPL: 19 (ref 7–25)
CALCIUM SPEC-SCNC: 9 MG/DL (ref 8.6–10.5)
CHLORIDE SERPL-SCNC: 97 MMOL/L (ref 98–107)
CO2 SERPL-SCNC: 28.1 MMOL/L (ref 22–29)
CREAT SERPL-MCNC: 0.79 MG/DL (ref 0.57–1)
GFR SERPL CREATININE-BSD FRML MDRD: 71 ML/MIN/1.73
GLOBULIN UR ELPH-MCNC: 2.8 GM/DL
GLUCOSE SERPL-MCNC: 87 MG/DL (ref 65–99)
POTASSIUM SERPL-SCNC: 4.1 MMOL/L (ref 3.5–5.2)
PROT SERPL-MCNC: 6.9 G/DL (ref 6–8.5)
SODIUM SERPL-SCNC: 134 MMOL/L (ref 136–145)

## 2022-02-08 NOTE — PROGRESS NOTES
Chief Complaint   Patient presents with   • Follow-up     6 month follow up chronic medical problems       History of Present Illness    The patient presents for a follow-up related to hypertension. The patient reports that she has had no headaches, chest pain, dyspnea, edema, syncope, blurred vision or palpitations. She states that she is taking her medication as prescribed. She is not having medication side effects.    The patient presents for a follow-up related to GERD. The patient is on pantoprazole for her gastroesophageal reflux. The medication is taken on a regular basis and gives complete relief of the symptoms. She reports no abdominal pain, belching, diarrhea, dysphagia, early satiety, heartburn, hoarseness, nausea, odynophagia, rectal bleeding, vomiting or weight loss. The GERD has no known aggravating factors.    Review of Systems    CONSTITUTIONAL- Denies Fever, Chills, Sweats, Fatigue, Weakness or Malaise.    GASTROINTESTINAL- Denies Constipation.    PULMONARY- Denies Wheezing, Sputum Production, Cough, Hemoptysis or Pleuritic Chest Pain.    CARDIOVASCULAR- Denies Claudication or Irregular Heart Beat.    Medications      Current Outpatient Medications:   •  acetaminophen (TYLENOL) 650 MG 8 hr tablet, Take 1,300 mg by mouth Every 8 (Eight) Hours As Needed for Mild Pain ., Disp: , Rfl:   •  AMITIZA 24 MCG capsule, Take 1 capsule by mouth 2 (Two) Times a Day., Disp: , Rfl:   •  B Complex Vitamins (VITAMIN B COMPLEX PO), Take 1 tablet by mouth Daily., Disp: , Rfl:   •  Calcium-Magnesium-Vitamin D (CALCIUM MAGNESIUM PO), Take  by mouth., Disp: , Rfl:   •  custom compounded cream, Apply  topically. CUSTOM COMPOUNDED RECTAL SUPPOSITORIES BELLADONNA/MORPHINE SULF 16.2/5MG  INSERT 1 SUPPOSITORY TWICE DAILY AS NEEDED, Disp: , Rfl:   •  custom compounded cream, Apply  topically. CUSTOM COMPOUNDED RECTAL SUPPOSITORY HYDROCORTISONE/LIDOCAINE 2.5% / 2% INSERT 1 SUPPOSITORY TWICE DAILY AS NEEDED, Disp: , Rfl:   •   DHA-EPA-Vitamin E (OMEGA-3 COMPLEX PO), Take 1 capsule by mouth Daily., Disp: , Rfl:   •  diazePAM (DIASTAT) 2.5 MG gel, Insert 5 mg into the rectum At Night As Needed., Disp: , Rfl:   •  diclofenac (VOLTAREN) 1 % gel gel, Apply four grams topically to affected areas four times a day as needed for pain., Disp: 100 g, Rfl: 1  •  Dietary Management Product (VASCULERA PO), Take 1 tablet by mouth Daily., Disp: , Rfl:   •  Diphenhydramine-APAP, sleep, (TYLENOL PM EXTRA STRENGTH PO), Take 2 tablets by mouth At Night As Needed., Disp: , Rfl:   •  doxazosin (CARDURA) 2 MG tablet, TAKE ONE TABLET BY MOUTH EVERY EVENING, Disp: 90 tablet, Rfl: 1  •  famotidine (PEPCID) 20 MG tablet, Take 20 mg by mouth 2 (Two) Times a Day As Needed., Disp: , Rfl:   •  fluticasone (FLONASE) 50 MCG/ACT nasal spray, 2 sprays into the nostril(s) as directed by provider Daily., Disp: 16 g, Rfl: 3  •  gabapentin (NEURONTIN) 100 MG capsule, TAKE 3 CAPSULES AT NOON AND 5 CAPSULES AT HS, Disp: , Rfl:   •  ketoconazole (NIZORAL) 2 % cream, , Disp: , Rfl:   •  Lidocaine, Anorectal, (RECTICARE) 5 % cream cream, Apply  topically 4 (Four) Times a Day As Needed., Disp: , Rfl:   •  loratadine-pseudoephedrine (Claritin-D 12 Hour) 5-120 MG per 12 hr tablet, Take 1 tablet by mouth 2 (Two) Times a Day As Needed for Allergies., Disp: 60 tablet, Rfl: 2  •  MAGNESIUM GLUCONATE PO, Take 350 mg of amoxicillin by mouth Daily., Disp: , Rfl:   •  meclizine (ANTIVERT) 12.5 MG tablet, Take 1 tablet by mouth Every 6 (Six) Hours As Needed., Disp: , Rfl:   •  montelukast (SINGULAIR) 10 MG tablet, Take 1 tablet by mouth Daily., Disp: 90 tablet, Rfl: 1  •  pantoprazole (PROTONIX) 40 MG EC tablet, Take 1 tablet by mouth Daily., Disp: 90 tablet, Rfl: 1  •  polyethylene glycol (MIRALAX) powder, Take 17 g by mouth Daily As Needed (constipation)., Disp: , Rfl:   •  Probiotic Product (PROBIOTIC DAILY PO), Take 1 tablet by mouth Daily., Disp: , Rfl:   •  raloxifene (EVISTA) 60 MG  tablet, TAKE ONE TABLET BY MOUTH DAILY, Disp: 90 tablet, Rfl: 1  •  Simethicone (GAS-X PO), Take  by mouth., Disp: , Rfl:   •  traMADol (ULTRAM) 50 MG tablet, Take 1 tablet by mouth Every 6 (Six) Hours As Needed for Moderate Pain ., Disp: 120 tablet, Rfl: 0  •  triamterene-hydrochlorothiazide (DYAZIDE) 37.5-25 MG per capsule, Take 1 capsule by mouth Daily., Disp: , Rfl:   •  VITAMIN D, CHOLECALCIFEROL, PO, Take  by mouth., Disp: , Rfl:   •  Wheat Dextrin (BENEFIBER DRINK MIX PO), Take  by mouth., Disp: , Rfl:      Allergies    Allergies   Allergen Reactions   • Motrin [Ibuprofen] Nausea Only       Problem List    Patient Active Problem List   Diagnosis   • Rectal pain   • S/P hemorrhoidectomy 11/1/16   • Urinary retention   • Acute urinary retention   • Chronic low back pain   • Osteopenia of neck of femur       Medications, Allergies, Problems List and Past History were reviewed and updated.    Physical Examination    /64 (BP Location: Left arm, Patient Position: Sitting, Cuff Size: Adult)   Pulse 76   Temp 98.2 °F (36.8 °C) (Infrared)   Resp 16   Wt 59.9 kg (132 lb)   LMP  (LMP Unknown) Comment: LAST PAP 2015  Breastfeeding No   BMI 24.14 kg/m²     HEENT: Facies- Within normal limits. Lids- Normal bilaterally. Conjunctiva- Clear bilaterally. Sclera- Anicteric bilaterally.    Neck: Thyroid- non enlarged, symmetric and has no nodules. No bruits are detected.    Lungs: Auscultation- Clear to auscultation bilaterally. There are no retractions, clubbing or cyanosis. The Expiratory to Inspiratory ratio is equal.    Cardiovascular: There are no carotid bruits. Heart- Normal Rate with Regular rhythm and no murmurs. There are no gallops. There are no rubs. In the lower extremities there is no edema. The upper extremities do not have edema.    Abdomen: Soft, benign, non-tender with no masses, hernias, organomegaly or scars.    Impression and Assessment    Essential Hypertension.    Gastroesophageal Reflux  Disease.    Plan    Gastroesophageal Reflux Disease Plan: The patient was instructed to continue the current medications.    Essential Hypertension Plan: The patient was instructed to continue the current medications.    Diagnoses and all orders for this visit:    1. Essential hypertension (Primary)  -     Comprehensive Metabolic Panel; Future  -     Comprehensive Metabolic Panel    2. Gastroesophageal reflux disease without esophagitis        Return to Office    The patient was instructed to return for follow-up in 4 months. Next Visit: Extended Follow-up and Subsequent Medicare Annual Wellness Visit. The patient was instructed to return sooner if the condition changes, worsens, or does not resolve.

## 2022-02-15 RX ORDER — MONTELUKAST SODIUM 10 MG/1
10 TABLET ORAL DAILY
Qty: 90 TABLET | Refills: 1 | Status: SHIPPED | OUTPATIENT
Start: 2022-02-15 | End: 2022-08-11 | Stop reason: SDUPTHER

## 2022-03-15 DIAGNOSIS — G89.29 CHRONIC LOW BACK PAIN WITHOUT SCIATICA, UNSPECIFIED BACK PAIN LATERALITY: ICD-10-CM

## 2022-03-15 DIAGNOSIS — M54.50 CHRONIC LOW BACK PAIN WITHOUT SCIATICA, UNSPECIFIED BACK PAIN LATERALITY: ICD-10-CM

## 2022-03-16 RX ORDER — TRAMADOL HYDROCHLORIDE 50 MG/1
50 TABLET ORAL EVERY 6 HOURS PRN
Qty: 120 TABLET | Refills: 0 | Status: SHIPPED | OUTPATIENT
Start: 2022-03-16 | End: 2022-04-25 | Stop reason: SDUPTHER

## 2022-03-29 ENCOUNTER — HOSPITAL ENCOUNTER (OUTPATIENT)
Dept: MAMMOGRAPHY | Facility: HOSPITAL | Age: 77
Discharge: HOME | End: 2022-03-29

## 2022-03-29 DIAGNOSIS — Z12.31 VISIT FOR SCREENING MAMMOGRAM: ICD-10-CM

## 2022-03-31 ENCOUNTER — TELEPHONE (OUTPATIENT)
Dept: INTERNAL MEDICINE | Facility: CLINIC | Age: 77
End: 2022-03-31

## 2022-04-03 NOTE — TELEPHONE ENCOUNTER
Please schedule appt with me to evaluate this abnormality prior to ordering diagnostic mammogram.  Abram Myers MD  19:40 EDT  04/03/22

## 2022-04-11 ENCOUNTER — OFFICE VISIT (OUTPATIENT)
Dept: INTERNAL MEDICINE | Facility: CLINIC | Age: 77
End: 2022-04-11

## 2022-04-11 VITALS
TEMPERATURE: 98 F | HEART RATE: 72 BPM | RESPIRATION RATE: 18 BRPM | SYSTOLIC BLOOD PRESSURE: 98 MMHG | BODY MASS INDEX: 24.51 KG/M2 | WEIGHT: 134 LBS | DIASTOLIC BLOOD PRESSURE: 54 MMHG

## 2022-04-11 DIAGNOSIS — N64.59 BREAST THICKENING: Primary | ICD-10-CM

## 2022-04-11 PROCEDURE — 99213 OFFICE O/P EST LOW 20 MIN: CPT | Performed by: INTERNAL MEDICINE

## 2022-04-11 RX ORDER — GABAPENTIN 300 MG/1
1 CAPSULE ORAL
COMMUNITY
Start: 2022-02-24

## 2022-04-11 RX ORDER — RALOXIFENE HYDROCHLORIDE 60 MG/1
60 TABLET, FILM COATED ORAL DAILY
Qty: 90 TABLET | Refills: 1 | Status: SHIPPED | OUTPATIENT
Start: 2022-04-11 | End: 2022-10-03 | Stop reason: SDUPTHER

## 2022-04-11 NOTE — PROGRESS NOTES
"Chief Complaint   Patient presents with   • feels thickening on left breast       History of Present Illness    She has noted thickening on the lateral aspect of the left breast for 6 months.  No axillary lumps or pain.  She states her breasts are usually \"lumpy\".  No family history of breast cancer.  She went for a screening mammogram and was told to get a diagnostic mammogram.  She has not noticed skin changes or nipple discharge.  No excessive caffeine.  No THC.    Review of Systems    CONSTITUTIONAL- Denies Unexplained Weight Loss, Fever, Chills, Sweats, Fatigue, Weakness or Malaise.    Medications      Current Outpatient Medications:   •  acetaminophen (TYLENOL) 650 MG 8 hr tablet, Take 1,300 mg by mouth Every 8 (Eight) Hours As Needed for Mild Pain ., Disp: , Rfl:   •  AMITIZA 24 MCG capsule, Take 1 capsule by mouth 2 (Two) Times a Day., Disp: , Rfl:   •  B Complex Vitamins (VITAMIN B COMPLEX PO), Take 1 tablet by mouth Daily., Disp: , Rfl:   •  Calcium-Magnesium-Vitamin D (CALCIUM MAGNESIUM PO), Take  by mouth., Disp: , Rfl:   •  custom compounded cream, Apply  topically to the appropriate area as directed. CUSTOM COMPOUNDED RECTAL SUPPOSITORIES BELLADONNA/MORPHINE SULF 16.2/5MG  INSERT 1 SUPPOSITORY TWICE DAILY AS NEEDED, Disp: , Rfl:   •  custom compounded cream, Apply  topically to the appropriate area as directed. CUSTOM COMPOUNDED RECTAL SUPPOSITORY HYDROCORTISONE/LIDOCAINE 2.5% / 2% INSERT 1 SUPPOSITORY TWICE DAILY AS NEEDED, Disp: , Rfl:   •  DHA-EPA-Vitamin E (OMEGA-3 COMPLEX PO), Take 1 capsule by mouth Daily., Disp: , Rfl:   •  diazePAM (DIASTAT) 2.5 MG gel, Insert 5 mg into the rectum At Night As Needed., Disp: , Rfl:   •  diclofenac (VOLTAREN) 1 % gel gel, Apply four grams topically to affected areas four times a day as needed for pain., Disp: 100 g, Rfl: 1  •  Dietary Management Product (VASCULERA PO), Take 1 tablet by mouth Daily., Disp: , Rfl:   •  Diphenhydramine-APAP, sleep, (TYLENOL PM EXTRA " STRENGTH PO), Take 2 tablets by mouth At Night As Needed., Disp: , Rfl:   •  famotidine (PEPCID) 20 MG tablet, Take 20 mg by mouth 2 (Two) Times a Day As Needed., Disp: , Rfl:   •  fluticasone (FLONASE) 50 MCG/ACT nasal spray, 2 sprays into the nostril(s) as directed by provider Daily., Disp: 16 g, Rfl: 3  •  gabapentin (NEURONTIN) 300 MG capsule, Take 1 capsule by mouth 4 (Four) Times a Day., Disp: , Rfl:   •  Lidocaine, Anorectal, (LMX 5) 5 % cream cream, Apply  topically 4 (Four) Times a Day As Needed., Disp: , Rfl:   •  loratadine-pseudoephedrine (Claritin-D 12 Hour) 5-120 MG per 12 hr tablet, Take 1 tablet by mouth 2 (Two) Times a Day As Needed for Allergies., Disp: 60 tablet, Rfl: 2  •  MAGNESIUM GLUCONATE PO, Take 350 mg of amoxicillin by mouth Daily., Disp: , Rfl:   •  meclizine (ANTIVERT) 12.5 MG tablet, Take 1 tablet by mouth Every 6 (Six) Hours As Needed., Disp: , Rfl:   •  montelukast (SINGULAIR) 10 MG tablet, Take 1 tablet by mouth Daily., Disp: 90 tablet, Rfl: 1  •  pantoprazole (PROTONIX) 40 MG EC tablet, Take 1 tablet by mouth Daily., Disp: 90 tablet, Rfl: 1  •  polyethylene glycol (MIRALAX) powder, Take 17 g by mouth Daily As Needed (constipation)., Disp: , Rfl:   •  Probiotic Product (PROBIOTIC DAILY PO), Take 1 tablet by mouth Daily., Disp: , Rfl:   •  raloxifene (EVISTA) 60 MG tablet, TAKE ONE TABLET BY MOUTH DAILY, Disp: 90 tablet, Rfl: 1  •  Simethicone (GAS-X PO), Take  by mouth., Disp: , Rfl:   •  traMADol (ULTRAM) 50 MG tablet, Take 1 tablet by mouth Every 6 (Six) Hours As Needed for Moderate Pain ., Disp: 120 tablet, Rfl: 0  •  triamterene-hydrochlorothiazide (DYAZIDE) 37.5-25 MG per capsule, Take 1 capsule by mouth Daily., Disp: , Rfl:   •  VITAMIN D, CHOLECALCIFEROL, PO, Take  by mouth., Disp: , Rfl:   •  Wheat Dextrin (BENEFIBER DRINK MIX PO), Take  by mouth., Disp: , Rfl:      Allergies    Allergies   Allergen Reactions   • Motrin [Ibuprofen] Nausea Only       Problem List    Patient  Active Problem List   Diagnosis   • Rectal pain   • S/P hemorrhoidectomy 11/1/16   • Urinary retention   • Acute urinary retention   • Chronic low back pain   • Osteopenia of neck of femur       Medications, Allergies, Problems List and Past History were reviewed and updated.    Physical Examination    BP 98/54 (BP Location: Right arm, Patient Position: Sitting, Cuff Size: Adult)   Pulse 72   Temp 98 °F (36.7 °C) (Infrared)   Resp 18   Wt 60.8 kg (134 lb)   LMP  (LMP Unknown) Comment: LAST PAP 2015  Breastfeeding No   BMI 24.51 kg/m²       Lymph Nodes: Cervical- no enlarged lymph nodes noted. Clavicular- no enlarged supraclavicular lymph nodes noted. Axillary- no enlarged axillary lymph nodes noted.    Breast: Examination reveals that the right breast has normal contours with no masses, discharge, skin changes, or lumps, the left breast has normal contours with no masses, discharge, skin changes, or lumps and There is ropey thickening on the lateral aspect of the left breast. No discrete mass.. There are no scars noted.    Diagnoses and all orders for this visit:    1. Breast thickening (Primary)  -     Mammo Diagnostic Digital Tomosynthesis Bilateral With CAD; Future

## 2022-04-25 DIAGNOSIS — M54.50 CHRONIC LOW BACK PAIN WITHOUT SCIATICA, UNSPECIFIED BACK PAIN LATERALITY: ICD-10-CM

## 2022-04-25 DIAGNOSIS — G89.29 CHRONIC LOW BACK PAIN WITHOUT SCIATICA, UNSPECIFIED BACK PAIN LATERALITY: ICD-10-CM

## 2022-04-25 RX ORDER — TRAMADOL HYDROCHLORIDE 50 MG/1
50 TABLET ORAL EVERY 6 HOURS PRN
Qty: 120 TABLET | Refills: 0 | Status: SHIPPED | OUTPATIENT
Start: 2022-04-25 | End: 2022-05-30 | Stop reason: SDUPTHER

## 2022-05-02 RX ORDER — PANTOPRAZOLE SODIUM 40 MG/1
40 TABLET, DELAYED RELEASE ORAL DAILY
Qty: 90 TABLET | Refills: 1 | Status: SHIPPED | OUTPATIENT
Start: 2022-05-02 | End: 2022-10-25

## 2022-05-20 ENCOUNTER — HOSPITAL ENCOUNTER (OUTPATIENT)
Dept: ULTRASOUND IMAGING | Facility: HOSPITAL | Age: 77
Discharge: HOME | End: 2022-05-20

## 2022-05-20 ENCOUNTER — HOSPITAL ENCOUNTER (OUTPATIENT)
Dept: MAMMOGRAPHY | Facility: HOSPITAL | Age: 77
Discharge: HOME | End: 2022-05-20

## 2022-05-20 DIAGNOSIS — N64.59 BREAST THICKENING: ICD-10-CM

## 2022-05-20 PROCEDURE — G0279 TOMOSYNTHESIS, MAMMO: HCPCS | Performed by: RADIOLOGY

## 2022-05-20 PROCEDURE — 76642 ULTRASOUND BREAST LIMITED: CPT | Performed by: RADIOLOGY

## 2022-05-20 PROCEDURE — 77066 DX MAMMO INCL CAD BI: CPT

## 2022-05-20 PROCEDURE — 77066 DX MAMMO INCL CAD BI: CPT | Performed by: RADIOLOGY

## 2022-05-20 PROCEDURE — 76642 ULTRASOUND BREAST LIMITED: CPT

## 2022-05-20 PROCEDURE — G0279 TOMOSYNTHESIS, MAMMO: HCPCS

## 2022-05-30 DIAGNOSIS — M54.50 CHRONIC LOW BACK PAIN WITHOUT SCIATICA, UNSPECIFIED BACK PAIN LATERALITY: ICD-10-CM

## 2022-05-30 DIAGNOSIS — G89.29 CHRONIC LOW BACK PAIN WITHOUT SCIATICA, UNSPECIFIED BACK PAIN LATERALITY: ICD-10-CM

## 2022-05-31 RX ORDER — TRAMADOL HYDROCHLORIDE 50 MG/1
50 TABLET ORAL EVERY 6 HOURS PRN
Qty: 120 TABLET | Refills: 0 | Status: SHIPPED | OUTPATIENT
Start: 2022-05-31 | End: 2022-06-27 | Stop reason: SDUPTHER

## 2022-06-08 ENCOUNTER — OFFICE VISIT (OUTPATIENT)
Dept: INTERNAL MEDICINE | Facility: CLINIC | Age: 77
End: 2022-06-08

## 2022-06-08 VITALS
BODY MASS INDEX: 25.26 KG/M2 | RESPIRATION RATE: 16 BRPM | DIASTOLIC BLOOD PRESSURE: 70 MMHG | SYSTOLIC BLOOD PRESSURE: 126 MMHG | HEIGHT: 61 IN | WEIGHT: 133.8 LBS | TEMPERATURE: 98 F | HEART RATE: 74 BPM

## 2022-06-08 DIAGNOSIS — Z00.00 MEDICARE ANNUAL WELLNESS VISIT, SUBSEQUENT: Primary | ICD-10-CM

## 2022-06-08 DIAGNOSIS — M54.50 CHRONIC MIDLINE LOW BACK PAIN WITHOUT SCIATICA: ICD-10-CM

## 2022-06-08 DIAGNOSIS — K21.9 GASTROESOPHAGEAL REFLUX DISEASE WITHOUT ESOPHAGITIS: ICD-10-CM

## 2022-06-08 DIAGNOSIS — G89.29 CHRONIC MIDLINE LOW BACK PAIN WITHOUT SCIATICA: ICD-10-CM

## 2022-06-08 DIAGNOSIS — I10 ESSENTIAL HYPERTENSION: ICD-10-CM

## 2022-06-08 LAB
BASOPHILS # BLD AUTO: 0.04 10*3/MM3 (ref 0–0.2)
BASOPHILS NFR BLD AUTO: 0.7 % (ref 0–1.5)
BILIRUB BLD-MCNC: NEGATIVE MG/DL
CHOLEST SERPL-MCNC: 225 MG/DL (ref 0–200)
CLARITY, POC: CLEAR
COLOR UR: YELLOW
DEPRECATED RDW RBC AUTO: 41.4 FL (ref 37–54)
EOSINOPHIL # BLD AUTO: 0.07 10*3/MM3 (ref 0–0.4)
EOSINOPHIL NFR BLD AUTO: 1.1 % (ref 0.3–6.2)
ERYTHROCYTE [DISTWIDTH] IN BLOOD BY AUTOMATED COUNT: 12.8 % (ref 12.3–15.4)
EXPIRATION DATE: NORMAL
GLUCOSE UR STRIP-MCNC: NEGATIVE MG/DL
HCT VFR BLD AUTO: 33.3 % (ref 34–46.6)
HDLC SERPL-MCNC: 80 MG/DL (ref 40–60)
HGB BLD-MCNC: 11.2 G/DL (ref 12–15.9)
IMM GRANULOCYTES # BLD AUTO: 0.01 10*3/MM3 (ref 0–0.05)
IMM GRANULOCYTES NFR BLD AUTO: 0.2 % (ref 0–0.5)
KETONES UR QL: NEGATIVE
LDLC SERPL CALC-MCNC: 127 MG/DL (ref 0–100)
LDLC/HDLC SERPL: 1.56 {RATIO}
LEUKOCYTE EST, POC: NEGATIVE
LYMPHOCYTES # BLD AUTO: 2.09 10*3/MM3 (ref 0.7–3.1)
LYMPHOCYTES NFR BLD AUTO: 34.3 % (ref 19.6–45.3)
Lab: NORMAL
MCH RBC QN AUTO: 30.3 PG (ref 26.6–33)
MCHC RBC AUTO-ENTMCNC: 33.6 G/DL (ref 31.5–35.7)
MCV RBC AUTO: 90 FL (ref 79–97)
MONOCYTES # BLD AUTO: 0.58 10*3/MM3 (ref 0.1–0.9)
MONOCYTES NFR BLD AUTO: 9.5 % (ref 5–12)
NEUTROPHILS NFR BLD AUTO: 3.3 10*3/MM3 (ref 1.7–7)
NEUTROPHILS NFR BLD AUTO: 54.2 % (ref 42.7–76)
NITRITE UR-MCNC: NEGATIVE MG/ML
NRBC BLD AUTO-RTO: 0 /100 WBC (ref 0–0.2)
PH UR: 8 [PH] (ref 5–8)
PLATELET # BLD AUTO: 273 10*3/MM3 (ref 140–450)
PMV BLD AUTO: 9.2 FL (ref 6–12)
PROT UR STRIP-MCNC: NEGATIVE MG/DL
RBC # BLD AUTO: 3.7 10*6/MM3 (ref 3.77–5.28)
RBC # UR STRIP: NEGATIVE /UL
SP GR UR: 1.01 (ref 1–1.03)
TRIGL SERPL-MCNC: 102 MG/DL (ref 0–150)
TSH SERPL DL<=0.05 MIU/L-ACNC: 2.56 UIU/ML (ref 0.27–4.2)
UROBILINOGEN UR QL: NORMAL
VLDLC SERPL-MCNC: 18 MG/DL (ref 5–40)
WBC NRBC COR # BLD: 6.09 10*3/MM3 (ref 3.4–10.8)

## 2022-06-08 PROCEDURE — 1170F FXNL STATUS ASSESSED: CPT | Performed by: INTERNAL MEDICINE

## 2022-06-08 PROCEDURE — G0439 PPPS, SUBSEQ VISIT: HCPCS | Performed by: INTERNAL MEDICINE

## 2022-06-08 PROCEDURE — 80061 LIPID PANEL: CPT | Performed by: INTERNAL MEDICINE

## 2022-06-08 PROCEDURE — 1159F MED LIST DOCD IN RCRD: CPT | Performed by: INTERNAL MEDICINE

## 2022-06-08 PROCEDURE — 84443 ASSAY THYROID STIM HORMONE: CPT | Performed by: INTERNAL MEDICINE

## 2022-06-08 PROCEDURE — 81003 URINALYSIS AUTO W/O SCOPE: CPT | Performed by: INTERNAL MEDICINE

## 2022-06-08 PROCEDURE — 85025 COMPLETE CBC W/AUTO DIFF WBC: CPT | Performed by: INTERNAL MEDICINE

## 2022-06-08 PROCEDURE — 80053 COMPREHEN METABOLIC PANEL: CPT | Performed by: INTERNAL MEDICINE

## 2022-06-08 NOTE — PROGRESS NOTES
Chief Complaint   Patient presents with   • Medicare Wellness-subsequent       History of Present Illness      The patient presents for a follow-up related to hypertension. The patient reports that she has had no headaches, chest pain, dyspnea, edema, syncope, blurred vision or palpitations. She states that she is taking her medication as prescribed. She is not having medication side effects.    The patient presents for a follow-up related to GERD. The patient is on famotidine and pantoprazole for her gastroesophageal reflux. The medication is taken on a regular basis and gives complete relief of the symptoms. She reports no abdominal pain, belching, diarrhea, dysphagia, early satiety, heartburn, hoarseness, nausea, odynophagia, rectal bleeding, vomiting or weight loss. The GERD has no known aggravating factors.    Review of Systems    CONSTITUTIONAL- Denies Fever, Chills, Sweats, Fatigue, Weakness or Malaise.    HENT- Denies Nasal Discharge, Sore Throat, Ear Pain, Ear Drainage, Sinus Pain, Nasal Congestion, Decreased Hearing or Tinnitus.    GASTROINTESTINAL- Denies Constipation.    PULMONARY- Denies Wheezing, Sputum Production, Cough, Hemoptysis or Pleuritic Chest Pain.    CARDIOVASCULAR- Denies Claudication or Irregular Heart Beat.    Medications      Current Outpatient Medications:   •  acetaminophen (TYLENOL) 650 MG 8 hr tablet, Take 1,300 mg by mouth Every 8 (Eight) Hours As Needed for Mild Pain ., Disp: , Rfl:   •  AMITIZA 24 MCG capsule, Take 1 capsule by mouth 2 (Two) Times a Day., Disp: , Rfl:   •  B Complex Vitamins (VITAMIN B COMPLEX PO), Take 1 tablet by mouth Daily., Disp: , Rfl:   •  Calcium-Magnesium-Vitamin D (CALCIUM MAGNESIUM PO), Take  by mouth., Disp: , Rfl:   •  custom compounded cream, Apply  topically to the appropriate area as directed. CUSTOM COMPOUNDED RECTAL SUPPOSITORIES BELLADONNA/MORPHINE SULF 16.2/5MG  INSERT 1 SUPPOSITORY TWICE DAILY AS NEEDED, Disp: , Rfl:   •  custom compounded  cream, Apply  topically to the appropriate area as directed. CUSTOM COMPOUNDED RECTAL SUPPOSITORY HYDROCORTISONE/LIDOCAINE 2.5% / 2% INSERT 1 SUPPOSITORY TWICE DAILY AS NEEDED, Disp: , Rfl:   •  DHA-EPA-Vitamin E (OMEGA-3 COMPLEX PO), Take 1 capsule by mouth Daily., Disp: , Rfl:   •  diazePAM (DIASTAT) 2.5 MG gel, Insert 5 mg into the rectum At Night As Needed., Disp: , Rfl:   •  diclofenac (VOLTAREN) 1 % gel gel, Apply four grams topically to affected areas four times a day as needed for pain., Disp: 100 g, Rfl: 1  •  Dietary Management Product (VASCULERA PO), Take 1 tablet by mouth Daily., Disp: , Rfl:   •  Diphenhydramine-APAP, sleep, (TYLENOL PM EXTRA STRENGTH PO), Take 2 tablets by mouth At Night As Needed., Disp: , Rfl:   •  famotidine (PEPCID) 20 MG tablet, Take 20 mg by mouth 2 (Two) Times a Day As Needed., Disp: , Rfl:   •  fluticasone (FLONASE) 50 MCG/ACT nasal spray, 2 sprays into the nostril(s) as directed by provider Daily., Disp: 16 g, Rfl: 3  •  gabapentin (NEURONTIN) 300 MG capsule, Take 1 capsule by mouth 4 (Four) Times a Day., Disp: , Rfl:   •  Lidocaine, Anorectal, (LMX 5) 5 % cream cream, Apply  topically 4 (Four) Times a Day As Needed., Disp: , Rfl:   •  loratadine-pseudoephedrine (Claritin-D 12 Hour) 5-120 MG per 12 hr tablet, Take 1 tablet by mouth 2 (Two) Times a Day As Needed for Allergies., Disp: 60 tablet, Rfl: 2  •  meclizine (ANTIVERT) 12.5 MG tablet, Take 1 tablet by mouth Every 6 (Six) Hours As Needed., Disp: , Rfl:   •  montelukast (SINGULAIR) 10 MG tablet, Take 1 tablet by mouth Daily., Disp: 90 tablet, Rfl: 1  •  pantoprazole (PROTONIX) 40 MG EC tablet, Take 1 tablet by mouth Daily., Disp: 90 tablet, Rfl: 1  •  polyethylene glycol (MIRALAX) powder, Take 17 g by mouth Daily As Needed (constipation)., Disp: , Rfl:   •  Probiotic Product (PROBIOTIC DAILY PO), Take 1 tablet by mouth Daily., Disp: , Rfl:   •  raloxifene (EVISTA) 60 MG tablet, Take 1 tablet by mouth Daily., Disp: 90  "tablet, Rfl: 1  •  Simethicone (GAS-X PO), Take  by mouth., Disp: , Rfl:   •  traMADol (ULTRAM) 50 MG tablet, Take 1 tablet by mouth Every 6 (Six) Hours As Needed for Moderate Pain ., Disp: 120 tablet, Rfl: 0  •  triamterene-hydrochlorothiazide (DYAZIDE) 37.5-25 MG per capsule, Take 1 capsule by mouth Daily., Disp: , Rfl:   •  VITAMIN D, CHOLECALCIFEROL, PO, Take  by mouth., Disp: , Rfl:   •  Wheat Dextrin (BENEFIBER DRINK MIX PO), Take  by mouth., Disp: , Rfl:      Allergies    Allergies   Allergen Reactions   • Motrin [Ibuprofen] Nausea Only       Problem List    Patient Active Problem List   Diagnosis   • Rectal pain   • S/P hemorrhoidectomy 11/1/16   • Urinary retention   • Acute urinary retention   • Chronic low back pain   • Osteopenia of neck of femur       Medications, Allergies, Problems List and Past History were reviewed and updated.    Physical Examination    /70 (BP Location: Right arm, Patient Position: Sitting, Cuff Size: Adult)   Pulse 74   Temp 98 °F (36.7 °C) (Infrared)   Resp 16   Ht 153.7 cm (60.5\")   Wt 60.7 kg (133 lb 12.8 oz)   LMP  (LMP Unknown)   Breastfeeding No Comment: No routine GYN  BMI 25.70 kg/m²     HEENT: Head- Normocephalic Atraumatic. Facies- Within normal limits. Pinnas- Normal texture and shape bilaterally. Canals- Normal bilaterally. TMs- Normal bilaterally. Nares- Patent bilaterally. Nasal Septum- is normal. There is no tenderness to palpation over the frontal or maxillary sinuses. Lids- Normal bilaterally. Conjunctiva- Clear bilaterally. Sclera- Anicteric bilaterally. Oropharynx- Moist with no lesions. Tonsils- No enlargement, erythema or exudate.    Neck: Thyroid- non enlarged, symmetric and has no nodules. No bruits are detected. ROM- Normal Range of Motion with no rigidity.    Lungs: Auscultation- Clear to auscultation bilaterally. There are no retractions, clubbing or cyanosis. The Expiratory to Inspiratory ratio is equal.    Lymph Nodes: Cervical- no " enlarged lymph nodes noted. Clavicular- no enlarged supraclavicular lymph nodes noted. Axillary- no enlarged axillary lymph nodes noted. Inguinal- no enlarged inguinal lymph nodes noted.    Cardiovascular: There are no carotid bruits. Heart- Normal Rate with Regular rhythm and no murmurs. There are no gallops. There are no rubs. In the lower extremities there is no edema. The upper extremities do not have edema.    Abdomen: Soft, benign, non-tender with no masses, hernias, organomegaly or scars.    Breast: Normal contours bilaterally with no masses, discharge, skin changes or lumps. There are no scars noted.    Dermatologic: The patient has no worrisome or suspicious skin lesions noted.    Impression and Assessment    Essential Hypertension.    Gastroesophageal Reflux Disease.    Plan    Gastroesophageal Reflux Disease Plan: The patient was instructed to continue the current medications.    Essential Hypertension Plan: The patient was instructed to continue the current medications.    Diagnoses and all orders for this visit:    1. Medicare annual wellness visit, subsequent (Primary)    2. Essential hypertension  -     CBC & Differential; Future  -     Comprehensive Metabolic Panel; Future  -     Lipid Panel; Future  -     TSH; Future  -     POC Urinalysis Dipstick, Automated; Future    3. Gastroesophageal reflux disease without esophagitis        Return to Office    The patient was instructed to return for follow-up in 6 months. The patient was instructed to return sooner if the condition changes, worsens, or does not resolve.

## 2022-06-08 NOTE — PATIENT INSTRUCTIONS
Medicare Wellness  Personal Prevention Plan of Service     Date of Office Visit:    Encounter Provider:  Abram Myers MD  Place of Service:  [facility]  Patient Name: Brandi Duncan  :  1945    As part of the Medicare Wellness portion of your visit today, we are providing you with this personalized preventive plan of services (PPPS). This plan is based upon recommendations of the [place] Preventive Services Task Force (USPSTF) and the Advisory Committee on Immunization Practices (ACIP).    This lists the preventive care services that should be considered, and provides dates of when you are due. Items listed as completed are up-to-date and do not require any further intervention.    Health Maintenance   Topic Date Due    TDAP/TD VACCINES (1 - Tdap) Never done    INFLUENZA VACCINE  2022    DXA SCAN  2023    ANNUAL WELLNESS VISIT  2023    COLORECTAL CANCER SCREENING  10/26/2023    MAMMOGRAM  2024    HEPATITIS C SCREENING  Completed    COVID-19 Vaccine  Completed    Pneumococcal Vaccine 65+  Completed    ZOSTER VACCINE  Completed       Orders Placed This Encounter   Procedures    Comprehensive Metabolic Panel     Standing Status:   Future     Standing Expiration Date:   2023     Order Specific Question:   Release to patient     Answer:   Immediate    Lipid Panel     Standing Status:   Future     Standing Expiration Date:   2023    TSH     Standing Status:   Future     Standing Expiration Date:   2023     Order Specific Question:   Release to patient     Answer:   Immediate    POC Urinalysis Dipstick, Automated     Standing Status:   Future     Order Specific Question:   Release to patient     Answer:   Immediate    CBC & Differential     Standing Status:   Future     Standing Expiration Date:   2023     Order Specific Question:   Manual Differential     Answer:   No       Return in about 6 months (around  12/8/2022) for Follow-up.

## 2022-06-08 NOTE — PROGRESS NOTES
The ABCs of the Annual Wellness Visit  Subsequent Medicare Wellness Visit    Chief Complaint   Patient presents with   • Medicare Wellness-subsequent      Subjective    History of Present Illness:  Brandi Duncan is a 76 y.o. female who presents for a Subsequent Medicare Wellness Visit.    The following portions of the patient's history were reviewed and   updated as appropriate: allergies, current medications, past family history, past medical history, past social history, past surgical history and problem list.    Compared to one year ago, the patient feels her physical   health is the same.    Compared to one year ago, the patient feels her mental   health is the same.    Recent Hospitalizations:  She was not admitted to the hospital during the last year.       Current Medical Providers:  Patient Care Team:  Abram Myers MD as PCP - General (Internal Medicine)  Yarelis Eddy MD as Consulting Physician (Colon and Rectal Surgery)  Yarelis Eddy MD as Consulting Physician (Colon and Rectal Surgery)  Vamsi Fleming MD as Consulting Physician (Neurology)  Lei Fontaine MD as Consulting Physician (Hand Surgery)  Brandi Aly MD as Consulting Physician (Dermatology)  Derick Cho (Urology)  Bautista Crystal MD as Consulting Physician (Otolaryngology)  Davy Singh MD as Consulting Physician (Cardiology)  Keo Tafoya MD as Surgeon (Orthopedic Surgery)    Outpatient Medications Prior to Visit   Medication Sig Dispense Refill   • acetaminophen (TYLENOL) 650 MG 8 hr tablet Take 1,300 mg by mouth Every 8 (Eight) Hours As Needed for Mild Pain .     • AMITIZA 24 MCG capsule Take 1 capsule by mouth 2 (Two) Times a Day.     • B Complex Vitamins (VITAMIN B COMPLEX PO) Take 1 tablet by mouth Daily.     • Calcium-Magnesium-Vitamin D (CALCIUM MAGNESIUM PO) Take  by mouth.     • custom compounded cream Apply  topically to the appropriate area as directed. CUSTOM COMPOUNDED  RECTAL SUPPOSITORIES  BELLADONNA/MORPHINE SULF 16.2/5MG   INSERT 1 SUPPOSITORY TWICE DAILY AS NEEDED     • custom compounded cream Apply  topically to the appropriate area as directed. CUSTOM COMPOUNDED RECTAL SUPPOSITORY  HYDROCORTISONE/LIDOCAINE 2.5% / 2%  INSERT 1 SUPPOSITORY TWICE DAILY AS NEEDED     • DHA-EPA-Vitamin E (OMEGA-3 COMPLEX PO) Take 1 capsule by mouth Daily.     • diazePAM (DIASTAT) 2.5 MG gel Insert 5 mg into the rectum At Night As Needed.     • diclofenac (VOLTAREN) 1 % gel gel Apply four grams topically to affected areas four times a day as needed for pain. 100 g 1   • Dietary Management Product (VASCULERA PO) Take 1 tablet by mouth Daily.     • Diphenhydramine-APAP, sleep, (TYLENOL PM EXTRA STRENGTH PO) Take 2 tablets by mouth At Night As Needed.     • famotidine (PEPCID) 20 MG tablet Take 20 mg by mouth 2 (Two) Times a Day As Needed.     • fluticasone (FLONASE) 50 MCG/ACT nasal spray 2 sprays into the nostril(s) as directed by provider Daily. 16 g 3   • gabapentin (NEURONTIN) 300 MG capsule Take 1 capsule by mouth 4 (Four) Times a Day.     • Lidocaine, Anorectal, (LMX 5) 5 % cream cream Apply  topically 4 (Four) Times a Day As Needed.     • loratadine-pseudoephedrine (Claritin-D 12 Hour) 5-120 MG per 12 hr tablet Take 1 tablet by mouth 2 (Two) Times a Day As Needed for Allergies. 60 tablet 2   • meclizine (ANTIVERT) 12.5 MG tablet Take 1 tablet by mouth Every 6 (Six) Hours As Needed.     • montelukast (SINGULAIR) 10 MG tablet Take 1 tablet by mouth Daily. 90 tablet 1   • pantoprazole (PROTONIX) 40 MG EC tablet Take 1 tablet by mouth Daily. 90 tablet 1   • polyethylene glycol (MIRALAX) powder Take 17 g by mouth Daily As Needed (constipation).     • Probiotic Product (PROBIOTIC DAILY PO) Take 1 tablet by mouth Daily.     • raloxifene (EVISTA) 60 MG tablet Take 1 tablet by mouth Daily. 90 tablet 1   • Simethicone (GAS-X PO) Take  by mouth.     • traMADol (ULTRAM) 50 MG tablet Take 1 tablet by  "mouth Every 6 (Six) Hours As Needed for Moderate Pain . 120 tablet 0   • triamterene-hydrochlorothiazide (DYAZIDE) 37.5-25 MG per capsule Take 1 capsule by mouth Daily.     • VITAMIN D, CHOLECALCIFEROL, PO Take  by mouth.     • Wheat Dextrin (BENEFIBER DRINK MIX PO) Take  by mouth.     • MAGNESIUM GLUCONATE PO Take 350 mg of amoxicillin by mouth Daily.       No facility-administered medications prior to visit.       Opioid medication/s are on active medication list.  and I have evaluated her active treatment plan and pain score trends (see table).  There were no vitals filed for this visit.  I have reviewed the chart for potential of high risk medication and harmful drug interactions in the elderly.            Aspirin is not on active medication list.  Aspirin use is not indicated based on review of current medical condition/s. Risk of harm outweighs potential benefits.  .    Patient Active Problem List   Diagnosis   • Rectal pain   • S/P hemorrhoidectomy 11/1/16   • Urinary retention   • Acute urinary retention   • Chronic low back pain   • Osteopenia of neck of femur     Advance Care Planning  Advance Directive is on file.  ACP discussion was held with the patient during this visit. Patient has an advance directive in EMR which is still valid.           Objective    Vitals:    06/08/22 1328   BP: 126/70   BP Location: Right arm   Patient Position: Sitting   Cuff Size: Adult   Pulse: 74   Resp: 16   Temp: 98 °F (36.7 °C)   TempSrc: Infrared   Weight: 60.7 kg (133 lb 12.8 oz)   Height: 153.7 cm (60.5\")     Estimated body mass index is 25.7 kg/m² as calculated from the following:    Height as of this encounter: 153.7 cm (60.5\").    Weight as of this encounter: 60.7 kg (133 lb 12.8 oz).    BMI is >= 25 and <30. (Overweight) The following options were offered after discussion;: exercise counseling/recommendations and nutrition counseling/recommendations      Does the patient have evidence of cognitive impairment? " No    Physical Exam       Finger Rub Hearing{Test (right ear):passed  Finger Rub Hearing{Test (left ear):borderline          HEALTH RISK ASSESSMENT    Smoking Status:  Social History     Tobacco Use   Smoking Status Former Smoker   • Packs/day: 0.00   • Years: 8.00   • Pack years: 0.00   • Types: Cigars   • Start date:    • Quit date:    • Years since quittin.4   Smokeless Tobacco Never Used   Tobacco Comment    sporatic cigar     Alcohol Consumption:  Social History     Substance and Sexual Activity   Alcohol Use Yes   • Alcohol/week: 7.0 standard drinks   • Types: 7 Glasses of wine per week     Fall Risk Screen:    STEADI Fall Risk Assessment was completed, and patient is at LOW risk for falls.Assessment completed on:2022    Depression Screening:  PHQ-2/PHQ-9 Depression Screening 2022   Retired Total Score -   Little Interest or Pleasure in Doing Things 0-->not at all   Feeling Down, Depressed or Hopeless 0-->not at all   PHQ-9: Brief Depression Severity Measure Score 0       Health Habits and Functional and Cognitive Screening:  Functional & Cognitive Status 2022   Do you have difficulty preparing food and eating? No   Do you have difficulty bathing yourself, getting dressed or grooming yourself? No   Do you have difficulty using the toilet? No   Do you have difficulty moving around from place to place? No   Do you have trouble with steps or getting out of a bed or a chair? No   Current Diet Well Balanced Diet   Dental Exam Up to date   Eye Exam Up to date   Exercise (times per week) 0 times per week   Current Exercises Include No Regular Exercise   Current Exercise Activities Include -   Do you need help using the phone?  No   Are you deaf or do you have serious difficulty hearing?  No   Do you need help with transportation? No   Do you need help shopping? No   Do you need help preparing meals?  No   Do you need help with housework?  No   Do you need help with laundry? No   Do you need  help taking your medications? No   Do you need help managing money? No   Do you ever drive or ride in a car without wearing a seat belt? No   Have you felt unusual stress, anger or loneliness in the last month? No   Who do you live with? Spouse   If you need help, do you have trouble finding someone available to you? No   Have you been bothered in the last four weeks by sexual problems? No   Do you have difficulty concentrating, remembering or making decisions? No       Age-appropriate Screening Schedule:  Refer to the list below for future screening recommendations based on patient's age, sex and/or medical conditions. Orders for these recommended tests are listed in the plan section. The patient has been provided with a written plan.    Health Maintenance   Topic Date Due   • TDAP/TD VACCINES (1 - Tdap) Never done   • INFLUENZA VACCINE  08/01/2022   • DXA SCAN  01/20/2023   • MAMMOGRAM  05/20/2024   • ZOSTER VACCINE  Completed              Assessment & Plan   CMS Preventative Services Quick Reference  Risk Factors Identified During Encounter  Immunizations Discussed/Encouraged (specific Immunizations; Tdap  Obesity/Overweight   The above risks/problems have been discussed with the patient.  Follow up actions/plans if indicated are seen below in the Assessment/Plan Section.  Pertinent information has been shared with the patient in the After Visit Summary.      Diagnoses and all orders for this visit:    1. Medicare annual wellness visit, subsequent (Primary)    2. Essential hypertension  -     CBC & Differential; Future  -     Comprehensive Metabolic Panel; Future  -     Lipid Panel; Future  -     TSH; Future  -     POC Urinalysis Dipstick, Automated; Future    3. Gastroesophageal reflux disease without esophagitis

## 2022-06-09 LAB
ALBUMIN SERPL-MCNC: 4.2 G/DL (ref 3.5–5.2)
ALBUMIN/GLOB SERPL: 1.4 G/DL
ALP SERPL-CCNC: 52 U/L (ref 39–117)
ALT SERPL W P-5'-P-CCNC: 13 U/L (ref 1–33)
ANION GAP SERPL CALCULATED.3IONS-SCNC: 11.2 MMOL/L (ref 5–15)
AST SERPL-CCNC: 23 U/L (ref 1–32)
BILIRUB SERPL-MCNC: 0.3 MG/DL (ref 0–1.2)
BUN SERPL-MCNC: 16 MG/DL (ref 8–23)
BUN/CREAT SERPL: 19.5 (ref 7–25)
CALCIUM SPEC-SCNC: 9 MG/DL (ref 8.6–10.5)
CHLORIDE SERPL-SCNC: 97 MMOL/L (ref 98–107)
CO2 SERPL-SCNC: 25.8 MMOL/L (ref 22–29)
CREAT SERPL-MCNC: 0.82 MG/DL (ref 0.57–1)
EGFRCR SERPLBLD CKD-EPI 2021: 74.2 ML/MIN/1.73
GLOBULIN UR ELPH-MCNC: 3 GM/DL
GLUCOSE SERPL-MCNC: 91 MG/DL (ref 65–99)
POTASSIUM SERPL-SCNC: 3.9 MMOL/L (ref 3.5–5.2)
PROT SERPL-MCNC: 7.2 G/DL (ref 6–8.5)
SODIUM SERPL-SCNC: 134 MMOL/L (ref 136–145)

## 2022-06-27 DIAGNOSIS — G89.29 CHRONIC LOW BACK PAIN WITHOUT SCIATICA, UNSPECIFIED BACK PAIN LATERALITY: ICD-10-CM

## 2022-06-27 DIAGNOSIS — M54.50 CHRONIC LOW BACK PAIN WITHOUT SCIATICA, UNSPECIFIED BACK PAIN LATERALITY: ICD-10-CM

## 2022-06-27 RX ORDER — TRAMADOL HYDROCHLORIDE 50 MG/1
50 TABLET ORAL EVERY 6 HOURS PRN
Qty: 120 TABLET | Refills: 0 | Status: SHIPPED | OUTPATIENT
Start: 2022-06-27 | End: 2022-07-25 | Stop reason: SDUPTHER

## 2022-06-28 ENCOUNTER — OFFICE VISIT (OUTPATIENT)
Dept: INTERNAL MEDICINE | Facility: CLINIC | Age: 77
End: 2022-06-28

## 2022-06-28 ENCOUNTER — TELEPHONE (OUTPATIENT)
Dept: INTERNAL MEDICINE | Facility: CLINIC | Age: 77
End: 2022-06-28

## 2022-06-28 ENCOUNTER — HOSPITAL ENCOUNTER (OUTPATIENT)
Dept: GENERAL RADIOLOGY | Facility: HOSPITAL | Age: 77
Discharge: HOME | End: 2022-06-28
Admitting: INTERNAL MEDICINE

## 2022-06-28 VITALS
TEMPERATURE: 97.8 F | DIASTOLIC BLOOD PRESSURE: 58 MMHG | RESPIRATION RATE: 16 BRPM | SYSTOLIC BLOOD PRESSURE: 122 MMHG | WEIGHT: 134 LBS | BODY MASS INDEX: 25.74 KG/M2 | HEART RATE: 76 BPM

## 2022-06-28 DIAGNOSIS — M25.562 CHRONIC PAIN OF LEFT KNEE: Primary | ICD-10-CM

## 2022-06-28 DIAGNOSIS — G89.29 CHRONIC PAIN OF LEFT KNEE: Primary | ICD-10-CM

## 2022-06-28 DIAGNOSIS — M25.562 CHRONIC PAIN OF LEFT KNEE: ICD-10-CM

## 2022-06-28 DIAGNOSIS — G89.29 CHRONIC PAIN OF LEFT KNEE: ICD-10-CM

## 2022-06-28 PROCEDURE — 99213 OFFICE O/P EST LOW 20 MIN: CPT | Performed by: INTERNAL MEDICINE

## 2022-06-28 PROCEDURE — 73562 X-RAY EXAM OF KNEE 3: CPT

## 2022-06-28 NOTE — TELEPHONE ENCOUNTER
Toshia Gonzalez RN   6/28/2022  3:13 PM EDT Back to Top        Left message voice mail for patient to please return call.  Office # given.     Abram Myers MD   6/28/2022  2:56 PM EDT         Please notify her that her x-ray knee is normal except for mild arthritis.

## 2022-06-29 NOTE — PROGRESS NOTES
Chief Complaint   Patient presents with   • Follow-up     Follow up left knee pain       History of Present Illness    The patient presents with pain in the left knee of many months duration. There is no history of trauma. The patient has no joint swelling. There is stiffness. The joint stiffness is present most of the time. The patient denies a history of overuse or repetitive motion with the affected joints.    The joint pain is aggravated by motion, walking and bending. The pain has no alleviating factors noted.    The patient denies dry eyes, shortness of breath, fevers, cough, dry mouth, hematuria, headaches or abdominal pain. There are no associated insect bites. There is no family history of rheumatoid arthritis, juvenile rheumatoid arthritis, systemic lupus erythematosis or gout. The patient denies a personal history of malignancy.    Medications      Current Outpatient Medications:   •  acetaminophen (TYLENOL) 650 MG 8 hr tablet, Take 1,300 mg by mouth Every 8 (Eight) Hours As Needed for Mild Pain ., Disp: , Rfl:   •  AMITIZA 24 MCG capsule, Take 1 capsule by mouth 2 (Two) Times a Day., Disp: , Rfl:   •  B Complex Vitamins (VITAMIN B COMPLEX PO), Take 1 tablet by mouth Daily., Disp: , Rfl:   •  Calcium-Magnesium-Vitamin D (CALCIUM MAGNESIUM PO), Take  by mouth., Disp: , Rfl:   •  custom compounded cream, Apply  topically to the appropriate area as directed. CUSTOM COMPOUNDED RECTAL SUPPOSITORIES BELLADONNA/MORPHINE SULF 16.2/5MG  INSERT 1 SUPPOSITORY TWICE DAILY AS NEEDED, Disp: , Rfl:   •  custom compounded cream, Apply  topically to the appropriate area as directed. CUSTOM COMPOUNDED RECTAL SUPPOSITORY HYDROCORTISONE/LIDOCAINE 2.5% / 2% INSERT 1 SUPPOSITORY TWICE DAILY AS NEEDED, Disp: , Rfl:   •  DHA-EPA-Vitamin E (OMEGA-3 COMPLEX PO), Take 1 capsule by mouth Daily., Disp: , Rfl:   •  diazePAM (DIASTAT) 2.5 MG gel, Insert 5 mg into the rectum At Night As Needed., Disp: , Rfl:   •  diclofenac (VOLTAREN) 1  % gel gel, Apply four grams topically to affected areas four times a day as needed for pain., Disp: 100 g, Rfl: 1  •  Dietary Management Product (VASCULERA PO), Take 1 tablet by mouth Daily., Disp: , Rfl:   •  Diphenhydramine-APAP, sleep, (TYLENOL PM EXTRA STRENGTH PO), Take 2 tablets by mouth At Night As Needed., Disp: , Rfl:   •  famotidine (PEPCID) 20 MG tablet, Take 20 mg by mouth 2 (Two) Times a Day As Needed., Disp: , Rfl:   •  fluticasone (FLONASE) 50 MCG/ACT nasal spray, 2 sprays into the nostril(s) as directed by provider Daily., Disp: 16 g, Rfl: 3  •  gabapentin (NEURONTIN) 300 MG capsule, Take 1 capsule by mouth 5 (Five) Times a Day., Disp: , Rfl:   •  Lidocaine, Anorectal, (LMX 5) 5 % cream cream, Apply  topically 4 (Four) Times a Day As Needed., Disp: , Rfl:   •  loratadine-pseudoephedrine (Claritin-D 12 Hour) 5-120 MG per 12 hr tablet, Take 1 tablet by mouth 2 (Two) Times a Day As Needed for Allergies., Disp: 60 tablet, Rfl: 2  •  meclizine (ANTIVERT) 12.5 MG tablet, Take 1 tablet by mouth Every 6 (Six) Hours As Needed., Disp: , Rfl:   •  montelukast (SINGULAIR) 10 MG tablet, Take 1 tablet by mouth Daily., Disp: 90 tablet, Rfl: 1  •  pantoprazole (PROTONIX) 40 MG EC tablet, Take 1 tablet by mouth Daily., Disp: 90 tablet, Rfl: 1  •  polyethylene glycol (MIRALAX) powder, Take 17 g by mouth Daily As Needed (constipation)., Disp: , Rfl:   •  Probiotic Product (PROBIOTIC DAILY PO), Take 1 tablet by mouth Daily., Disp: , Rfl:   •  raloxifene (EVISTA) 60 MG tablet, Take 1 tablet by mouth Daily., Disp: 90 tablet, Rfl: 1  •  Simethicone (GAS-X PO), Take  by mouth., Disp: , Rfl:   •  traMADol (ULTRAM) 50 MG tablet, Take 1 tablet by mouth Every 6 (Six) Hours As Needed for Moderate Pain ., Disp: 120 tablet, Rfl: 0  •  triamterene-hydrochlorothiazide (DYAZIDE) 37.5-25 MG per capsule, Take 1 capsule by mouth Daily., Disp: , Rfl:   •  VITAMIN D, CHOLECALCIFEROL, PO, Take  by mouth., Disp: , Rfl:   •  Wheat Dextrin  (BENEFIBER DRINK MIX PO), Take  by mouth., Disp: , Rfl:      Allergies    Allergies   Allergen Reactions   • Motrin [Ibuprofen] Nausea Only       Problem List    Patient Active Problem List   Diagnosis   • Rectal pain   • S/P hemorrhoidectomy 11/1/16   • Urinary retention   • Acute urinary retention   • Chronic low back pain   • Osteopenia of neck of femur       Medications, Allergies, Problems List and Past History were reviewed and updated.    Physical Examination    /58 (BP Location: Left arm, Patient Position: Sitting, Cuff Size: Adult)   Pulse 76   Temp 97.8 °F (36.6 °C) (Infrared)   Resp 16   Wt 60.8 kg (134 lb)   LMP  (LMP Unknown)   Breastfeeding No   BMI 25.74 kg/m²     Knees: The left knee is symmetric with normal brian landmarks. There is no tenderness to palpation of the left knee. The left patella tracks midline. There is no patellar pain with quadriceps contraction (Grind Test)on the left. There is no pain over the left prepatellar bursa. The left sided Abduction Stress Test for medial collateral ligaments is normal. The left sided Adduction Stress Test for lateral collateral ligaments is normal. The left sided Anterior Drawer Test is normal. The left sided Posterior Drawer Test is normal. Lachman Test is normal on the left. Nata Test of the medial and lateral meniscus is normal on the left.    Impression and Assessment    Left Knee Pain.    Plan    Left Knee Pain Plan: The patient was instructed to use over the counter NSAIDs for pain and inflammation. She was referred to orthopedics. Further plans will be made following testing.    Diagnoses and all orders for this visit:    1. Chronic pain of left knee (Primary)  -     XR Knee 3 View Left; Future  -     Ambulatory Referral to Orthopedic Surgery        Return to Office    The patient was instructed to return for follow-up at the next scheduled visit. The patient was instructed to return sooner if the condition changes, worsens, or  does not resolve.

## 2022-07-25 DIAGNOSIS — M54.50 CHRONIC LOW BACK PAIN WITHOUT SCIATICA, UNSPECIFIED BACK PAIN LATERALITY: ICD-10-CM

## 2022-07-25 DIAGNOSIS — G89.29 CHRONIC LOW BACK PAIN WITHOUT SCIATICA, UNSPECIFIED BACK PAIN LATERALITY: ICD-10-CM

## 2022-07-25 RX ORDER — TRAMADOL HYDROCHLORIDE 50 MG/1
50 TABLET ORAL EVERY 6 HOURS PRN
Qty: 120 TABLET | Refills: 0 | Status: SHIPPED | OUTPATIENT
Start: 2022-07-25 | End: 2022-08-25 | Stop reason: SDUPTHER

## 2022-08-11 RX ORDER — MONTELUKAST SODIUM 10 MG/1
10 TABLET ORAL DAILY
Qty: 90 TABLET | Refills: 1 | Status: SHIPPED | OUTPATIENT
Start: 2022-08-11 | End: 2022-11-15

## 2022-08-25 DIAGNOSIS — M54.50 CHRONIC LOW BACK PAIN WITHOUT SCIATICA, UNSPECIFIED BACK PAIN LATERALITY: ICD-10-CM

## 2022-08-25 DIAGNOSIS — G89.29 CHRONIC LOW BACK PAIN WITHOUT SCIATICA, UNSPECIFIED BACK PAIN LATERALITY: ICD-10-CM

## 2022-08-25 RX ORDER — TRAMADOL HYDROCHLORIDE 50 MG/1
50 TABLET ORAL EVERY 6 HOURS PRN
Qty: 120 TABLET | Refills: 0 | Status: SHIPPED | OUTPATIENT
Start: 2022-08-25 | End: 2022-10-02 | Stop reason: SDUPTHER

## 2022-09-23 ENCOUNTER — TELEPHONE (OUTPATIENT)
Dept: INTERNAL MEDICINE | Facility: CLINIC | Age: 77
End: 2022-09-23

## 2022-09-23 NOTE — TELEPHONE ENCOUNTER
Caller: NAINA WITH [facility]    Relationship: Other    Best call back number: [phone]    What form or medical record are you requesting: PLAN OF CARE RETURN FAX FROM AUGUST 19TH 2022.    Who is requesting this form or medical record from you: [facility]    How would you like to receive the form or medical records (pick-up, mail, fax): FAX  If fax, what is the fax number: [phone]    Timeframe paperwork needed: AS SOON AS POSSIBLE. NAINA IS RE-FAXING THIS TODAY, 9.23.22.    Additional notes: PLEASE HAVE DR. ROMERO SIGN AND RETURN THIS FAX AS SOON AS POSSIBLE.     THANK YOU.

## 2022-09-23 NOTE — TELEPHONE ENCOUNTER
Fax placed to PCP inbox for review 9/26/2022.   How Severe Are Your Spot(S)?: mild What Is The Reason For Today's Visit?: Full Body Skin Examination What Is The Reason For Today's Visit? (Being Monitored For X): concerning skin lesions on an annual basis

## 2022-10-02 DIAGNOSIS — G89.29 CHRONIC LOW BACK PAIN WITHOUT SCIATICA, UNSPECIFIED BACK PAIN LATERALITY: ICD-10-CM

## 2022-10-02 DIAGNOSIS — M54.50 CHRONIC LOW BACK PAIN WITHOUT SCIATICA, UNSPECIFIED BACK PAIN LATERALITY: ICD-10-CM

## 2022-10-04 RX ORDER — TRAMADOL HYDROCHLORIDE 50 MG/1
50 TABLET ORAL EVERY 6 HOURS PRN
Qty: 120 TABLET | Refills: 0 | Status: SHIPPED | OUTPATIENT
Start: 2022-10-04 | End: 2022-11-08 | Stop reason: SDUPTHER

## 2022-10-04 RX ORDER — RALOXIFENE HYDROCHLORIDE 60 MG/1
60 TABLET, FILM COATED ORAL DAILY
Qty: 90 TABLET | Refills: 1 | Status: SHIPPED | OUTPATIENT
Start: 2022-10-04 | End: 2023-03-03

## 2022-10-25 DIAGNOSIS — G89.29 CHRONIC LOW BACK PAIN WITHOUT SCIATICA, UNSPECIFIED BACK PAIN LATERALITY: ICD-10-CM

## 2022-10-25 DIAGNOSIS — M54.50 CHRONIC LOW BACK PAIN WITHOUT SCIATICA, UNSPECIFIED BACK PAIN LATERALITY: ICD-10-CM

## 2022-10-25 RX ORDER — TRAMADOL HYDROCHLORIDE 50 MG/1
50 TABLET ORAL EVERY 6 HOURS PRN
Qty: 120 TABLET | Refills: 0 | OUTPATIENT
Start: 2022-10-25

## 2022-10-25 RX ORDER — PANTOPRAZOLE SODIUM 40 MG/1
TABLET, DELAYED RELEASE ORAL
Qty: 90 TABLET | Refills: 1 | Status: SHIPPED | OUTPATIENT
Start: 2022-10-25

## 2022-10-25 NOTE — TELEPHONE ENCOUNTER
Refill requested approximately 20 days after prior prescription.  Has been given 30-day supply.  We will not refill at this time.    Dr. Carrillo

## 2022-11-08 DIAGNOSIS — M54.50 CHRONIC LOW BACK PAIN WITHOUT SCIATICA, UNSPECIFIED BACK PAIN LATERALITY: ICD-10-CM

## 2022-11-08 DIAGNOSIS — G89.29 CHRONIC LOW BACK PAIN WITHOUT SCIATICA, UNSPECIFIED BACK PAIN LATERALITY: ICD-10-CM

## 2022-11-08 RX ORDER — TRAMADOL HYDROCHLORIDE 50 MG/1
50 TABLET ORAL EVERY 6 HOURS PRN
Qty: 120 TABLET | Refills: 0 | Status: SHIPPED | OUTPATIENT
Start: 2022-11-08 | End: 2022-12-10 | Stop reason: SDUPTHER

## 2022-11-08 NOTE — TELEPHONE ENCOUNTER
Rx Refill Note  Requested Prescriptions     Pending Prescriptions Disp Refills   • traMADol (ULTRAM) 50 MG tablet 120 tablet 0     Sig: Take 1 tablet by mouth Every 6 (Six) Hours As Needed for Moderate Pain.      Last office visit with prescribing clinician: 6/28/2022      Next office visit with prescribing clinician: 11/15/2022   Ana Maria Marcelo MA  11/08/22, 11:42 EST     Last fill: 10/4/22  UDS:Needs to be updated   CSA:Needs to be updated

## 2022-11-15 ENCOUNTER — OFFICE VISIT (OUTPATIENT)
Dept: INTERNAL MEDICINE | Facility: CLINIC | Age: 77
End: 2022-11-15

## 2022-11-15 ENCOUNTER — LAB (OUTPATIENT)
Dept: LAB | Facility: HOSPITAL | Age: 77
End: 2022-11-15

## 2022-11-15 VITALS
DIASTOLIC BLOOD PRESSURE: 62 MMHG | SYSTOLIC BLOOD PRESSURE: 118 MMHG | TEMPERATURE: 98.2 F | WEIGHT: 133 LBS | BODY MASS INDEX: 25.55 KG/M2 | RESPIRATION RATE: 16 BRPM | HEART RATE: 72 BPM

## 2022-11-15 DIAGNOSIS — M54.50 CHRONIC LOW BACK PAIN WITHOUT SCIATICA, UNSPECIFIED BACK PAIN LATERALITY: ICD-10-CM

## 2022-11-15 DIAGNOSIS — G89.29 CHRONIC LOW BACK PAIN WITHOUT SCIATICA, UNSPECIFIED BACK PAIN LATERALITY: ICD-10-CM

## 2022-11-15 DIAGNOSIS — K21.9 GASTROESOPHAGEAL REFLUX DISEASE WITHOUT ESOPHAGITIS: ICD-10-CM

## 2022-11-15 DIAGNOSIS — K59.00 CONSTIPATION, UNSPECIFIED CONSTIPATION TYPE: ICD-10-CM

## 2022-11-15 DIAGNOSIS — I10 ESSENTIAL HYPERTENSION: Primary | ICD-10-CM

## 2022-11-15 DIAGNOSIS — I10 ESSENTIAL HYPERTENSION: ICD-10-CM

## 2022-11-15 LAB
ALBUMIN SERPL-MCNC: 4.3 G/DL (ref 3.5–5.2)
ALBUMIN/GLOB SERPL: 1.7 G/DL
ALP SERPL-CCNC: 54 U/L (ref 39–117)
ALT SERPL W P-5'-P-CCNC: 13 U/L (ref 1–33)
ANION GAP SERPL CALCULATED.3IONS-SCNC: 6.4 MMOL/L (ref 5–15)
AST SERPL-CCNC: 23 U/L (ref 1–32)
BASOPHILS # BLD AUTO: 0.07 10*3/MM3 (ref 0–0.2)
BASOPHILS NFR BLD AUTO: 1.5 % (ref 0–1.5)
BILIRUB SERPL-MCNC: 0.3 MG/DL (ref 0–1.2)
BUN SERPL-MCNC: 13 MG/DL (ref 8–23)
BUN/CREAT SERPL: 17.3 (ref 7–25)
CALCIUM SPEC-SCNC: 9.6 MG/DL (ref 8.6–10.5)
CHLORIDE SERPL-SCNC: 98 MMOL/L (ref 98–107)
CO2 SERPL-SCNC: 26.6 MMOL/L (ref 22–29)
CREAT SERPL-MCNC: 0.75 MG/DL (ref 0.57–1)
DEPRECATED RDW RBC AUTO: 38.3 FL (ref 37–54)
EGFRCR SERPLBLD CKD-EPI 2021: 82.6 ML/MIN/1.73
EOSINOPHIL # BLD AUTO: 0.11 10*3/MM3 (ref 0–0.4)
EOSINOPHIL NFR BLD AUTO: 2.4 % (ref 0.3–6.2)
ERYTHROCYTE [DISTWIDTH] IN BLOOD BY AUTOMATED COUNT: 12.2 % (ref 12.3–15.4)
GLOBULIN UR ELPH-MCNC: 2.5 GM/DL
GLUCOSE SERPL-MCNC: 90 MG/DL (ref 65–99)
HCT VFR BLD AUTO: 31.8 % (ref 34–46.6)
HGB BLD-MCNC: 11 G/DL (ref 12–15.9)
IMM GRANULOCYTES # BLD AUTO: 0.02 10*3/MM3 (ref 0–0.05)
IMM GRANULOCYTES NFR BLD AUTO: 0.4 % (ref 0–0.5)
LYMPHOCYTES # BLD AUTO: 1.6 10*3/MM3 (ref 0.7–3.1)
LYMPHOCYTES NFR BLD AUTO: 34.3 % (ref 19.6–45.3)
MCH RBC QN AUTO: 29.6 PG (ref 26.6–33)
MCHC RBC AUTO-ENTMCNC: 34.6 G/DL (ref 31.5–35.7)
MCV RBC AUTO: 85.7 FL (ref 79–97)
MONOCYTES # BLD AUTO: 0.76 10*3/MM3 (ref 0.1–0.9)
MONOCYTES NFR BLD AUTO: 16.3 % (ref 5–12)
NEUTROPHILS NFR BLD AUTO: 2.1 10*3/MM3 (ref 1.7–7)
NEUTROPHILS NFR BLD AUTO: 45.1 % (ref 42.7–76)
NRBC BLD AUTO-RTO: 0 /100 WBC (ref 0–0.2)
PLATELET # BLD AUTO: 289 10*3/MM3 (ref 140–450)
PMV BLD AUTO: 9.4 FL (ref 6–12)
POTASSIUM SERPL-SCNC: 4.4 MMOL/L (ref 3.5–5.2)
PROT SERPL-MCNC: 6.8 G/DL (ref 6–8.5)
RBC # BLD AUTO: 3.71 10*6/MM3 (ref 3.77–5.28)
SODIUM SERPL-SCNC: 131 MMOL/L (ref 136–145)
TSH SERPL DL<=0.05 MIU/L-ACNC: 3.72 UIU/ML (ref 0.27–4.2)
WBC NRBC COR # BLD: 4.66 10*3/MM3 (ref 3.4–10.8)

## 2022-11-15 PROCEDURE — 86258 DGP ANTIBODY EACH IG CLASS: CPT

## 2022-11-15 PROCEDURE — 84443 ASSAY THYROID STIM HORMONE: CPT

## 2022-11-15 PROCEDURE — 99214 OFFICE O/P EST MOD 30 MIN: CPT | Performed by: INTERNAL MEDICINE

## 2022-11-15 PROCEDURE — 86364 TISS TRNSGLTMNASE EA IG CLAS: CPT

## 2022-11-15 PROCEDURE — 86231 EMA EACH IG CLASS: CPT

## 2022-11-15 PROCEDURE — 80053 COMPREHEN METABOLIC PANEL: CPT

## 2022-11-15 PROCEDURE — 85025 COMPLETE CBC W/AUTO DIFF WBC: CPT

## 2022-11-15 PROCEDURE — 82784 ASSAY IGA/IGD/IGG/IGM EACH: CPT

## 2022-11-15 RX ORDER — GABAPENTIN 100 MG/1
100 CAPSULE ORAL 3 TIMES DAILY
Qty: 90 CAPSULE | Refills: 2
Start: 2022-11-15

## 2022-11-15 NOTE — PROGRESS NOTES
Chief Complaint   Patient presents with   • Discuss labs done by neurologist       History of Present Illness      The patient presents for a follow-up related to constipation. The symptoms have been present for a chronic duration. Bowel movements occur approximately every day. The stools are hard. There is no blood in the bowel movement. Bowel Movements are not painful. There is no associated enuresis. The patient does not report abdominal pain. The patient denies neglecting the urge or putting off bowel movements. There is no history of excessive cows milk, calcium, limited fiber, iron supplements, narcotics, antacids or antidepressants. There are no new life stressors. Treatment includes increasing fiber, Miralax, enemas and Amitza. The patient has been compliant with treatment as outlined at the last visit. Her symptoms have remained stable. She had a gluten IgG performed by neurology and it was mildly elevated. She has had proximal small bowel biopsies performed which were negative.    The patient presents for a follow-up related to hypertension. The patient reports that she has had no headaches, chest pain, dyspnea, edema, syncope, blurred vision or palpitations. She states that she is taking her medication as prescribed. She is not having medication side effects.    The patient presents for a follow-up related to GERD. The patient is on famotidine for her gastroesophageal reflux. The medication is taken on a regular basis and gives complete relief of the symptoms. She reports no belching, diarrhea, dysphagia, early satiety, heartburn, hoarseness, nausea, odynophagia or vomiting. The GERD has no known aggravating factors.    The patient presents for follow-up of low back pain. She reports that the pain is improved. The current treatment consists of physical therapy, gabapentin and tramadol.  She does not have associated fever, chills, weight loss, rashes, dysuria, hematuria, or a decreased urine stream. The  pain does not radiate. There is no numbness. The patient denies loss of bladder control. The patient denies loss of bowel control. The patient reports no aggravating factors.    Medications      Current Outpatient Medications:   •  acetaminophen (TYLENOL) 650 MG 8 hr tablet, Take 1,300 mg by mouth Every 8 (Eight) Hours As Needed for Mild Pain ., Disp: , Rfl:   •  AMITIZA 24 MCG capsule, Take 1 capsule by mouth 2 (Two) Times a Day., Disp: , Rfl:   •  B Complex Vitamins (VITAMIN B COMPLEX PO), Take 1 tablet by mouth Daily., Disp: , Rfl:   •  Calcium-Magnesium-Vitamin D (CALCIUM MAGNESIUM PO), Take  by mouth., Disp: , Rfl:   •  custom compounded cream, Apply  topically to the appropriate area as directed. CUSTOM COMPOUNDED RECTAL SUPPOSITORIES BELLADONNA/MORPHINE SULF 16.2/5MG  INSERT 1 SUPPOSITORY TWICE DAILY AS NEEDED, Disp: , Rfl:   •  custom compounded cream, Apply  topically to the appropriate area as directed. CUSTOM COMPOUNDED RECTAL SUPPOSITORY HYDROCORTISONE/LIDOCAINE 2.5% / 2% INSERT 1 SUPPOSITORY TWICE DAILY AS NEEDED, Disp: , Rfl:   •  DHA-EPA-Vitamin E (OMEGA-3 COMPLEX PO), Take 1 capsule by mouth Daily., Disp: , Rfl:   •  diazePAM (DIASTAT) 2.5 MG gel, Insert 5 mg into the rectum At Night As Needed., Disp: , Rfl:   •  diclofenac (VOLTAREN) 1 % gel gel, Apply four grams topically to affected areas four times a day as needed for pain., Disp: 100 g, Rfl: 1  •  Dietary Management Product (VASCULERA PO), Take 1 tablet by mouth Daily., Disp: , Rfl:   •  Diphenhydramine-APAP, sleep, (TYLENOL PM EXTRA STRENGTH PO), Take 2 tablets by mouth At Night As Needed., Disp: , Rfl:   •  famotidine (PEPCID) 20 MG tablet, Take 20 mg by mouth 2 (Two) Times a Day As Needed., Disp: , Rfl:   •  fluticasone (FLONASE) 50 MCG/ACT nasal spray, 2 sprays into the nostril(s) as directed by provider Daily., Disp: 16 g, Rfl: 3  •  gabapentin (NEURONTIN) 300 MG capsule, Take 1 capsule by mouth 5 (Five) Times a Day., Disp: , Rfl:   •   Lidocaine, Anorectal, (LMX 5) 5 % cream cream, Apply  topically 4 (Four) Times a Day As Needed., Disp: , Rfl:   •  loratadine-pseudoephedrine (Claritin-D 12 Hour) 5-120 MG per 12 hr tablet, Take 1 tablet by mouth 2 (Two) Times a Day As Needed for Allergies., Disp: 60 tablet, Rfl: 2  •  meclizine (ANTIVERT) 12.5 MG tablet, Take 1 tablet by mouth Every 6 (Six) Hours As Needed., Disp: , Rfl:   •  pantoprazole (PROTONIX) 40 MG EC tablet, TAKE ONE TABLET BY MOUTH DAILY, Disp: 90 tablet, Rfl: 1  •  polyethylene glycol (MIRALAX) powder, Take 17 g by mouth Daily As Needed (constipation)., Disp: , Rfl:   •  Probiotic Product (PROBIOTIC DAILY PO), Take 1 tablet by mouth Daily., Disp: , Rfl:   •  raloxifene (EVISTA) 60 MG tablet, Take 1 tablet by mouth Daily., Disp: 90 tablet, Rfl: 1  •  Simethicone (GAS-X PO), Take  by mouth., Disp: , Rfl:   •  traMADol (ULTRAM) 50 MG tablet, Take 1 tablet by mouth Every 6 (Six) Hours As Needed for Moderate Pain., Disp: 120 tablet, Rfl: 0  •  triamterene-hydrochlorothiazide (DYAZIDE) 37.5-25 MG per capsule, Take 1 capsule by mouth Daily., Disp: , Rfl:   •  VITAMIN D, CHOLECALCIFEROL, PO, Take  by mouth., Disp: , Rfl:   •  Wheat Dextrin (BENEFIBER DRINK MIX PO), Take  by mouth., Disp: , Rfl:   •  gabapentin (NEURONTIN) 100 MG capsule, Take 1 capsule by mouth 3 (Three) Times a Day., Disp: 90 capsule, Rfl: 2     Allergies    Allergies   Allergen Reactions   • Motrin [Ibuprofen] Nausea Only       Problem List    Patient Active Problem List   Diagnosis   • Rectal pain   • S/P hemorrhoidectomy 11/1/16   • Urinary retention   • Acute urinary retention   • Chronic low back pain   • Osteopenia of neck of femur       Medications, Allergies, Problems List and Past History were reviewed and updated.    Physical Examination    /62 (BP Location: Right arm, Patient Position: Sitting, Cuff Size: Adult)   Pulse 72   Temp 98.2 °F (36.8 °C) (Infrared)   Resp 16   Wt 60.3 kg (133 lb)   LMP  (LMP  Unknown)   BMI 25.55 kg/m²     HEENT: Head- Normocephalic Atraumatic. Facies- Within normal limits. Pinnas- Normal texture and shape bilaterally. Canals- Normal bilaterally. TMs- Normal bilaterally. Nares- Patent bilaterally. Nasal Septum- is normal. There is no tenderness to palpation over the frontal or maxillary sinuses. Lids- Normal bilaterally. Conjunctiva- Clear bilaterally. Sclera- Anicteric bilaterally. Oropharynx- Moist with no lesions. Tonsils- No enlargement, erythema or exudate.    Neck: Thyroid- non enlarged, symmetric and has no nodules. No bruits are detected. ROM- Normal Range of Motion with no rigidity.    Lungs: Auscultation- Clear to auscultation bilaterally. There are no retractions, clubbing or cyanosis. The Expiratory to Inspiratory ratio is equal.    Lymph Nodes: Cervical- no enlarged lymph nodes noted. Clavicular- Deferred. Axillary- Deferred. Inguinal- Deferred.    Cardiovascular: There are no carotid bruits. Heart- Normal Rate with Regular rhythm and no murmurs. There are no gallops. There are no rubs. In the lower extremities there is no edema. The upper extremities do not have edema.    Abdomen: Soft, benign, non-tender with no masses, hernias, organomegaly or scars.    Back: The patient has a normal spinal curvature with no evidence of scoliosis. There are no skin lesions noted on the back. Palpation reveals no tenderness and normal muscle tone. The straight leg raising test is negative. The back has normal flexion, extension, rotation, and lateral bending.    Lower Extremity Neuro Exam: Muscle Strength is 5/5 in all major lower extremity muscle groups. Deep Tendon Reflexes are 2+ and equal in the patellar and Achilles distributions bilaterally. Sensation is normal in all distributions.    Impression and Assessment    Constipation.    Essential Hypertension.    Gastroesophageal Reflux Disease.    Low Back Pain.    Plan    Constipation Plan: The patient was instructed to continue the  current medications. Further plans will be made after testing.    Gastroesophageal Reflux Disease Plan: The patient was instructed to continue the current medications.    Essential Hypertension Plan: The patient was instructed to continue the current medications.    Low Back Pain Plan: The patient was instructed to continue the current medications.    Diagnoses and all orders for this visit:    1. Essential hypertension (Primary)  -     CBC & Differential; Future  -     Comprehensive Metabolic Panel; Future    2. Gastroesophageal reflux disease without esophagitis  -     Celiac Comprehensive Panel; Future    3. Constipation, unspecified constipation type  -     Celiac Comprehensive Panel; Future  -     TSH; Future    4. Chronic low back pain without sciatica, unspecified back pain laterality  -     gabapentin (NEURONTIN) 100 MG capsule; Take 1 capsule by mouth 3 (Three) Times a Day.  Dispense: 90 capsule; Refill: 2        Return to Office    The patient was instructed to return for follow-up in 4 months. The patient was instructed to return sooner if the condition changes, worsens, or does not resolve.

## 2022-11-16 LAB
ENDOMYSIUM IGA SER QL: NEGATIVE
GLIADIN PEPTIDE IGA SER-ACNC: 3 UNITS (ref 0–19)
GLIADIN PEPTIDE IGG SER-ACNC: 2 UNITS (ref 0–19)
IGA SERPL-MCNC: 213 MG/DL (ref 64–422)
TTG IGA SER-ACNC: <2 U/ML (ref 0–3)
TTG IGG SER-ACNC: <2 U/ML (ref 0–5)

## 2022-12-10 DIAGNOSIS — G89.29 CHRONIC LOW BACK PAIN WITHOUT SCIATICA, UNSPECIFIED BACK PAIN LATERALITY: ICD-10-CM

## 2022-12-10 DIAGNOSIS — M54.50 CHRONIC LOW BACK PAIN WITHOUT SCIATICA, UNSPECIFIED BACK PAIN LATERALITY: ICD-10-CM

## 2022-12-12 RX ORDER — TRAMADOL HYDROCHLORIDE 50 MG/1
50 TABLET ORAL EVERY 6 HOURS PRN
Qty: 120 TABLET | Refills: 0 | Status: SHIPPED | OUTPATIENT
Start: 2022-12-12 | End: 2023-02-13 | Stop reason: SDUPTHER

## 2022-12-14 ENCOUNTER — TELEPHONE (OUTPATIENT)
Dept: INTERNAL MEDICINE | Facility: CLINIC | Age: 77
End: 2022-12-14

## 2022-12-14 DIAGNOSIS — U07.1 COVID-19 VIRUS INFECTION: Primary | ICD-10-CM

## 2022-12-14 RX ORDER — BENZONATATE 100 MG/1
100 CAPSULE ORAL 3 TIMES DAILY PRN
Qty: 30 CAPSULE | Refills: 0 | Status: SHIPPED | OUTPATIENT
Start: 2022-12-14 | End: 2023-01-19

## 2022-12-14 RX ORDER — GUAIFENESIN AND DEXTROMETHORPHAN HYDROBROMIDE 400; 20 MG/1; MG/1
1 TABLET ORAL EVERY 12 HOURS PRN
Qty: 30 EACH | Refills: 0 | Status: SHIPPED | OUTPATIENT
Start: 2022-12-14 | End: 2023-01-19

## 2022-12-14 NOTE — TELEPHONE ENCOUNTER
Caller: Barndi Duncan    Relationship to patient: Self    Best call back number: [phone]    Date of exposure: 12/12/22 AND 12/10/22    Date of positive COVID19 test: 12/14/22    COVID19 symptoms: SINUS DRAINAGE, COUGHING, A LITTLE SHORT OF BREATH     Date of initial quarantine: 12/14/22    Additional information or concerns: PATIENT WOULD LIKE [facility] CALLED IN IF POSSIBLE    What is the patients preferred pharmacy:     [id number] - [address] - [phone]  - [phone]   [phone]

## 2022-12-14 NOTE — TELEPHONE ENCOUNTER
I have sent in paxlovid.   She can't take diazepam while on paxlovid.  Abram Myers MD  16:43 EST  12/14/22

## 2022-12-15 ENCOUNTER — TELEMEDICINE (OUTPATIENT)
Dept: INTERNAL MEDICINE | Facility: CLINIC | Age: 77
End: 2022-12-15

## 2022-12-15 DIAGNOSIS — U07.1 COVID-19 VIRUS INFECTION: Primary | ICD-10-CM

## 2022-12-15 PROCEDURE — 99213 OFFICE O/P EST LOW 20 MIN: CPT | Performed by: INTERNAL MEDICINE

## 2022-12-15 NOTE — PROGRESS NOTES
Chief Complaint   Patient presents with   • URI     Covid positive     Telemedicine was provided via two-way interactive audiovisual telecommunication (Zoom) due to the COVID-19 outbreak in order to minimize risk of exposure.    Others in attendance:      Patient Location:  [facility]  Provider Location:  [facility]    Risks, benefits and alternative including in-person office visit have been explained to the patient, and the patient has consented to this mode of care.  The visit was conducted on a secure line.  All parties in the room, if any other, were identified and approved by the patient prior to the telemedicine visit.  No technical issues were experienced.  A level of care equivalent to in-person care was achieved.    History of Present Illness    The patient presents with a 2 day history of feeling ill. She has a dry cough, myalgias and nasal discharge but does not have a wet cough, wheezing, fever, facial pain, a headache, eye drainage, ear pain, ear drainage, nausea, vomiting, diarrhea, sore throat, abdominal pain, nasal congestion, decreased appetite, chills or rash. The nasal discharge is clear. She has used paxlovid to treat her illness.  She is on day 1 of paxlovid.    Medications      Current Outpatient Medications:   •  acetaminophen (TYLENOL) 650 MG 8 hr tablet, Take 1,300 mg by mouth Every 8 (Eight) Hours As Needed for Mild Pain ., Disp: , Rfl:   •  AMITIZA 24 MCG capsule, Take 1 capsule by mouth 2 (Two) Times a Day., Disp: , Rfl:   •  B Complex Vitamins (VITAMIN B COMPLEX PO), Take 1 tablet by mouth Daily., Disp: , Rfl:   •  benzonatate (Tessalon Perles) 100 MG capsule, Take 1 capsule by mouth 3 (Three) Times a Day As Needed for Cough., Disp: 30 capsule, Rfl: 0  •  Calcium-Magnesium-Vitamin D (CALCIUM MAGNESIUM PO), Take  by mouth., Disp: , Rfl:   •  custom compounded cream, Apply  topically to the appropriate area as directed. CUSTOM COMPOUNDED RECTAL SUPPOSITORIES  BELLADONNA/MORPHINE SULF 16.2/5MG  INSERT 1 SUPPOSITORY TWICE DAILY AS NEEDED, Disp: , Rfl:   •  custom compounded cream, Apply  topically to the appropriate area as directed. CUSTOM COMPOUNDED RECTAL SUPPOSITORY HYDROCORTISONE/LIDOCAINE 2.5% / 2% INSERT 1 SUPPOSITORY TWICE DAILY AS NEEDED, Disp: , Rfl:   •  Dextromethorphan-guaiFENesin  MG tablet, Take 1 tablet by mouth Every 12 (Twelve) Hours As Needed (cough and congestion)., Disp: 30 each, Rfl: 0  •  DHA-EPA-Vitamin E (OMEGA-3 COMPLEX PO), Take 1 capsule by mouth Daily., Disp: , Rfl:   •  diazePAM (DIASTAT) 2.5 MG gel, Insert 5 mg into the rectum At Night As Needed., Disp: , Rfl:   •  diclofenac (VOLTAREN) 1 % gel gel, Apply four grams topically to affected areas four times a day as needed for pain., Disp: 100 g, Rfl: 1  •  Dietary Management Product (VASCULERA PO), Take 1 tablet by mouth Daily., Disp: , Rfl:   •  Diphenhydramine-APAP, sleep, (TYLENOL PM EXTRA STRENGTH PO), Take 2 tablets by mouth At Night As Needed., Disp: , Rfl:   •  famotidine (PEPCID) 20 MG tablet, Take 20 mg by mouth 2 (Two) Times a Day As Needed., Disp: , Rfl:   •  fluticasone (FLONASE) 50 MCG/ACT nasal spray, 2 sprays into the nostril(s) as directed by provider Daily., Disp: 16 g, Rfl: 3  •  gabapentin (NEURONTIN) 100 MG capsule, Take 1 capsule by mouth 3 (Three) Times a Day., Disp: 90 capsule, Rfl: 2  •  gabapentin (NEURONTIN) 300 MG capsule, Take 1 capsule by mouth 5 (Five) Times a Day., Disp: , Rfl:   •  Lidocaine, Anorectal, (LMX 5) 5 % cream cream, Apply  topically 4 (Four) Times a Day As Needed., Disp: , Rfl:   •  loratadine-pseudoephedrine (Claritin-D 12 Hour) 5-120 MG per 12 hr tablet, Take 1 tablet by mouth 2 (Two) Times a Day As Needed for Allergies., Disp: 60 tablet, Rfl: 2  •  meclizine (ANTIVERT) 12.5 MG tablet, Take 1 tablet by mouth Every 6 (Six) Hours As Needed., Disp: , Rfl:   •  Nirmatrelvir&Ritonavir 300/100 (PAXLOVID) 20 x 150 MG & 10 x 100MG tablet therapy pack  tablet, Take 3 tablets by mouth 2 (Two) Times a Day for 5 days., Disp: 30 tablet, Rfl: 0  •  pantoprazole (PROTONIX) 40 MG EC tablet, TAKE ONE TABLET BY MOUTH DAILY, Disp: 90 tablet, Rfl: 1  •  polyethylene glycol (MIRALAX) powder, Take 17 g by mouth Daily As Needed (constipation)., Disp: , Rfl:   •  Probiotic Product (PROBIOTIC DAILY PO), Take 1 tablet by mouth Daily., Disp: , Rfl:   •  raloxifene (EVISTA) 60 MG tablet, Take 1 tablet by mouth Daily., Disp: 90 tablet, Rfl: 1  •  Simethicone (GAS-X PO), Take  by mouth., Disp: , Rfl:   •  traMADol (ULTRAM) 50 MG tablet, Take 1 tablet by mouth Every 6 (Six) Hours As Needed for Moderate Pain., Disp: 120 tablet, Rfl: 0  •  triamterene-hydrochlorothiazide (DYAZIDE) 37.5-25 MG per capsule, Take 1 capsule by mouth Daily., Disp: , Rfl:   •  VITAMIN D, CHOLECALCIFEROL, PO, Take  by mouth., Disp: , Rfl:   •  Wheat Dextrin (BENEFIBER DRINK MIX PO), Take  by mouth., Disp: , Rfl:      Allergies    Allergies   Allergen Reactions   • Motrin [Ibuprofen] Nausea Only       Problem List    Patient Active Problem List   Diagnosis   • Rectal pain   • S/P hemorrhoidectomy 11/1/16   • Urinary retention   • Acute urinary retention   • Chronic low back pain   • Osteopenia of neck of femur       Medications, Allergies, Problems List and Past History were reviewed and updated.    Physical Examination    LMP  (LMP Unknown)         Impression and Assessment    Covid 19 Infection.    Plan    Covid 19 Infection Plan: A cool mist humidifier was encouraged. Over the counter mucolytic agents were encouraged. She was encouraged to drink plenty of fluids. Continue the current medication regimen. Discussed quarantine protocols.    Diagnoses and all orders for this visit:    1. COVID-19 virus infection (Primary)        Return to Office    The patient was instructed to return for follow-up at the next scheduled visit. The patient was instructed to return sooner if the condition changes, worsens, or does  not resolve.

## 2023-01-19 ENCOUNTER — OFFICE VISIT (OUTPATIENT)
Dept: INTERNAL MEDICINE | Facility: CLINIC | Age: 78
End: 2023-01-19
Payer: MEDICARE

## 2023-01-19 VITALS
WEIGHT: 132 LBS | SYSTOLIC BLOOD PRESSURE: 118 MMHG | DIASTOLIC BLOOD PRESSURE: 60 MMHG | RESPIRATION RATE: 18 BRPM | TEMPERATURE: 98 F | BODY MASS INDEX: 25.36 KG/M2 | HEART RATE: 88 BPM

## 2023-01-19 DIAGNOSIS — M54.50 CHRONIC LOW BACK PAIN WITHOUT SCIATICA, UNSPECIFIED BACK PAIN LATERALITY: Primary | ICD-10-CM

## 2023-01-19 DIAGNOSIS — U07.1 COVID-19 VIRUS INFECTION: ICD-10-CM

## 2023-01-19 DIAGNOSIS — R26.81 GAIT INSTABILITY: ICD-10-CM

## 2023-01-19 DIAGNOSIS — G89.29 CHRONIC LOW BACK PAIN WITHOUT SCIATICA, UNSPECIFIED BACK PAIN LATERALITY: Primary | ICD-10-CM

## 2023-01-19 PROCEDURE — 99213 OFFICE O/P EST LOW 20 MIN: CPT | Performed by: INTERNAL MEDICINE

## 2023-01-19 RX ORDER — CALCIUM CARBONATE 160(400)MG
TABLET,CHEWABLE ORAL
Qty: 1 EACH | Refills: 0 | Status: SHIPPED | OUTPATIENT
Start: 2023-01-19 | End: 2023-03-06

## 2023-01-19 NOTE — PROGRESS NOTES
Chief Complaint   Patient presents with   • Follow-up     Follow up after Covid in December         History of Present Illness    The patient presents for follow-up of low back pain. She reports that the pain is stable. The current treatment consists of no specific therapies.  She does not have associated fever, chills, weight loss, rashes, dysuria, hematuria, or a decreased urine stream. The pain does not radiate. There is no numbness. The patient denies loss of bladder control. The patient denies loss of bowel control. The patient reports no aggravating factors.    The patient presents for a follow-up of gait instability which has been present for longer than one year. The symptoms are stable. There are no complaints of headaches. She denies urinary incontinence. There are no complaints of paresthesias in the upper or lower extremities. She denies neck pain. There are no complaints of back pain. She reports that she does not have joint stiffness. There are no complaints of visual changes. She denies excessive alcohol abuse. The patient denies heat intolerance, cold intolerance, cough, fever, chills, night sweats, dysuria, hematuria, diarrhea, chest pain, shortness of breath or syncope.    The patient reports a 1 month history of upper respiratory symptoms. She had covid in december (twice) and is in for a recheck. All symptoms have resolved. The patient does not have a dry cough, a wet cough, wheezing, facial pain, tooth pain, eye drainage, ear pain, ear drainage, nasal congestion, nausea, vomiting, rash, decreased appetite, abdominal pain, rhinorrhea or sore throat.    Medications      Current Outpatient Medications:   •  acetaminophen (TYLENOL) 650 MG 8 hr tablet, Take 1,300 mg by mouth Every 8 (Eight) Hours As Needed for Mild Pain ., Disp: , Rfl:   •  AMITIZA 24 MCG capsule, Take 1 capsule by mouth 2 (Two) Times a Day., Disp: , Rfl:   •  B Complex Vitamins (VITAMIN B COMPLEX PO), Take 1 tablet by mouth Daily.,  Disp: , Rfl:   •  Calcium-Magnesium-Vitamin D (CALCIUM MAGNESIUM PO), Take  by mouth., Disp: , Rfl:   •  DHA-EPA-Vitamin E (OMEGA-3 COMPLEX PO), Take 1 capsule by mouth Daily., Disp: , Rfl:   •  Dietary Management Product (VASCULERA PO), Take 1 tablet by mouth Daily., Disp: , Rfl:   •  Diphenhydramine-APAP, sleep, (TYLENOL PM EXTRA STRENGTH PO), Take 2 tablets by mouth At Night As Needed., Disp: , Rfl:   •  famotidine (PEPCID) 20 MG tablet, Take 20 mg by mouth 2 (Two) Times a Day As Needed., Disp: , Rfl:   •  fluticasone (FLONASE) 50 MCG/ACT nasal spray, 2 sprays into the nostril(s) as directed by provider Daily., Disp: 16 g, Rfl: 3  •  gabapentin (NEURONTIN) 100 MG capsule, Take 1 capsule by mouth 3 (Three) Times a Day., Disp: 90 capsule, Rfl: 2  •  gabapentin (NEURONTIN) 300 MG capsule, Take 1 capsule by mouth 5 (Five) Times a Day., Disp: , Rfl:   •  Lidocaine, Anorectal, (LMX 5) 5 % cream cream, Apply  topically 4 (Four) Times a Day As Needed., Disp: , Rfl:   •  loratadine-pseudoephedrine (Claritin-D 12 Hour) 5-120 MG per 12 hr tablet, Take 1 tablet by mouth 2 (Two) Times a Day As Needed for Allergies., Disp: 60 tablet, Rfl: 2  •  meclizine (ANTIVERT) 12.5 MG tablet, Take 1 tablet by mouth Every 6 (Six) Hours As Needed., Disp: , Rfl:   •  pantoprazole (PROTONIX) 40 MG EC tablet, TAKE ONE TABLET BY MOUTH DAILY, Disp: 90 tablet, Rfl: 1  •  polyethylene glycol (MIRALAX) powder, Take 17 g by mouth Daily As Needed (constipation)., Disp: , Rfl:   •  Probiotic Product (PROBIOTIC DAILY PO), Take 1 tablet by mouth Daily., Disp: , Rfl:   •  raloxifene (EVISTA) 60 MG tablet, Take 1 tablet by mouth Daily., Disp: 90 tablet, Rfl: 1  •  Simethicone (GAS-X PO), Take  by mouth., Disp: , Rfl:   •  traMADol (ULTRAM) 50 MG tablet, Take 1 tablet by mouth Every 6 (Six) Hours As Needed for Moderate Pain., Disp: 120 tablet, Rfl: 0  •  triamterene-hydrochlorothiazide (DYAZIDE) 37.5-25 MG per capsule, Take 1 capsule by mouth Daily., Disp:  , Rfl:   •  VITAMIN D, CHOLECALCIFEROL, PO, Take  by mouth., Disp: , Rfl:   •  Wheat Dextrin (BENEFIBER DRINK MIX PO), Take  by mouth., Disp: , Rfl:   •  custom compounded cream, Apply  topically to the appropriate area as directed. CUSTOM COMPOUNDED RECTAL SUPPOSITORIES BELLADONNA/MORPHINE SULF 16.2/5MG  INSERT 1 SUPPOSITORY TWICE DAILY AS NEEDED, Disp: , Rfl:   •  custom compounded cream, Apply  topically to the appropriate area as directed. CUSTOM COMPOUNDED RECTAL SUPPOSITORY HYDROCORTISONE/LIDOCAINE 2.5% / 2% INSERT 1 SUPPOSITORY TWICE DAILY AS NEEDED, Disp: , Rfl:   •  diazePAM (DIASTAT) 2.5 MG gel, Insert 5 mg into the rectum At Night As Needed., Disp: , Rfl:   •  diclofenac (VOLTAREN) 1 % gel gel, Apply four grams topically to affected areas four times a day as needed for pain., Disp: 100 g, Rfl: 1  •  Misc. Devices (Rollator Ultra-Light) misc, Use walker with ambulation for gait instability, Disp: 1 each, Rfl: 0     Allergies    Allergies   Allergen Reactions   • Motrin [Ibuprofen] Nausea Only       Problem List    Patient Active Problem List   Diagnosis   • Rectal pain   • S/P hemorrhoidectomy 11/1/16   • Urinary retention   • Acute urinary retention   • Chronic low back pain   • Osteopenia of neck of femur       Medications, Allergies, Problems List and Past History were reviewed and updated.    Physical Examination    /60 (BP Location: Right arm, Patient Position: Sitting, Cuff Size: Adult)   Pulse 88   Temp 98 °F (36.7 °C) (Infrared)   Resp 18   Wt 59.9 kg (132 lb)   LMP  (LMP Unknown)   BMI 25.36 kg/m²     HEENT: Head- Normocephalic Atraumatic. Facies- Within normal limits. Pinnas- Normal texture and shape bilaterally. Canals- Normal bilaterally. TMs- Normal bilaterally. Nares- Patent bilaterally. Nasal Septum- is normal. There is no tenderness to palpation over the frontal or maxillary sinuses. Lids- Normal bilaterally. Conjunctiva- Clear bilaterally. Sclera- Anicteric bilaterally.  Oropharynx- Moist with no lesions. Tonsils- No enlargement, erythema or exudate.    Neck: Thyroid- non enlarged, symmetric and has no nodules. No bruits are detected. ROM- Normal Range of Motion with no rigidity.    Lungs: Auscultation- Clear to auscultation bilaterally. There are no retractions, clubbing or cyanosis. The Expiratory to Inspiratory ratio is equal.    Lymph Nodes: Cervical- no enlarged lymph nodes noted.    Cardiovascular: There are no carotid bruits. Heart- Normal Rate with Regular rhythm and no murmurs. There are no gallops. There are no rubs. In the lower extremities there is no edema. The upper extremities do not have edema.    Abdomen: Soft, benign, non-tender with no masses, hernias, organomegaly or scars.    Back: The patient has a normal spinal curvature with no evidence of scoliosis. There are no skin lesions noted on the back. Palpation reveals no tenderness and normal muscle tone. The straight leg raising test is negative. The back has normal flexion, extension, rotation, and lateral bending.    Gait: Normal.    Impression and Assessment    Low Back Pain.    Gait Instability.    Covid 19 Infection.    Plan    Covid 19 Infection Plan: Resolved.    Low Back Pain Plan: She was referred to physical therapy.    Gait Instability Plan: She was referred to physical therapy. Rollator prescribed.  A handicapped parking form was completed.    Diagnoses and all orders for this visit:    1. Chronic low back pain without sciatica, unspecified back pain laterality (Primary)  -     Ambulatory Referral to Physical Therapy Evaluate and treat  -     Misc. Devices (Rollator Ultra-Light) misc; Use walker with ambulation for gait instability  Dispense: 1 each; Refill: 0    2. Gait instability  -     Misc. Devices (Rollator Ultra-Light) misc; Use walker with ambulation for gait instability  Dispense: 1 each; Refill: 0    3. COVID-19 virus infection        Return to Office    The patient was instructed to return  for follow-up at the next scheduled visit. The patient was instructed to return sooner if the condition changes, worsens, or does not resolve.

## 2023-02-03 NOTE — PROGRESS NOTES
Chief Complaint   Patient presents with   • Earache     Couple weeks - Had medication for this recently, LEFT        Subjective     History of Present Illness   The patient comes to clinic today with reports that she saw our PA Khoi 5/3/2019 for a fullness in her left ear.  The patient was evaluated for cerumen which was negative.  She followed up with Dr. Myers on 5/8/2019 with little change in her symptoms.  She was treated for serous otitis/eustachian tube dysfunction with Flonase, Claritin-D, Medrol Dosepak and her symptoms have improved slightly and then worsened again.  She denies any fever cough however she has had continued allergy symptoms throughout the season with large postnasal drainage.    She has had Singulair prescribed but has not been taking it at this time.      The following portions of the patient's history were reviewed and updated as appropriate: allergies, current medications, past family history, past medical history, past social history, past surgical history and problem list.    Review of Systems   Constitutional: Negative.    HENT: Positive for congestion and postnasal drip.         Fullness in left ear   Eyes: Negative.    Respiratory: Negative.    Gastrointestinal: Negative.    Neurological: Positive for headache.        Mild frontal headache   All other systems reviewed and are negative.      Objective   Physical Exam   Constitutional: She is oriented to person, place, and time. She appears well-developed and well-nourished.   HENT:   Right Ear: External ear normal.   Left Ear: External ear normal.   Nose: Nose normal.   Posterior pharynx with moderate clear postnasal drainage with cobblestoning appearance.  Left TM with trace fluid.   Eyes: Conjunctivae are normal. Right eye exhibits no discharge. Left eye exhibits no discharge.   Neck: Normal range of motion.   Cardiovascular: Normal rate and regular rhythm.   Pulmonary/Chest: Effort normal and breath sounds normal.   Abdominal:  I called the patient and told him to decrease the pm dose by 2 units. The patient verbalized understanding   Soft. Bowel sounds are normal.   Musculoskeletal: Normal range of motion.   Neurological: She is alert and oriented to person, place, and time.   Skin: Skin is warm and dry. Capillary refill takes 2 to 3 seconds.   Psychiatric: She has a normal mood and affect. Her behavior is normal.   Nursing note and vitals reviewed.            Assessment/Plan   Brandi was seen today for earache.    Diagnoses and all orders for this visit:    Acute serous otitis media of left ear, recurrence not specified  -     Ambulatory Referral to ENT (Otolaryngology)    Dysfunction of left eustachian tube  -     Ambulatory Referral to ENT (Otolaryngology)    Decreased hearing of left ear  -     Ambulatory Referral to ENT (Otolaryngology)    Other orders  -     azelastine (ASTELIN) 0.1 % nasal spray; 2 sprays into the nostril(s) as directed by provider Daily. Use in each nostril as directed    Continued serous otitis with eustachian but dysfunction.  Will add Astelin restart the Singulair and plan to see ENT for follow-up.  She will see us sooner if her symptoms are not improving or worsening at any time.      Return if symptoms worsen or fail to improve.  RTC/call  If symptoms worsen  Meds MOA and SE's reviewed and pt v/u

## 2023-02-07 RX ORDER — MONTELUKAST SODIUM 10 MG/1
TABLET ORAL
Qty: 90 TABLET | Refills: 1 | OUTPATIENT
Start: 2023-02-07

## 2023-02-13 DIAGNOSIS — M54.50 CHRONIC LOW BACK PAIN WITHOUT SCIATICA, UNSPECIFIED BACK PAIN LATERALITY: ICD-10-CM

## 2023-02-13 DIAGNOSIS — G89.29 CHRONIC LOW BACK PAIN WITHOUT SCIATICA, UNSPECIFIED BACK PAIN LATERALITY: ICD-10-CM

## 2023-02-14 RX ORDER — TRAMADOL HYDROCHLORIDE 50 MG/1
50 TABLET ORAL EVERY 6 HOURS PRN
Qty: 120 TABLET | Refills: 0 | Status: SHIPPED | OUTPATIENT
Start: 2023-02-14 | End: 2023-03-15 | Stop reason: SDUPTHER

## 2023-03-03 RX ORDER — RALOXIFENE HYDROCHLORIDE 60 MG/1
TABLET, FILM COATED ORAL
Qty: 90 TABLET | Refills: 1 | Status: SHIPPED | OUTPATIENT
Start: 2023-03-03

## 2023-03-06 ENCOUNTER — OFFICE VISIT (OUTPATIENT)
Dept: INTERNAL MEDICINE | Facility: CLINIC | Age: 78
End: 2023-03-06
Payer: MEDICARE

## 2023-03-06 VITALS
HEART RATE: 76 BPM | WEIGHT: 132 LBS | TEMPERATURE: 98.4 F | RESPIRATION RATE: 16 BRPM | DIASTOLIC BLOOD PRESSURE: 60 MMHG | BODY MASS INDEX: 25.36 KG/M2 | SYSTOLIC BLOOD PRESSURE: 108 MMHG

## 2023-03-06 DIAGNOSIS — I10 ESSENTIAL HYPERTENSION: Primary | ICD-10-CM

## 2023-03-06 DIAGNOSIS — E78.5 HYPERLIPIDEMIA, UNSPECIFIED HYPERLIPIDEMIA TYPE: ICD-10-CM

## 2023-03-06 DIAGNOSIS — K21.9 GASTROESOPHAGEAL REFLUX DISEASE WITHOUT ESOPHAGITIS: ICD-10-CM

## 2023-03-06 LAB
CHOLEST SERPL-MCNC: 198 MG/DL (ref 0–200)
HDLC SERPL-MCNC: 88 MG/DL (ref 40–60)
LDLC SERPL CALC-MCNC: 99 MG/DL (ref 0–100)
LDLC/HDLC SERPL: 1.11 {RATIO}
TRIGL SERPL-MCNC: 62 MG/DL (ref 0–150)
VLDLC SERPL-MCNC: 11 MG/DL (ref 5–40)

## 2023-03-06 PROCEDURE — 80061 LIPID PANEL: CPT | Performed by: INTERNAL MEDICINE

## 2023-03-06 PROCEDURE — 99214 OFFICE O/P EST MOD 30 MIN: CPT | Performed by: INTERNAL MEDICINE

## 2023-03-06 PROCEDURE — 36415 COLL VENOUS BLD VENIPUNCTURE: CPT | Performed by: INTERNAL MEDICINE

## 2023-03-06 PROCEDURE — 80053 COMPREHEN METABOLIC PANEL: CPT | Performed by: INTERNAL MEDICINE

## 2023-03-06 NOTE — PROGRESS NOTES
Chief Complaint   Patient presents with   • Follow-up     4 month follow up chronic medical problems       History of Present Illness      The patient presents for a follow-up related to hypertension. The patient reports that she has had no headaches, chest pain, dyspnea, edema, syncope, blurred vision or palpitations. She states that she is taking her medication as prescribed. She is not having medication side effects.    The patient presents for a follow-up related to GERD. The patient is on famotidine and pantoprazole for her gastroesophageal reflux. The medication is taken on a regular basis and gives complete relief of the symptoms. She reports no abdominal pain, belching, diarrhea, dysphagia, early satiety, heartburn, hoarseness, nausea, odynophagia, rectal bleeding, vomiting or weight loss. The GERD has no known aggravating factors.    The patient presents for a follow-up related to hyperlipidemia. She is following a low fat diet. She reports that she is exercising. She is not taking medication for hyperlipidemia. She denies orthopnea, paroxysmal nocturnal dyspnea or dyspnea on exertion.    Medications      Current Outpatient Medications:   •  acetaminophen (TYLENOL) 650 MG 8 hr tablet, Take 2 tablets by mouth Every 8 (Eight) Hours As Needed for Mild Pain., Disp: , Rfl:   •  AMITIZA 24 MCG capsule, Take 1 capsule by mouth 2 (Two) Times a Day., Disp: , Rfl:   •  B Complex Vitamins (VITAMIN B COMPLEX PO), Take 1 tablet by mouth Daily., Disp: , Rfl:   •  Calcium-Magnesium-Vitamin D (CALCIUM MAGNESIUM PO), Take  by mouth., Disp: , Rfl:   •  custom compounded cream, Apply  topically to the appropriate area as directed. CUSTOM COMPOUNDED RECTAL SUPPOSITORIES BELLADONNA/MORPHINE SULF 16.2/5MG  INSERT 1 SUPPOSITORY TWICE DAILY AS NEEDED, Disp: , Rfl:   •  custom compounded cream, Apply  topically to the appropriate area as directed. CUSTOM COMPOUNDED RECTAL SUPPOSITORY HYDROCORTISONE/LIDOCAINE 2.5% / 2% INSERT 1  SUPPOSITORY TWICE DAILY AS NEEDED, Disp: , Rfl:   •  DHA-EPA-Vitamin E (OMEGA-3 COMPLEX PO), Take 1 capsule by mouth Daily., Disp: , Rfl:   •  diazePAM (DIASTAT) 2.5 MG gel, Insert 5 mg into the rectum At Night As Needed., Disp: , Rfl:   •  diclofenac (VOLTAREN) 1 % gel gel, Apply four grams topically to affected areas four times a day as needed for pain., Disp: 100 g, Rfl: 1  •  Dietary Management Product (VASCULERA PO), Take 1 tablet by mouth 2 (Two) Times a Day., Disp: , Rfl:   •  Diphenhydramine-APAP, sleep, (TYLENOL PM EXTRA STRENGTH PO), Take 2 tablets by mouth At Night As Needed., Disp: , Rfl:   •  famotidine (PEPCID) 20 MG tablet, Take 1 tablet by mouth 2 (Two) Times a Day As Needed., Disp: , Rfl:   •  fluticasone (FLONASE) 50 MCG/ACT nasal spray, 2 sprays into the nostril(s) as directed by provider Daily. (Patient taking differently: 2 sprays into the nostril(s) as directed by provider Daily As Needed.), Disp: 16 g, Rfl: 3  •  gabapentin (NEURONTIN) 100 MG capsule, Take 1 capsule by mouth 3 (Three) Times a Day., Disp: 90 capsule, Rfl: 2  •  gabapentin (NEURONTIN) 300 MG capsule, Take 1 capsule by mouth 5 (Five) Times a Day., Disp: , Rfl:   •  Lidocaine, Anorectal, (LMX 5) 5 % cream cream, Apply  topically 4 (Four) Times a Day As Needed., Disp: , Rfl:   •  loratadine-pseudoephedrine (Claritin-D 12 Hour) 5-120 MG per 12 hr tablet, Take 1 tablet by mouth 2 (Two) Times a Day As Needed for Allergies., Disp: 60 tablet, Rfl: 2  •  meclizine (ANTIVERT) 12.5 MG tablet, Take 1 tablet by mouth Every 6 (Six) Hours As Needed., Disp: , Rfl:   •  pantoprazole (PROTONIX) 40 MG EC tablet, TAKE ONE TABLET BY MOUTH DAILY, Disp: 90 tablet, Rfl: 1  •  polyethylene glycol (MIRALAX) powder, Take 17 g by mouth Daily As Needed (constipation)., Disp: , Rfl:   •  Probiotic Product (PROBIOTIC DAILY PO), Take 1 tablet by mouth Daily., Disp: , Rfl:   •  raloxifene (EVISTA) 60 MG tablet, TAKE ONE TABLET BY MOUTH DAILY, Disp: 90 tablet,  Rfl: 1  •  Simethicone (GAS-X PO), Take  by mouth., Disp: , Rfl:   •  traMADol (ULTRAM) 50 MG tablet, Take 1 tablet by mouth Every 6 (Six) Hours As Needed for Moderate Pain., Disp: 120 tablet, Rfl: 0  •  triamterene-hydrochlorothiazide (DYAZIDE) 37.5-25 MG per capsule, Take 1 capsule by mouth Daily., Disp: , Rfl:   •  VITAMIN D, CHOLECALCIFEROL, PO, Take  by mouth., Disp: , Rfl:   •  Wheat Dextrin (BENEFIBER DRINK MIX PO), Take  by mouth., Disp: , Rfl:      Allergies    Allergies   Allergen Reactions   • Motrin [Ibuprofen] Nausea Only       Problem List    Patient Active Problem List   Diagnosis   • Rectal pain   • S/P hemorrhoidectomy 11/1/16   • Urinary retention   • Acute urinary retention   • Chronic low back pain   • Osteopenia of neck of femur       Medications, Allergies, Problems List and Past History were reviewed and updated.    Physical Examination    /60 (BP Location: Right arm, Patient Position: Sitting, Cuff Size: Adult)   Pulse 76   Temp 98.4 °F (36.9 °C) (Infrared)   Resp 16   Wt 59.9 kg (132 lb)   LMP  (LMP Unknown)   BMI 25.36 kg/m²     HEENT: Head- Normocephalic Atraumatic. Facies- Within normal limits. Pinnas- Normal texture and shape bilaterally. Canals- Normal bilaterally. TMs- Normal bilaterally. Nares- Patent bilaterally. Nasal Septum- is normal. There is no tenderness to palpation over the frontal or maxillary sinuses. Lids- Normal bilaterally. Conjunctiva- Clear bilaterally. Sclera- Anicteric bilaterally. Oropharynx- Moist with no lesions. Tonsils- No enlargement, erythema or exudate.    Neck: Thyroid- non enlarged, symmetric and has no nodules. No bruits are detected. ROM- Normal Range of Motion with no rigidity.    Lungs: Auscultation- Clear to auscultation bilaterally. There are no retractions, clubbing or cyanosis. The Expiratory to Inspiratory ratio is equal.    Cardiovascular: There are no carotid bruits. Heart- Normal Rate with Regular rhythm and no murmurs. There are no  gallops. There are no rubs. In the lower extremities there is no edema. The upper extremities do not have edema.    Abdomen: Soft, benign, non-tender with no masses, hernias, organomegaly or scars.    Impression and Assessment    Essential Hypertension.    Gastroesophageal Reflux Disease.    Hyperlipidemia.    Plan    Gastroesophageal Reflux Disease Plan: The patient was instructed to continue the current medications.    Essential Hypertension Plan: The patient was instructed to continue the current medications.    Hyperlipidemia Plan: The patient was instructed to exercise daily and eat a low fat diet.    Diagnoses and all orders for this visit:    1. Essential hypertension (Primary)  -     Comprehensive Metabolic Panel; Future  -     Comprehensive Metabolic Panel    2. Gastroesophageal reflux disease without esophagitis    3. Hyperlipidemia, unspecified hyperlipidemia type  -     Comprehensive Metabolic Panel; Future  -     Lipid Panel; Future  -     Comprehensive Metabolic Panel  -     Lipid Panel        Return to Office    The patient was instructed to return for follow-up in 6 months. The patient was instructed to return sooner if the condition changes, worsens, or does not resolve.

## 2023-03-07 LAB
ALBUMIN SERPL-MCNC: 4.4 G/DL (ref 3.5–5.2)
ALBUMIN/GLOB SERPL: 1.6 G/DL
ALP SERPL-CCNC: 45 U/L (ref 39–117)
ALT SERPL W P-5'-P-CCNC: 13 U/L (ref 1–33)
ANION GAP SERPL CALCULATED.3IONS-SCNC: 8.6 MMOL/L (ref 5–15)
AST SERPL-CCNC: 25 U/L (ref 1–32)
BILIRUB SERPL-MCNC: 0.2 MG/DL (ref 0–1.2)
BUN SERPL-MCNC: 18 MG/DL (ref 8–23)
BUN/CREAT SERPL: 22 (ref 7–25)
CALCIUM SPEC-SCNC: 9.4 MG/DL (ref 8.6–10.5)
CHLORIDE SERPL-SCNC: 99 MMOL/L (ref 98–107)
CO2 SERPL-SCNC: 27.4 MMOL/L (ref 22–29)
CREAT SERPL-MCNC: 0.82 MG/DL (ref 0.57–1)
EGFRCR SERPLBLD CKD-EPI 2021: 73.8 ML/MIN/1.73
GLOBULIN UR ELPH-MCNC: 2.8 GM/DL
GLUCOSE SERPL-MCNC: 97 MG/DL (ref 65–99)
POTASSIUM SERPL-SCNC: 4.8 MMOL/L (ref 3.5–5.2)
PROT SERPL-MCNC: 7.2 G/DL (ref 6–8.5)
SODIUM SERPL-SCNC: 135 MMOL/L (ref 136–145)

## 2023-03-15 DIAGNOSIS — G89.29 CHRONIC LOW BACK PAIN WITHOUT SCIATICA, UNSPECIFIED BACK PAIN LATERALITY: ICD-10-CM

## 2023-03-15 DIAGNOSIS — M54.50 CHRONIC LOW BACK PAIN WITHOUT SCIATICA, UNSPECIFIED BACK PAIN LATERALITY: ICD-10-CM

## 2023-03-16 RX ORDER — TRAMADOL HYDROCHLORIDE 50 MG/1
50 TABLET ORAL EVERY 6 HOURS PRN
Qty: 120 TABLET | Refills: 0 | Status: SHIPPED | OUTPATIENT
Start: 2023-03-16

## 2023-03-20 ENCOUNTER — TELEPHONE (OUTPATIENT)
Dept: INTERNAL MEDICINE | Facility: CLINIC | Age: 78
End: 2023-03-20
Payer: MEDICARE

## 2023-03-20 DIAGNOSIS — M54.50 CHRONIC LOW BACK PAIN WITHOUT SCIATICA, UNSPECIFIED BACK PAIN LATERALITY: Primary | ICD-10-CM

## 2023-03-20 DIAGNOSIS — G89.29 CHRONIC LOW BACK PAIN WITHOUT SCIATICA, UNSPECIFIED BACK PAIN LATERALITY: Primary | ICD-10-CM

## 2023-03-20 DIAGNOSIS — G89.29 CHRONIC PAIN OF LEFT KNEE: ICD-10-CM

## 2023-03-20 DIAGNOSIS — M25.562 CHRONIC PAIN OF LEFT KNEE: ICD-10-CM

## 2023-03-20 DIAGNOSIS — R26.81 GAIT INSTABILITY: ICD-10-CM

## 2023-03-20 DIAGNOSIS — M25.551 RIGHT HIP PAIN: ICD-10-CM

## 2023-03-20 NOTE — TELEPHONE ENCOUNTER
"Caller: Brandi Duncan    Relationship: Self    Best call back number: [phone]    What form or medical record are you requesting: WRITTEN PRESCRIPTION FOR ROLLATOR WALKER WITH 8\" WHEELS    Who is requesting this form or medical record from you: PATIENT    How would you like to receive the form or medical records (pick-up, mail, fax):  OR MAIL IS OK   If pick-up, provide patient with address and location details    Timeframe paperwork needed:AS SOON AS POSSIBLE     Additional notes:     PATIENT STATES THAT AFTER DOING SOME RESEARCH, THE ORIGINAL PRESCRIPTION WRITTEN ON 1/19/23 FOR A ROLLATOR WALKER ULTRA LIGHT WITH 6\" WHEELS WILL NOT WORK FOR HER NEEDS AND SHE IS REQUESTING A NEW PRESCRIPTION BE WRITTEN FOR A ROLLATOR WALKER WITH 8\" WHEELS.     PLEASE ADVISE PATIENT IF THERE ARE ANY ISSUES/CONCERNS OR WHEN THIS PRESCRIPTION IS READY FOR THE PATIENT TO .       "

## 2023-03-21 RX ORDER — CALCIUM CARBONATE 160(400)MG
TABLET,CHEWABLE ORAL
Qty: 1 EACH | Refills: 0 | Status: SHIPPED | OUTPATIENT
Start: 2023-03-21

## 2023-03-21 NOTE — TELEPHONE ENCOUNTER
Left message voice mail for patient to please return call.  Ofc. # given.     HUB:  Please inform that order is ready.  Need to know if she wants to  order here or if she wants order mailed to her home address.    Please document.

## 2023-03-22 ENCOUNTER — TELEPHONE (OUTPATIENT)
Dept: INTERNAL MEDICINE | Facility: CLINIC | Age: 78
End: 2023-03-22

## 2023-03-22 NOTE — TELEPHONE ENCOUNTER
Caller: [facility]     Best call back number:  [phone]    What specialty or service is being requested: PHYSICAL THERAPY     Any additional details: REQUESTING SIGNED  PLAN OF CARE FOR  DATES   10-4-22 AND 3-2-23    PLEASE -[phone]

## 2023-04-14 ENCOUNTER — TRANSCRIBE ORDERS (OUTPATIENT)
Dept: ADMINISTRATIVE | Facility: HOSPITAL | Age: 78
End: 2023-04-14
Payer: MEDICARE

## 2023-04-14 DIAGNOSIS — M54.50 CHRONIC LOW BACK PAIN WITHOUT SCIATICA, UNSPECIFIED BACK PAIN LATERALITY: ICD-10-CM

## 2023-04-14 DIAGNOSIS — Z12.31 SCREENING MAMMOGRAM FOR BREAST CANCER: Primary | ICD-10-CM

## 2023-04-14 DIAGNOSIS — G89.29 CHRONIC LOW BACK PAIN WITHOUT SCIATICA, UNSPECIFIED BACK PAIN LATERALITY: ICD-10-CM

## 2023-04-17 RX ORDER — TRAMADOL HYDROCHLORIDE 50 MG/1
50 TABLET ORAL EVERY 6 HOURS PRN
Qty: 120 TABLET | Refills: 0 | Status: SHIPPED | OUTPATIENT
Start: 2023-04-17

## 2023-04-24 ENCOUNTER — TELEPHONE (OUTPATIENT)
Dept: INTERNAL MEDICINE | Facility: CLINIC | Age: 78
End: 2023-04-24
Payer: MEDICARE

## 2023-04-24 RX ORDER — PANTOPRAZOLE SODIUM 40 MG/1
40 TABLET, DELAYED RELEASE ORAL DAILY
Qty: 90 TABLET | Refills: 1 | Status: SHIPPED | OUTPATIENT
Start: 2023-04-24

## 2023-04-24 RX ORDER — PANTOPRAZOLE SODIUM 40 MG/1
TABLET, DELAYED RELEASE ORAL
Qty: 90 TABLET | Refills: 1 | Status: SHIPPED | OUTPATIENT
Start: 2023-04-24 | End: 2023-04-24 | Stop reason: SDUPTHER

## 2023-04-25 ENCOUNTER — TELEPHONE (OUTPATIENT)
Dept: INTERNAL MEDICINE | Facility: CLINIC | Age: 78
End: 2023-04-25

## 2023-04-25 DIAGNOSIS — Z78.0 POSTMENOPAUSAL: Primary | ICD-10-CM

## 2023-04-25 NOTE — TELEPHONE ENCOUNTER
Caller: Brandi Duncan    Relationship: Self    Best call back number: [phone]    What orders are you requesting (i.e. lab or imaging): DEXA  SCAN    In what timeframe would the patient need to come in: ASAP    Where will you receive your lab/imaging services: [facility]     Additional notes:

## 2023-05-18 DIAGNOSIS — M54.50 CHRONIC LOW BACK PAIN WITHOUT SCIATICA, UNSPECIFIED BACK PAIN LATERALITY: ICD-10-CM

## 2023-05-18 DIAGNOSIS — G89.29 CHRONIC LOW BACK PAIN WITHOUT SCIATICA, UNSPECIFIED BACK PAIN LATERALITY: ICD-10-CM

## 2023-05-18 RX ORDER — TRAMADOL HYDROCHLORIDE 50 MG/1
50 TABLET ORAL EVERY 6 HOURS PRN
Qty: 120 TABLET | Refills: 0 | Status: SHIPPED | OUTPATIENT
Start: 2023-05-18

## 2023-06-12 ENCOUNTER — OFFICE VISIT (OUTPATIENT)
Dept: INTERNAL MEDICINE | Facility: CLINIC | Age: 78
End: 2023-06-12
Payer: MEDICARE

## 2023-06-12 VITALS
BODY MASS INDEX: 25.26 KG/M2 | SYSTOLIC BLOOD PRESSURE: 112 MMHG | RESPIRATION RATE: 20 BRPM | HEART RATE: 74 BPM | WEIGHT: 133.8 LBS | TEMPERATURE: 98 F | HEIGHT: 61 IN | DIASTOLIC BLOOD PRESSURE: 78 MMHG

## 2023-06-12 DIAGNOSIS — J30.89 NON-SEASONAL ALLERGIC RHINITIS DUE TO OTHER ALLERGIC TRIGGER: Primary | ICD-10-CM

## 2023-06-12 DIAGNOSIS — J34.89 RHINORRHEA: ICD-10-CM

## 2023-06-12 LAB
EXPIRATION DATE: NORMAL
INTERNAL CONTROL: NORMAL
Lab: NORMAL
SARS-COV-2 AG UPPER RESP QL IA.RAPID: NOT DETECTED

## 2023-06-12 PROCEDURE — 99213 OFFICE O/P EST LOW 20 MIN: CPT | Performed by: NURSE PRACTITIONER

## 2023-06-12 PROCEDURE — 87426 SARSCOV CORONAVIRUS AG IA: CPT | Performed by: NURSE PRACTITIONER

## 2023-06-12 RX ORDER — MONTELUKAST SODIUM 10 MG/1
10 TABLET ORAL NIGHTLY
Qty: 90 TABLET | Refills: 1 | Status: SHIPPED | OUTPATIENT
Start: 2023-06-12

## 2023-06-12 NOTE — PROGRESS NOTES
"Patient Name: Brandi Duncan  : 1945   MRN: [id number]     Chief Complaint:    Chief Complaint   Patient presents with    Cough     Cough and congestion x 5 days. Has a history of bad allergies. She stopped taking her singulair 3-4 months ago.        History of Present Illness:   Past Medical History:   Diagnosis Date    Anemia     Arthritis     Cancer     Carpal tunnel syndrome     Cataract 2019    Chronic idiopathic constipation     Colon polyp     GERD (gastroesophageal reflux disease)     Heart murmur 2019    Hemorrhoid     History of medical problems 2018    Neuropathy    HL (hearing loss) 2019    left ear    Low back pain     Meniere's disease     left ear    Osteopenia     Osteoporosis     Ovarian cancer     Peripheral neuropathy     Scoliosis     Urinary retention      Brandi Duncan is a 77 y.o. female who presents to the clinic for rhinorrhea and allergic rhinitis.      The patient describes experiencing a cough and drainage for 4 to 5 days. She states about 2 to 3 weeks ago she was experiencing the same symptoms. She reports when she has drainage stuck in her throat, she will experience a \"funny sound\" when she breaths. She reports she is coughing up phlegm. She states she is experiencing a sore throat. She adds her symptoms worsen when she is outside. She states she used to experience these symptoms twice a year; however, she notes when she was taking montelukast she did not get the symptoms. She denies sinus pressure, shortness of breath, and ear pain. She notes no one else around is sick or has been sick. She reports she takes Claritin-D in the morning, Flonase at night, and Tylenol PM at night.      She inquires if she should take the montelukast all the time.   Subjective     Review of System: Review of Systems   A review of systems was performed, and the pertinent positives are noted in the HPI.    Medications:     Current Outpatient Medications:     acetaminophen (TYLENOL) 650 MG 8 " hr tablet, Take 2 tablets by mouth Every 8 (Eight) Hours As Needed for Mild Pain., Disp: , Rfl:     AMITIZA 24 MCG capsule, Take 1 capsule by mouth 2 (Two) Times a Day., Disp: , Rfl:     B Complex Vitamins (VITAMIN B COMPLEX PO), Take 1 tablet by mouth Daily., Disp: , Rfl:     Calcium-Magnesium-Vitamin D (CALCIUM MAGNESIUM PO), Take  by mouth., Disp: , Rfl:     custom compounded cream, Apply  topically to the appropriate area as directed. CUSTOM COMPOUNDED RECTAL SUPPOSITORIES BELLADONNA/MORPHINE SULF 16.2/5MG  INSERT 1 SUPPOSITORY TWICE DAILY AS NEEDED, Disp: , Rfl:     custom compounded cream, Apply  topically to the appropriate area as directed. CUSTOM COMPOUNDED RECTAL SUPPOSITORY HYDROCORTISONE/LIDOCAINE 2.5% / 2% INSERT 1 SUPPOSITORY TWICE DAILY AS NEEDED, Disp: , Rfl:     DHA-EPA-Vitamin E (OMEGA-3 COMPLEX PO), Take 1 capsule by mouth Daily., Disp: , Rfl:     diazePAM (DIASTAT) 2.5 MG gel, Insert 5 mg into the rectum At Night As Needed., Disp: , Rfl:     diclofenac (VOLTAREN) 1 % gel gel, Apply four grams topically to affected areas four times a day as needed for pain., Disp: 100 g, Rfl: 1    Dietary Management Product (VASCULERA PO), Take 1 tablet by mouth 2 (Two) Times a Day., Disp: , Rfl:     Diphenhydramine-APAP, sleep, (TYLENOL PM EXTRA STRENGTH PO), Take 2 tablets by mouth At Night As Needed., Disp: , Rfl:     famotidine (PEPCID) 20 MG tablet, Take 1 tablet by mouth 2 (Two) Times a Day As Needed., Disp: , Rfl:     fluticasone (FLONASE) 50 MCG/ACT nasal spray, 2 sprays into the nostril(s) as directed by provider Daily. (Patient taking differently: 2 sprays into the nostril(s) as directed by provider Daily As Needed.), Disp: 16 g, Rfl: 3    gabapentin (NEURONTIN) 100 MG capsule, Take 1 capsule by mouth 3 (Three) Times a Day., Disp: 90 capsule, Rfl: 2    gabapentin (NEURONTIN) 300 MG capsule, Take 1 capsule by mouth 5 (Five) Times a Day., Disp: , Rfl:     Lidocaine, Anorectal, (LMX 5) 5 % cream cream,  "Apply  topically 4 (Four) Times a Day As Needed., Disp: , Rfl:     meclizine (ANTIVERT) 12.5 MG tablet, Take 1 tablet by mouth Every 6 (Six) Hours As Needed., Disp: , Rfl:     Misc. Devices (Rollator Ultra-Light) misc, Use walker with ambulation for gait instability, Disp: 1 each, Rfl: 0    pantoprazole (PROTONIX) 40 MG EC tablet, Take 1 tablet by mouth Daily., Disp: 90 tablet, Rfl: 1    polyethylene glycol (MIRALAX) powder, Take 17 g by mouth Daily As Needed (constipation)., Disp: , Rfl:     Probiotic Product (PROBIOTIC DAILY PO), Take 1 tablet by mouth Daily., Disp: , Rfl:     raloxifene (EVISTA) 60 MG tablet, TAKE ONE TABLET BY MOUTH DAILY, Disp: 90 tablet, Rfl: 1    Simethicone (GAS-X PO), Take  by mouth., Disp: , Rfl:     traMADol (ULTRAM) 50 MG tablet, Take 1 tablet by mouth Every 6 (Six) Hours As Needed for Moderate Pain., Disp: 120 tablet, Rfl: 0    triamterene-hydrochlorothiazide (DYAZIDE) 37.5-25 MG per capsule, Take 1 capsule by mouth Daily., Disp: , Rfl:     VITAMIN D, CHOLECALCIFEROL, PO, Take  by mouth., Disp: , Rfl:     Wheat Dextrin (BENEFIBER DRINK MIX PO), Take  by mouth., Disp: , Rfl:     montelukast (Singulair) 10 MG tablet, Take 1 tablet by mouth Every Night., Disp: 90 tablet, Rfl: 1    Allergies:   Allergies   Allergen Reactions    Motrin [Ibuprofen] Nausea Only       Objective     Physical Exam:   Vital Signs:   Vitals:    06/12/23 1437   BP: 112/78   BP Location: Right arm   Patient Position: Sitting   Cuff Size: Adult   Pulse: 74   Resp: 20   Temp: 98 °F (36.7 °C)   TempSrc: Infrared   Weight: 60.7 kg (133 lb 12.8 oz)   Height: 153.7 cm (60.5\")          Physical Exam  Constitutional:       General: She is not in acute distress.  HENT:      Head: Normocephalic.      Right Ear: Hearing and tympanic membrane normal.      Left Ear: Tympanic membrane normal.      Ears:      Comments: Bilateral ear canals without cerumen; bilateral TMs intact and pearly gray; no tragal tenderness bilaterally      " Nose: Rhinorrhea present. No septal deviation, nasal tenderness, mucosal edema or congestion.      Right Sinus: No maxillary sinus tenderness or frontal sinus tenderness.      Left Sinus: No maxillary sinus tenderness or frontal sinus tenderness.      Comments: No sinus tenderness.      Mouth/Throat:      Mouth: Mucous membranes are moist.      Pharynx: Oropharynx is clear. No oropharyngeal exudate or posterior oropharyngeal erythema.      Tonsils: No tonsillar abscesses. 0 on the right. 0 on the left.   Eyes:      Extraocular Movements: Extraocular movements intact.      Conjunctiva/sclera:      Right eye: Right conjunctiva is not injected. No exudate.     Left eye: Left conjunctiva is not injected. No exudate.     Pupils: Pupils are equal, round, and reactive to light.   Cardiovascular:      Rate and Rhythm: Normal rate and regular rhythm.      Heart sounds: No murmur heard.    No friction rub. No gallop.   Pulmonary:      Breath sounds: No wheezing, rhonchi or rales.   Abdominal:      General: Bowel sounds are normal.      Palpations: There is no mass.   Musculoskeletal:         General: Normal range of motion.      Cervical back: Normal range of motion.   Lymphadenopathy:      Cervical: No cervical adenopathy.   Skin:     General: Skin is warm and dry.      Findings: No erythema or rash.   Neurological:      General: No focal deficit present.      Mental Status: She is alert and oriented to person, place, and time.   Psychiatric:      Comments: Cooperative and friendly; no depressed or anxious mood; normal thought content , memory and cognition.      Assessment / Plan      Assessment/Plan:   Diagnoses and all orders for this visit:    1. Non-seasonal allergic rhinitis due to other allergic trigger (Primary)  -     montelukast (Singulair) 10 MG tablet; Take 1 tablet by mouth Every Night.  Dispense: 90 tablet; Refill: 1    2. Rhinorrhea  -     POCT VERITOR SARS-CoV-2 Antigen     1. Non-seasonal allergic rhinitis  due to other allergic trigger   Prescribed the patient montelukast (Singulair) 10 MG tablet, encouraged the patient to take it nightly. Advised the patient to let me know if her symptoms do not improve.      2.Rhinorrhea  The patient completed a COVID-19 Antigen rapid test today. Her result was negative.     Explained and discussed patient's condition and plan of care. Discussed when to follow-up. Discussed possible red flags and how to follow-up with those. Viewed patient's medications and discussed common side effects. Patient to continue current medications as advised.  Be compliant with medications. Patient to let me know if they have any worsening, no improvement, do not tolerate medication, or any future concerns about treatment. ER for emergencies.  Patient verbalized an understanding and agreement with plan of care.     Follow Up:   ALIN Gardner  [facility]            Transcribed from ambient dictation for ALIN Tracy by Carlos Peace.  06/12/23   16:15 EDT    Patient or patient representative verbalized consent to the visit recording.  I have personally performed the services described in this document as transcribed by the above individual, and it is both accurate and complete.

## 2023-06-15 ENCOUNTER — OFFICE VISIT (OUTPATIENT)
Dept: INTERNAL MEDICINE | Facility: CLINIC | Age: 78
End: 2023-06-15
Payer: MEDICARE

## 2023-06-15 VITALS
TEMPERATURE: 98 F | WEIGHT: 132.25 LBS | HEART RATE: 78 BPM | SYSTOLIC BLOOD PRESSURE: 130 MMHG | RESPIRATION RATE: 20 BRPM | BODY MASS INDEX: 25.4 KG/M2 | DIASTOLIC BLOOD PRESSURE: 70 MMHG

## 2023-06-15 DIAGNOSIS — R05.1 ACUTE COUGH: Primary | ICD-10-CM

## 2023-06-15 DIAGNOSIS — J20.9 ACUTE BRONCHITIS, UNSPECIFIED ORGANISM: ICD-10-CM

## 2023-06-15 PROCEDURE — 1160F RVW MEDS BY RX/DR IN RCRD: CPT | Performed by: STUDENT IN AN ORGANIZED HEALTH CARE EDUCATION/TRAINING PROGRAM

## 2023-06-15 PROCEDURE — 99213 OFFICE O/P EST LOW 20 MIN: CPT | Performed by: STUDENT IN AN ORGANIZED HEALTH CARE EDUCATION/TRAINING PROGRAM

## 2023-06-15 PROCEDURE — 1159F MED LIST DOCD IN RCRD: CPT | Performed by: STUDENT IN AN ORGANIZED HEALTH CARE EDUCATION/TRAINING PROGRAM

## 2023-06-15 RX ORDER — AZITHROMYCIN 250 MG/1
TABLET, FILM COATED ORAL
Qty: 6 TABLET | Refills: 0 | Status: SHIPPED | OUTPATIENT
Start: 2023-06-15

## 2023-06-15 RX ORDER — METHYLPREDNISOLONE 4 MG/1
TABLET ORAL
Qty: 1 EACH | Refills: 0 | Status: SHIPPED | OUTPATIENT
Start: 2023-06-15

## 2023-06-15 NOTE — PROGRESS NOTES
Follow Up Office Visit      Date: 06/15/2023   Patient Name: Brandi Duncan  : 1945   MRN: [id number]     Chief Complaint:    Chief Complaint   Patient presents with    Wheezing    Cough    sinus drainage       History of Present Illness: Brandi Duncan is a 77 y.o. female who is here today for cough and congestion.    The patient is here today for cough and congestion. She has agreed to use [facility] for today's visit.      Cough   The patient reports cough, onset 1 week. She notes she has taken OTC Mucinex, with minimal relief. She adds she has taken Singulair, Flonase, and Claritin-D. She notes she has attempted to sleep in a chair, with no relief. She denies fever and chills. The patient reports severe seasonal allergies. She denies being around anyone who is ill. She notes production from cough being thick and yellow in color. She denies pain with breathing, or shortness of breath.     Seen in clinic on 23 with rhinorrhea and cough  Taking Claritin-D in the morning, Flonase at night, and Tylenol PM at night.   Negative covid testing at that time  Started singulair 10mg nightly.      Subjective      Review of Systems:   Review of Systems   Constitutional:  Negative for fever.   Respiratory:  Positive for cough. Negative for shortness of breath.      I have reviewed the patients family history, social history, past medical history, past surgical history and have updated it as appropriate.     Medications:     Current Outpatient Medications:     acetaminophen (TYLENOL) 650 MG 8 hr tablet, Take 2 tablets by mouth Every 8 (Eight) Hours As Needed for Mild Pain., Disp: , Rfl:     AMITIZA 24 MCG capsule, Take 1 capsule by mouth 2 (Two) Times a Day., Disp: , Rfl:     B Complex Vitamins (VITAMIN B COMPLEX PO), Take 1 tablet by mouth Daily., Disp: , Rfl:     Calcium-Magnesium-Vitamin D (CALCIUM MAGNESIUM PO), Take  by mouth., Disp: , Rfl:     custom compounded cream, Apply  topically to the appropriate area as  directed. CUSTOM COMPOUNDED RECTAL SUPPOSITORIES BELLADONNA/MORPHINE SULF 16.2/5MG  INSERT 1 SUPPOSITORY TWICE DAILY AS NEEDED, Disp: , Rfl:     custom compounded cream, Apply  topically to the appropriate area as directed. CUSTOM COMPOUNDED RECTAL SUPPOSITORY HYDROCORTISONE/LIDOCAINE 2.5% / 2% INSERT 1 SUPPOSITORY TWICE DAILY AS NEEDED, Disp: , Rfl:     DHA-EPA-Vitamin E (OMEGA-3 COMPLEX PO), Take 1 capsule by mouth Daily., Disp: , Rfl:     diazePAM (DIASTAT) 2.5 MG gel, Insert 5 mg into the rectum At Night As Needed., Disp: , Rfl:     diclofenac (VOLTAREN) 1 % gel gel, Apply four grams topically to affected areas four times a day as needed for pain., Disp: 100 g, Rfl: 1    Dietary Management Product (VASCULERA PO), Take 1 tablet by mouth 2 (Two) Times a Day., Disp: , Rfl:     Diphenhydramine-APAP, sleep, (TYLENOL PM EXTRA STRENGTH PO), Take 2 tablets by mouth At Night As Needed., Disp: , Rfl:     famotidine (PEPCID) 20 MG tablet, Take 1 tablet by mouth 2 (Two) Times a Day As Needed., Disp: , Rfl:     fluticasone (FLONASE) 50 MCG/ACT nasal spray, 2 sprays into the nostril(s) as directed by provider Daily. (Patient taking differently: 2 sprays into the nostril(s) as directed by provider Daily As Needed.), Disp: 16 g, Rfl: 3    gabapentin (NEURONTIN) 100 MG capsule, Take 1 capsule by mouth 3 (Three) Times a Day., Disp: 90 capsule, Rfl: 2    gabapentin (NEURONTIN) 300 MG capsule, Take 1 capsule by mouth 5 (Five) Times a Day., Disp: , Rfl:     Lidocaine, Anorectal, (LMX 5) 5 % cream cream, Apply  topically 4 (Four) Times a Day As Needed., Disp: , Rfl:     meclizine (ANTIVERT) 12.5 MG tablet, Take 1 tablet by mouth Every 6 (Six) Hours As Needed., Disp: , Rfl:     Misc. Devices (Rollator Ultra-Light) misc, Use walker with ambulation for gait instability, Disp: 1 each, Rfl: 0    montelukast (Singulair) 10 MG tablet, Take 1 tablet by mouth Every Night., Disp: 90 tablet, Rfl: 1    pantoprazole (PROTONIX) 40 MG EC tablet,  Take 1 tablet by mouth Daily., Disp: 90 tablet, Rfl: 1    polyethylene glycol (MIRALAX) powder, Take 17 g by mouth Daily As Needed (constipation)., Disp: , Rfl:     Probiotic Product (PROBIOTIC DAILY PO), Take 1 tablet by mouth Daily., Disp: , Rfl:     raloxifene (EVISTA) 60 MG tablet, TAKE ONE TABLET BY MOUTH DAILY, Disp: 90 tablet, Rfl: 1    Simethicone (GAS-X PO), Take  by mouth., Disp: , Rfl:     traMADol (ULTRAM) 50 MG tablet, Take 1 tablet by mouth Every 6 (Six) Hours As Needed for Moderate Pain., Disp: 120 tablet, Rfl: 0    triamterene-hydrochlorothiazide (DYAZIDE) 37.5-25 MG per capsule, Take 1 capsule by mouth Daily., Disp: , Rfl:     VITAMIN D, CHOLECALCIFEROL, PO, Take  by mouth., Disp: , Rfl:     Wheat Dextrin (BENEFIBER DRINK MIX PO), Take  by mouth., Disp: , Rfl:     Allergies:   Allergies   Allergen Reactions    Motrin [Ibuprofen] Nausea Only       Objective     Physical Exam: Please see above  Vital Signs:   Vitals:    06/15/23 1541   BP: 130/70   BP Location: Left arm   Patient Position: Sitting   Cuff Size: Adult   Pulse: 78   Resp: 20   Temp: 98 °F (36.7 °C)   TempSrc: Temporal   Weight: 60 kg (132 lb 4 oz)   PainSc: 0-No pain     Body mass index is 25.4 kg/m².    Physical Exam  Vitals reviewed.   Constitutional:       General: She is not in acute distress.     Appearance: Normal appearance. She is not ill-appearing or toxic-appearing.   HENT:      Head: Normocephalic and atraumatic.      Right Ear: Tympanic membrane and external ear normal.      Left Ear: Tympanic membrane and external ear normal.      Nose: Nose normal. Congestion and rhinorrhea present.      Mouth/Throat:      Mouth: Mucous membranes are moist.      Pharynx: No oropharyngeal exudate or posterior oropharyngeal erythema.      Comments: Post nasal drip  Eyes:      General: No scleral icterus.     Extraocular Movements: Extraocular movements intact.      Pupils: Pupils are equal, round, and reactive to light.   Cardiovascular:       Rate and Rhythm: Normal rate and regular rhythm.      Pulses: Normal pulses.      Heart sounds: Normal heart sounds. No murmur heard.    No friction rub. No gallop.   Pulmonary:      Effort: Pulmonary effort is normal. No respiratory distress.      Breath sounds: Normal breath sounds. No stridor. No wheezing, rhonchi or rales.      Comments: Intermittent cough on exam, minimal production  Abdominal:      General: Abdomen is flat. There is no distension.   Musculoskeletal:         General: Normal range of motion.      Cervical back: Normal range of motion. No rigidity or tenderness.   Lymphadenopathy:      Cervical: No cervical adenopathy.   Skin:     General: Skin is warm and dry.      Capillary Refill: Capillary refill takes less than 2 seconds.   Neurological:      General: No focal deficit present.      Mental Status: She is alert and oriented to person, place, and time.   Psychiatric:         Mood and Affect: Mood normal.         Behavior: Behavior normal.         Judgment: Judgment normal.       Procedures    Results:   Labs:   TSH   Date Value Ref Range Status   11/15/2022 3.720 0.270 - 4.200 uIU/mL Final        Imaging:   No valid procedures specified.     Assessment / Plan      Assessment/Plan:   Problem List Items Addressed This Visit    None  Visit Diagnoses       Acute cough    -  Primary    Acute bronchitis, unspecified organism        Relevant Medications    azithromycin (Zithromax Z-Ralph) 250 MG tablet    methylPREDNISolone (MEDROL) 4 MG dose pack          - Patient was advised to take OTC Robitussin, Claritin-D, and Flonase. I will prescribe a Z-Ralph.   - discussed red flag symptoms and indications to seek emergency care or return to clinic.    Transcribed from ambient dictation for Naresh Carrillo MD by Ivon Tejada.  06/15/23   16:37 EDT    Patient or patient representative verbalized consent to the visit recording.  I have personally performed the services described in this document as transcribed  by the above individual, and it is both accurate and complete.      Follow Up:   Return if symptoms worsen or fail to improve.      Naresh Carrlilo MD  [facility] [place]

## 2023-06-19 DIAGNOSIS — G89.29 CHRONIC LOW BACK PAIN WITHOUT SCIATICA, UNSPECIFIED BACK PAIN LATERALITY: ICD-10-CM

## 2023-06-19 DIAGNOSIS — M54.50 CHRONIC LOW BACK PAIN WITHOUT SCIATICA, UNSPECIFIED BACK PAIN LATERALITY: ICD-10-CM

## 2023-06-19 RX ORDER — TRAMADOL HYDROCHLORIDE 50 MG/1
50 TABLET ORAL EVERY 6 HOURS PRN
Qty: 120 TABLET | Refills: 0 | Status: SHIPPED | OUTPATIENT
Start: 2023-06-19

## 2023-06-19 NOTE — TELEPHONE ENCOUNTER
Last office visit (LOV) for chronic conditions 06/12/23  Next office visit (NOV) 09/07/23  Urine drug screen (UDS) 03/02/20  Controlled substance agreement (CSA) 03/02/20

## 2023-07-31 ENCOUNTER — TELEPHONE (OUTPATIENT)
Dept: INTERNAL MEDICINE | Facility: CLINIC | Age: 78
End: 2023-07-31
Payer: MEDICARE

## 2023-08-01 ENCOUNTER — OFFICE VISIT (OUTPATIENT)
Dept: INTERNAL MEDICINE | Facility: CLINIC | Age: 78
End: 2023-08-01
Payer: MEDICARE

## 2023-08-01 VITALS
DIASTOLIC BLOOD PRESSURE: 70 MMHG | RESPIRATION RATE: 18 BRPM | TEMPERATURE: 98 F | BODY MASS INDEX: 25.55 KG/M2 | WEIGHT: 133 LBS | SYSTOLIC BLOOD PRESSURE: 128 MMHG | HEART RATE: 80 BPM

## 2023-08-01 DIAGNOSIS — G89.29 CHRONIC LOW BACK PAIN WITHOUT SCIATICA, UNSPECIFIED BACK PAIN LATERALITY: ICD-10-CM

## 2023-08-01 DIAGNOSIS — I10 ESSENTIAL HYPERTENSION: Primary | ICD-10-CM

## 2023-08-01 DIAGNOSIS — M54.50 CHRONIC LOW BACK PAIN WITHOUT SCIATICA, UNSPECIFIED BACK PAIN LATERALITY: ICD-10-CM

## 2023-08-01 DIAGNOSIS — E78.5 HYPERLIPIDEMIA, UNSPECIFIED HYPERLIPIDEMIA TYPE: ICD-10-CM

## 2023-08-01 DIAGNOSIS — K21.9 GASTROESOPHAGEAL REFLUX DISEASE WITHOUT ESOPHAGITIS: ICD-10-CM

## 2023-08-01 DIAGNOSIS — L71.9 ROSACEA: ICD-10-CM

## 2023-08-01 LAB
BILIRUB BLD-MCNC: NEGATIVE MG/DL
CHOLEST SERPL-MCNC: 205 MG/DL (ref 0–200)
CLARITY, POC: CLEAR
COLOR UR: YELLOW
EXPIRATION DATE: ABNORMAL
GLUCOSE UR STRIP-MCNC: NEGATIVE MG/DL
HDLC SERPL-MCNC: 89 MG/DL (ref 40–60)
KETONES UR QL: ABNORMAL
LDLC SERPL CALC-MCNC: 105 MG/DL (ref 0–100)
LDLC/HDLC SERPL: 1.17 {RATIO}
LEUKOCYTE EST, POC: NEGATIVE
Lab: ABNORMAL
NITRITE UR-MCNC: NEGATIVE MG/ML
PH UR: 8 [PH] (ref 5–8)
PROT UR STRIP-MCNC: NEGATIVE MG/DL
RBC # UR STRIP: NEGATIVE /UL
SP GR UR: 1.01 (ref 1–1.03)
TRIGL SERPL-MCNC: 61 MG/DL (ref 0–150)
UROBILINOGEN UR QL: NORMAL
VLDLC SERPL-MCNC: 11 MG/DL (ref 5–40)

## 2023-08-01 PROCEDURE — 81003 URINALYSIS AUTO W/O SCOPE: CPT | Performed by: INTERNAL MEDICINE

## 2023-08-01 PROCEDURE — 80061 LIPID PANEL: CPT | Performed by: INTERNAL MEDICINE

## 2023-08-01 PROCEDURE — 85025 COMPLETE CBC W/AUTO DIFF WBC: CPT | Performed by: INTERNAL MEDICINE

## 2023-08-01 PROCEDURE — 1159F MED LIST DOCD IN RCRD: CPT | Performed by: INTERNAL MEDICINE

## 2023-08-01 PROCEDURE — 36415 COLL VENOUS BLD VENIPUNCTURE: CPT | Performed by: INTERNAL MEDICINE

## 2023-08-01 PROCEDURE — 84443 ASSAY THYROID STIM HORMONE: CPT | Performed by: INTERNAL MEDICINE

## 2023-08-01 PROCEDURE — 99214 OFFICE O/P EST MOD 30 MIN: CPT | Performed by: INTERNAL MEDICINE

## 2023-08-01 PROCEDURE — 80053 COMPREHEN METABOLIC PANEL: CPT | Performed by: INTERNAL MEDICINE

## 2023-08-01 PROCEDURE — 1160F RVW MEDS BY RX/DR IN RCRD: CPT | Performed by: INTERNAL MEDICINE

## 2023-08-01 RX ORDER — TRAMADOL HYDROCHLORIDE 50 MG/1
50-100 TABLET ORAL EVERY 6 HOURS PRN
Qty: 240 TABLET | Refills: 1 | Status: SHIPPED | OUTPATIENT
Start: 2023-08-01

## 2023-08-01 RX ORDER — METRONIDAZOLE 10 MG/G
GEL TOPICAL DAILY
Qty: 60 G | Refills: 2 | Status: SHIPPED | OUTPATIENT
Start: 2023-08-01

## 2023-08-02 LAB
ALBUMIN SERPL-MCNC: 4.3 G/DL (ref 3.5–5.2)
ALBUMIN/GLOB SERPL: 1.7 G/DL
ALP SERPL-CCNC: 40 U/L (ref 39–117)
ALT SERPL W P-5'-P-CCNC: 13 U/L (ref 1–33)
ANION GAP SERPL CALCULATED.3IONS-SCNC: 12 MMOL/L (ref 5–15)
AST SERPL-CCNC: 24 U/L (ref 1–32)
BASOPHILS # BLD AUTO: 0.05 10*3/MM3 (ref 0–0.2)
BASOPHILS NFR BLD AUTO: 0.9 % (ref 0–1.5)
BILIRUB SERPL-MCNC: 0.2 MG/DL (ref 0–1.2)
BUN SERPL-MCNC: 16 MG/DL (ref 8–23)
BUN/CREAT SERPL: 20.5 (ref 7–25)
CALCIUM SPEC-SCNC: 9.4 MG/DL (ref 8.6–10.5)
CHLORIDE SERPL-SCNC: 95 MMOL/L (ref 98–107)
CO2 SERPL-SCNC: 27 MMOL/L (ref 22–29)
CREAT SERPL-MCNC: 0.78 MG/DL (ref 0.57–1)
DEPRECATED RDW RBC AUTO: 43 FL (ref 37–54)
EGFRCR SERPLBLD CKD-EPI 2021: 78.3 ML/MIN/1.73
EOSINOPHIL # BLD AUTO: 0.02 10*3/MM3 (ref 0–0.4)
EOSINOPHIL NFR BLD AUTO: 0.4 % (ref 0.3–6.2)
ERYTHROCYTE [DISTWIDTH] IN BLOOD BY AUTOMATED COUNT: 14 % (ref 12.3–15.4)
GLOBULIN UR ELPH-MCNC: 2.5 GM/DL
GLUCOSE SERPL-MCNC: 93 MG/DL (ref 65–99)
HCT VFR BLD AUTO: 32.2 % (ref 34–46.6)
HGB BLD-MCNC: 10.8 G/DL (ref 12–15.9)
IMM GRANULOCYTES # BLD AUTO: 0.01 10*3/MM3 (ref 0–0.05)
IMM GRANULOCYTES NFR BLD AUTO: 0.2 % (ref 0–0.5)
LYMPHOCYTES # BLD AUTO: 1.64 10*3/MM3 (ref 0.7–3.1)
LYMPHOCYTES NFR BLD AUTO: 28.9 % (ref 19.6–45.3)
MCH RBC QN AUTO: 28.6 PG (ref 26.6–33)
MCHC RBC AUTO-ENTMCNC: 33.5 G/DL (ref 31.5–35.7)
MCV RBC AUTO: 85.2 FL (ref 79–97)
MONOCYTES # BLD AUTO: 0.72 10*3/MM3 (ref 0.1–0.9)
MONOCYTES NFR BLD AUTO: 12.7 % (ref 5–12)
NEUTROPHILS NFR BLD AUTO: 3.24 10*3/MM3 (ref 1.7–7)
NEUTROPHILS NFR BLD AUTO: 56.9 % (ref 42.7–76)
NRBC BLD AUTO-RTO: 0 /100 WBC (ref 0–0.2)
PLATELET # BLD AUTO: 309 10*3/MM3 (ref 140–450)
PMV BLD AUTO: 9.3 FL (ref 6–12)
POTASSIUM SERPL-SCNC: 3.8 MMOL/L (ref 3.5–5.2)
PROT SERPL-MCNC: 6.8 G/DL (ref 6–8.5)
RBC # BLD AUTO: 3.78 10*6/MM3 (ref 3.77–5.28)
SODIUM SERPL-SCNC: 134 MMOL/L (ref 136–145)
TSH SERPL DL<=0.05 MIU/L-ACNC: 2.26 UIU/ML (ref 0.27–4.2)
WBC NRBC COR # BLD: 5.68 10*3/MM3 (ref 3.4–10.8)

## 2023-08-14 ENCOUNTER — OFFICE VISIT (OUTPATIENT)
Dept: INTERNAL MEDICINE | Facility: CLINIC | Age: 78
End: 2023-08-14
Payer: MEDICARE

## 2023-08-14 VITALS
BODY MASS INDEX: 25.74 KG/M2 | RESPIRATION RATE: 12 BRPM | TEMPERATURE: 98.2 F | WEIGHT: 134 LBS | SYSTOLIC BLOOD PRESSURE: 116 MMHG | DIASTOLIC BLOOD PRESSURE: 72 MMHG | HEART RATE: 88 BPM

## 2023-08-14 DIAGNOSIS — R60.0 LOWER EXTREMITY EDEMA: Primary | ICD-10-CM

## 2023-08-14 PROBLEM — R01.1 HEART MURMUR: Status: ACTIVE | Noted: 2019-01-01

## 2023-08-14 NOTE — PROGRESS NOTES
Office Note     Name: Brandi Duncan    : 1945     MRN: [id number]     Chief Complaint  Edema (Bilateral extremity edema x3 days )    Subjective     History of Present Illness:  Brandi Duncan is a 77 y.o. female who presents today for bilateral ankle edema.     The patient has endorsed bilateral ankle pitting edema for the last 3 to 4 days. She notes that her ankles never swell. For the last 2 nights she has slept with her feet elevated. Her edema has improved today. She denies any pain. Patient has gained a couple pounds since her last office visit. She has a history of heart murmur for the last 5 years. She had an echocardiogram in  and on 2023 with Dr. Singh and both were normal. Patient denies any additional symptoms including chest pain and SOA.    Review of Systems:   Review of Systems    Past Medical History:   Past Medical History:   Diagnosis Date    Anemia     Arthritis     Cancer     Carpal tunnel syndrome     Cataract 2019    Chronic idiopathic constipation     Colon polyp     GERD (gastroesophageal reflux disease)     Heart murmur 2019    Hemorrhoid     History of medical problems 2018    Neuropathy    HL (hearing loss) 2019    left ear    Low back pain 2000    Meniere's disease     left ear    Osteopenia     Osteoporosis     Ovarian cancer     Peripheral neuropathy     Scoliosis     Urinary retention        Past Surgical History:   Past Surgical History:   Procedure Laterality Date    APPENDECTOMY      CATARACT EXTRACTION, BILATERAL Bilateral     COLONOSCOPY  ,     CYSTOCELE REPAIR      HEMORRHOID BANDING      HEMORRHOIDECTOMY      HYSTERECTOMY      JOINT REPLACEMENT  03/15/2018    Middle finger n right hand    MYOMECTOMY      OOPHORECTOMY      OVARY SURGERY      SALPINGECTOMY         Immunizations:   Immunization History   Administered Date(s) Administered    COVID-19 (MODERNA) 1st,2nd,3rd Dose Monovalent 2021, 2021, 2021    COVID-19 (MODERNA)  Monovalent Original Booster 03/23/2021, 11/08/2021, 04/28/2022    COVID-19 (UNSPECIFIED) 03/01/2021, 03/29/2021    FLUAD TRI 65YR+ 03/05/2020    Flu Vaccine Quad PF >36MO 09/01/2020    Fluad Quad 65+ 10/29/2020    Fluzone High Dose =>65 Years ([employer] ONLY) 10/12/2021    Fluzone High-Dose 65+yrs 10/12/2021, 10/17/2022    Fluzone Quad >6mos (Multi-dose) 09/01/2020    Influenza, Unspecified 10/01/2019, 02/01/2020, 10/17/2022    Pneumococcal Conjugate 13-Valent (PCV13) 06/14/2018    Pneumococcal Polysaccharide (PPSV23) 08/16/2019, 10/01/2019    Pneumococcal, Unspecified 04/21/2019    Shingrix 01/30/2020, 08/14/2020        Medications:     Current Outpatient Medications:     acetaminophen (TYLENOL) 650 MG 8 hr tablet, Take 2 tablets by mouth Every 8 (Eight) Hours As Needed for Mild Pain., Disp: , Rfl:     AMITIZA 24 MCG capsule, Take 1 capsule by mouth 2 (Two) Times a Day., Disp: , Rfl:     B Complex Vitamins (VITAMIN B COMPLEX PO), Take 1 tablet by mouth Daily., Disp: , Rfl:     Calcium-Magnesium-Vitamin D (CALCIUM MAGNESIUM PO), Take  by mouth., Disp: , Rfl:     custom compounded cream, Apply  topically to the appropriate area as directed. CUSTOM COMPOUNDED RECTAL SUPPOSITORIES BELLADONNA/MORPHINE SULF 16.2/5MG  INSERT 1 SUPPOSITORY TWICE DAILY AS NEEDED, Disp: , Rfl:     custom compounded cream, Apply  topically to the appropriate area as directed. CUSTOM COMPOUNDED RECTAL SUPPOSITORY HYDROCORTISONE/LIDOCAINE 2.5% / 2% INSERT 1 SUPPOSITORY TWICE DAILY AS NEEDED, Disp: , Rfl:     DHA-EPA-Vitamin E (OMEGA-3 COMPLEX PO), Take 1 capsule by mouth Daily., Disp: , Rfl:     diazePAM (DIASTAT) 2.5 MG gel, Insert 5 mg into the rectum At Night As Needed., Disp: , Rfl:     diclofenac (VOLTAREN) 1 % gel gel, Apply four grams topically to affected areas four times a day as needed for pain., Disp: 100 g, Rfl: 1    Dietary Management Product (VASCULERA PO), Take 1 tablet by mouth 2 (Two) Times a Day., Disp: , Rfl:      Diphenhydramine-APAP, sleep, (TYLENOL PM EXTRA STRENGTH PO), Take 2 tablets by mouth At Night As Needed., Disp: , Rfl:     famotidine (PEPCID) 20 MG tablet, Take 1 tablet by mouth 2 (Two) Times a Day As Needed., Disp: , Rfl:     fluticasone (FLONASE) 50 MCG/ACT nasal spray, 2 sprays into the nostril(s) as directed by provider Daily. (Patient taking differently: 2 sprays into the nostril(s) as directed by provider Daily As Needed.), Disp: 16 g, Rfl: 3    gabapentin (NEURONTIN) 100 MG capsule, Take 1 capsule by mouth 3 (Three) Times a Day., Disp: 90 capsule, Rfl: 2    gabapentin (NEURONTIN) 300 MG capsule, Take 1 capsule by mouth 5 (Five) Times a Day., Disp: , Rfl:     Lidocaine, Anorectal, (LMX 5) 5 % cream cream, Apply  topically 4 (Four) Times a Day As Needed., Disp: , Rfl:     meclizine (ANTIVERT) 12.5 MG tablet, Take 1 tablet by mouth Every 6 (Six) Hours As Needed., Disp: , Rfl:     metroNIDAZOLE (Metrogel) 1 % gel, Apply  topically to the appropriate area as directed Daily., Disp: 60 g, Rfl: 2    montelukast (Singulair) 10 MG tablet, Take 1 tablet by mouth Every Night., Disp: 90 tablet, Rfl: 1    pantoprazole (PROTONIX) 40 MG EC tablet, Take 1 tablet by mouth Daily., Disp: 90 tablet, Rfl: 1    polyethylene glycol (MIRALAX) powder, Take 17 g by mouth Daily As Needed (constipation)., Disp: , Rfl:     Probiotic Product (PROBIOTIC DAILY PO), Take 1 tablet by mouth Daily., Disp: , Rfl:     raloxifene (EVISTA) 60 MG tablet, TAKE ONE TABLET BY MOUTH DAILY, Disp: 90 tablet, Rfl: 1    Simethicone (GAS-X PO), Take  by mouth., Disp: , Rfl:     traMADol (ULTRAM) 50 MG tablet, Take 1-2 tablets by mouth Every 6 (Six) Hours As Needed for Moderate Pain., Disp: 240 tablet, Rfl: 1    triamterene-hydrochlorothiazide (DYAZIDE) 37.5-25 MG per capsule, Take 1 capsule by mouth Daily., Disp: , Rfl:     VITAMIN D, CHOLECALCIFEROL, PO, Take  by mouth., Disp: , Rfl:     Wheat Dextrin (BENEFIBER DRINK MIX PO), Take  by mouth., Disp: , Rfl:  "    Allergies:   Allergies   Allergen Reactions    Motrin [Ibuprofen] Nausea Only       Family History:   Family History   Problem Relation Age of Onset    Arthritis Mother     Hypertension Mother     Stroke Mother     Heart disease Father     Hyperlipidemia Father     Hypertension Sister     Heart disease Brother     Colon cancer Maternal Grandmother     Diabetes Maternal Grandmother     Cancer Maternal Grandmother     Stroke Maternal Grandfather     Alcohol abuse Paternal Grandfather     Ovarian cancer Neg Hx        Social History:   Social History     Socioeconomic History    Marital status:    Tobacco Use    Smoking status: Never     Passive exposure: Yes    Smokeless tobacco: Never    Tobacco comments:     sporatic cigar   Vaping Use    Vaping Use: Never used   Substance and Sexual Activity    Alcohol use: Yes     Alcohol/week: 7.0 standard drinks     Types: 7 Glasses of wine per week    Drug use: No    Sexual activity: Not Currently     Partners: Male     Birth control/protection: Post-menopausal         Objective     Vital Signs  /72 (BP Location: Left arm, Patient Position: Sitting, Cuff Size: Adult)   Pulse 88   Temp 98.2 øF (36.8 øC) (Infrared)   Resp 12   Wt 60.8 kg (134 lb)   BMI 25.74 kg/mý   Estimated body mass index is 25.74 kg/mý as calculated from the following:    Height as of 6/12/23: 153.7 cm (60.5\").    Weight as of this encounter: 60.8 kg (134 lb).          Physical Exam  Vitals and nursing note reviewed.   Constitutional:       Appearance: Normal appearance.   Cardiovascular:      Rate and Rhythm: Normal rate and regular rhythm.      Pulses: Normal pulses.      Heart sounds: Normal heart sounds.      Comments: No edema noted at this time  No posterior calf pain bilaterally  Brisk cap refill in all digits  Pulmonary:      Effort: Pulmonary effort is normal.      Breath sounds: Normal breath sounds.   Skin:     General: Skin is warm and dry.   Neurological:      Mental Status: " She is alert.   Psychiatric:         Mood and Affect: Mood normal.         Behavior: Behavior normal.        Assessment and Plan     1. Lower extremity edema  Patient politely declined imaging and lab work at this time.     Symptoms improved today. Denies any pain.     Follow-up if symptoms worsen or fail to improve.     Encouraged elevated legs and compression socks. Encouraged daily weight and monitoring salt intake.    Red flag symptoms and indications to report to ER discussed with patient     Patient verbalized good understanding of diagnosis and treatment plan.  All questions answered to their satisfaction.      Follow Up  No follow-ups on file.    MELA Cuevas [facility]  [address] [address] [phone]      Transcribed from ambient dictation for Zoila Bruner PA-C by Albert Hebert.  08/14/23   16:23 EDT    Patient or patient representative verbalized consent to the visit recording.  I have personally performed the services described in this document as transcribed by the above individual, and it is both accurate and complete.

## 2023-08-22 DIAGNOSIS — M54.50 CHRONIC LOW BACK PAIN WITHOUT SCIATICA, UNSPECIFIED BACK PAIN LATERALITY: ICD-10-CM

## 2023-08-22 DIAGNOSIS — G89.29 CHRONIC LOW BACK PAIN WITHOUT SCIATICA, UNSPECIFIED BACK PAIN LATERALITY: ICD-10-CM

## 2023-08-23 RX ORDER — TRAMADOL HYDROCHLORIDE 50 MG/1
50-100 TABLET ORAL EVERY 6 HOURS PRN
Qty: 240 TABLET | Refills: 1 | Status: SHIPPED | OUTPATIENT
Start: 2023-08-23

## 2023-08-23 NOTE — TELEPHONE ENCOUNTER
Last office visit (LOV) for chronic conditions 08/14/23  Next office visit (NOV) 09/07/23  Urine drug screen (UDS)  Controlled substance agreement (CSA) 03/02/20

## 2023-08-30 RX ORDER — RALOXIFENE HYDROCHLORIDE 60 MG/1
TABLET, FILM COATED ORAL
Qty: 90 TABLET | Refills: 1 | Status: SHIPPED | OUTPATIENT
Start: 2023-08-30

## 2023-09-07 ENCOUNTER — OFFICE VISIT (OUTPATIENT)
Dept: INTERNAL MEDICINE | Facility: CLINIC | Age: 78
End: 2023-09-07
Payer: MEDICARE

## 2023-09-07 VITALS
RESPIRATION RATE: 18 BRPM | BODY MASS INDEX: 25.32 KG/M2 | DIASTOLIC BLOOD PRESSURE: 60 MMHG | WEIGHT: 129 LBS | TEMPERATURE: 97.7 F | HEART RATE: 76 BPM | SYSTOLIC BLOOD PRESSURE: 108 MMHG | HEIGHT: 60 IN

## 2023-09-07 DIAGNOSIS — I10 ESSENTIAL HYPERTENSION: ICD-10-CM

## 2023-09-07 DIAGNOSIS — K21.9 GASTROESOPHAGEAL REFLUX DISEASE WITHOUT ESOPHAGITIS: ICD-10-CM

## 2023-09-07 DIAGNOSIS — Z00.00 MEDICARE ANNUAL WELLNESS VISIT, SUBSEQUENT: Primary | ICD-10-CM

## 2023-09-07 DIAGNOSIS — E78.5 HYPERLIPIDEMIA, UNSPECIFIED HYPERLIPIDEMIA TYPE: ICD-10-CM

## 2023-09-07 LAB
ALBUMIN SERPL-MCNC: 3.9 G/DL (ref 3.5–5.2)
ALBUMIN/GLOB SERPL: 1.5 G/DL
ALP SERPL-CCNC: 44 U/L (ref 39–117)
ALT SERPL W P-5'-P-CCNC: 10 U/L (ref 1–33)
ANION GAP SERPL CALCULATED.3IONS-SCNC: 11.5 MMOL/L (ref 5–15)
AST SERPL-CCNC: 20 U/L (ref 1–32)
BASOPHILS # BLD AUTO: 0.06 10*3/MM3 (ref 0–0.2)
BASOPHILS NFR BLD AUTO: 1 % (ref 0–1.5)
BILIRUB BLD-MCNC: NEGATIVE MG/DL
BILIRUB SERPL-MCNC: 0.2 MG/DL (ref 0–1.2)
BUN SERPL-MCNC: 17 MG/DL (ref 8–23)
BUN/CREAT SERPL: 20.7 (ref 7–25)
CALCIUM SPEC-SCNC: 8.9 MG/DL (ref 8.6–10.5)
CHLORIDE SERPL-SCNC: 100 MMOL/L (ref 98–107)
CHOLEST SERPL-MCNC: 189 MG/DL (ref 0–200)
CLARITY, POC: CLEAR
CO2 SERPL-SCNC: 25.5 MMOL/L (ref 22–29)
COLOR UR: YELLOW
CREAT SERPL-MCNC: 0.82 MG/DL (ref 0.57–1)
DEPRECATED RDW RBC AUTO: 39.4 FL (ref 37–54)
EGFRCR SERPLBLD CKD-EPI 2021: 73.8 ML/MIN/1.73
EOSINOPHIL # BLD AUTO: 0.09 10*3/MM3 (ref 0–0.4)
EOSINOPHIL NFR BLD AUTO: 1.6 % (ref 0.3–6.2)
ERYTHROCYTE [DISTWIDTH] IN BLOOD BY AUTOMATED COUNT: 13 % (ref 12.3–15.4)
EXPIRATION DATE: ABNORMAL
GLOBULIN UR ELPH-MCNC: 2.6 GM/DL
GLUCOSE SERPL-MCNC: 97 MG/DL (ref 65–99)
GLUCOSE UR STRIP-MCNC: NEGATIVE MG/DL
HCT VFR BLD AUTO: 31.3 % (ref 34–46.6)
HDLC SERPL-MCNC: 73 MG/DL (ref 40–60)
HGB BLD-MCNC: 10.5 G/DL (ref 12–15.9)
IMM GRANULOCYTES # BLD AUTO: 0.02 10*3/MM3 (ref 0–0.05)
IMM GRANULOCYTES NFR BLD AUTO: 0.3 % (ref 0–0.5)
KETONES UR QL: NEGATIVE
LDLC SERPL CALC-MCNC: 105 MG/DL (ref 0–100)
LDLC/HDLC SERPL: 1.42 {RATIO}
LEUKOCYTE EST, POC: ABNORMAL
LYMPHOCYTES # BLD AUTO: 1.64 10*3/MM3 (ref 0.7–3.1)
LYMPHOCYTES NFR BLD AUTO: 28.6 % (ref 19.6–45.3)
Lab: ABNORMAL
MCH RBC QN AUTO: 28.3 PG (ref 26.6–33)
MCHC RBC AUTO-ENTMCNC: 33.5 G/DL (ref 31.5–35.7)
MCV RBC AUTO: 84.4 FL (ref 79–97)
MONOCYTES # BLD AUTO: 0.69 10*3/MM3 (ref 0.1–0.9)
MONOCYTES NFR BLD AUTO: 12 % (ref 5–12)
NEUTROPHILS NFR BLD AUTO: 3.24 10*3/MM3 (ref 1.7–7)
NEUTROPHILS NFR BLD AUTO: 56.5 % (ref 42.7–76)
NITRITE UR-MCNC: NEGATIVE MG/ML
NRBC BLD AUTO-RTO: 0 /100 WBC (ref 0–0.2)
PH UR: 6 [PH] (ref 5–8)
PLATELET # BLD AUTO: 318 10*3/MM3 (ref 140–450)
PMV BLD AUTO: 9.5 FL (ref 6–12)
POTASSIUM SERPL-SCNC: 3.9 MMOL/L (ref 3.5–5.2)
PROT SERPL-MCNC: 6.5 G/DL (ref 6–8.5)
PROT UR STRIP-MCNC: NEGATIVE MG/DL
RBC # BLD AUTO: 3.71 10*6/MM3 (ref 3.77–5.28)
RBC # UR STRIP: NEGATIVE /UL
SODIUM SERPL-SCNC: 137 MMOL/L (ref 136–145)
SP GR UR: 1.02 (ref 1–1.03)
TRIGL SERPL-MCNC: 61 MG/DL (ref 0–150)
TSH SERPL DL<=0.05 MIU/L-ACNC: 2.03 UIU/ML (ref 0.27–4.2)
UROBILINOGEN UR QL: NORMAL
VLDLC SERPL-MCNC: 11 MG/DL (ref 5–40)
WBC NRBC COR # BLD: 5.74 10*3/MM3 (ref 3.4–10.8)

## 2023-09-07 PROCEDURE — 1170F FXNL STATUS ASSESSED: CPT | Performed by: INTERNAL MEDICINE

## 2023-09-07 PROCEDURE — 84443 ASSAY THYROID STIM HORMONE: CPT | Performed by: INTERNAL MEDICINE

## 2023-09-07 PROCEDURE — G0439 PPPS, SUBSEQ VISIT: HCPCS | Performed by: INTERNAL MEDICINE

## 2023-09-07 PROCEDURE — 85025 COMPLETE CBC W/AUTO DIFF WBC: CPT | Performed by: INTERNAL MEDICINE

## 2023-09-07 PROCEDURE — 80061 LIPID PANEL: CPT | Performed by: INTERNAL MEDICINE

## 2023-09-07 PROCEDURE — 1159F MED LIST DOCD IN RCRD: CPT | Performed by: INTERNAL MEDICINE

## 2023-09-07 PROCEDURE — 80053 COMPREHEN METABOLIC PANEL: CPT | Performed by: INTERNAL MEDICINE

## 2023-09-07 PROCEDURE — 1160F RVW MEDS BY RX/DR IN RCRD: CPT | Performed by: INTERNAL MEDICINE

## 2023-09-07 NOTE — PATIENT INSTRUCTIONS
Medicare Wellness  Personal Prevention Plan of Service     Date of Office Visit:    Encounter Provider:  Abram Myers MD  Place of Service:  [facility]  Patient Name: Brandi Duncan  :  1945    As part of the Medicare Wellness portion of your visit today, we are providing you with this personalized preventive plan of services (PPPS). This plan is based upon recommendations of the [place] Preventive Services Task Force (USPSTF) and the Advisory Committee on Immunization Practices (ACIP).    This lists the preventive care services that should be considered, and provides dates of when you are due. Items listed as completed are up-to-date and do not require any further intervention.    Health Maintenance   Topic Date Due    TDAP/TD VACCINES (1 - Tdap) Never done    COVID-19 Vaccine (7 - Moderna series) 2022    ANNUAL WELLNESS VISIT  2023    INFLUENZA VACCINE  10/01/2023    LIPID PANEL  2024    DXA SCAN  2025    COLORECTAL CANCER SCREENING  2028    HEPATITIS C SCREENING  Completed    Pneumococcal Vaccine 65+  Completed    ZOSTER VACCINE  Completed       Orders Placed This Encounter   Procedures    Comprehensive Metabolic Panel     Standing Status:   Future     Standing Expiration Date:   2024     Order Specific Question:   Release to patient     Answer:   Routine Release [[id number]]    Lipid Panel     Standing Status:   Future     Standing Expiration Date:   2024     Order Specific Question:   Release to patient     Answer:   Routine Release [[id number]]    TSH     Standing Status:   Future     Standing Expiration Date:   2024     Order Specific Question:   Release to patient     Answer:   Routine Release [[id number]]    POC Urinalysis Dipstick, Automated     Standing Status:   Future     Order Specific Question:   Release to patient     Answer:   Routine Release [[id number]]    CBC & Differential      Standing Status:   Future     Standing Expiration Date:   9/7/2024     Order Specific Question:   Manual Differential     Answer:   No     Order Specific Question:   Release to patient     Answer:   Routine Release [[id number]]       Return in about 6 months (around 3/7/2024) for Follow-up.

## 2023-09-07 NOTE — PROGRESS NOTES
The ABCs of the Annual Wellness Visit  Subsequent Medicare Wellness Visit    Subjective      Brandi Duncan is a 77 y.o. female who presents for a Subsequent Medicare Wellness Visit.    The following portions of the patient's history were reviewed and   updated as appropriate: allergies, current medications, past family history, past medical history, past social history, past surgical history, and problem list.    Compared to one year ago, the patient feels her physical   health is the same.    Compared to one year ago, the patient feels her mental   health is the same.    Recent Hospitalizations:  She was not admitted to the hospital during the last year.       Current Medical Providers:  Patient Care Team:  Abram Myers MD as PCP - General (Internal Medicine)  Yarelis Eddy MD as Consulting Physician (Colon and Rectal Surgery)  Yarelis Eddy MD as Consulting Physician (Colon and Rectal Surgery)  Vamsi Fleming MD as Consulting Physician (Neurology)  Lei Fontaine MD as Consulting Physician (Hand Surgery)  Brandi Aly MD as Consulting Physician (Dermatology)  Derick Cho (Urology)  Bautista Crystal MD as Consulting Physician (Otolaryngology)  Davy Singh MD as Consulting Physician (Cardiology)  Keo Tafoya MD as Surgeon (Orthopedic Surgery)    Outpatient Medications Prior to Visit   Medication Sig Dispense Refill    acetaminophen (TYLENOL) 650 MG 8 hr tablet Take 2 tablets by mouth Every 8 (Eight) Hours As Needed for Mild Pain.      AMITIZA 24 MCG capsule Take 1 capsule by mouth 2 (Two) Times a Day.      B Complex Vitamins (VITAMIN B COMPLEX PO) Take 1 tablet by mouth Daily.      Calcium-Magnesium-Vitamin D (CALCIUM MAGNESIUM PO) Take  by mouth.      custom compounded cream Apply  topically to the appropriate area as directed. CUSTOM COMPOUNDED RECTAL SUPPOSITORIES  BELLADONNA/MORPHINE SULF 16.2/5MG   INSERT 1 SUPPOSITORY TWICE DAILY AS NEEDED      custom  compounded cream Apply  topically to the appropriate area as directed. CUSTOM COMPOUNDED RECTAL SUPPOSITORY  HYDROCORTISONE/LIDOCAINE 2.5% / 2%  INSERT 1 SUPPOSITORY TWICE DAILY AS NEEDED      DHA-EPA-Vitamin E (OMEGA-3 COMPLEX PO) Take 1 capsule by mouth Daily.      diazePAM (DIASTAT) 2.5 MG gel Insert 5 mg into the rectum At Night As Needed.      diclofenac (VOLTAREN) 1 % gel gel Apply four grams topically to affected areas four times a day as needed for pain. 100 g 1    Dietary Management Product (VASCULERA PO) Take 1 tablet by mouth 2 (Two) Times a Day.      Diphenhydramine-APAP, sleep, (TYLENOL PM EXTRA STRENGTH PO) Take 2 tablets by mouth At Night As Needed.      famotidine (PEPCID) 20 MG tablet Take 1 tablet by mouth 2 (Two) Times a Day As Needed.      fluticasone (FLONASE) 50 MCG/ACT nasal spray 2 sprays into the nostril(s) as directed by provider Daily. (Patient taking differently: 2 sprays into the nostril(s) as directed by provider Daily As Needed.) 16 g 3    gabapentin (NEURONTIN) 100 MG capsule Take 1 capsule by mouth 3 (Three) Times a Day. 90 capsule 2    gabapentin (NEURONTIN) 300 MG capsule Take 1 capsule by mouth 5 (Five) Times a Day.      Lidocaine, Anorectal, (LMX 5) 5 % cream cream Apply  topically 4 (Four) Times a Day As Needed.      meclizine (ANTIVERT) 12.5 MG tablet Take 1 tablet by mouth Every 6 (Six) Hours As Needed.      metroNIDAZOLE (Metrogel) 1 % gel Apply  topically to the appropriate area as directed Daily. 60 g 2    montelukast (Singulair) 10 MG tablet Take 1 tablet by mouth Every Night. 90 tablet 1    pantoprazole (PROTONIX) 40 MG EC tablet Take 1 tablet by mouth Daily. 90 tablet 1    polyethylene glycol (MIRALAX) powder Take 17 g by mouth Daily As Needed (constipation).      Probiotic Product (PROBIOTIC DAILY PO) Take 1 tablet by mouth Daily.      raloxifene (EVISTA) 60 MG tablet TAKE ONE TABLET BY MOUTH DAILY 90 tablet 1    Simethicone (GAS-X PO) Take  by mouth.      traMADol  "(ULTRAM) 50 MG tablet Take 1-2 tablets by mouth Every 6 (Six) Hours As Needed for Moderate Pain. 240 tablet 1    triamterene-hydrochlorothiazide (DYAZIDE) 37.5-25 MG per capsule Take 1 capsule by mouth Daily.      VITAMIN D, CHOLECALCIFEROL, PO Take  by mouth Daily.      Wheat Dextrin (BENEFIBER DRINK MIX PO) Take  by mouth Daily.       No facility-administered medications prior to visit.       Opioid medication/s are on active medication list.  and I have evaluated her active treatment plan and pain score trends (see table).  Vitals:    09/07/23 0929   PainSc:   2   PainLoc: Back     I have reviewed the chart for potential of high risk medication and harmful drug interactions in the elderly.          Aspirin is not on active medication list.  Aspirin use is not indicated based on review of current medical condition/s. Risk of harm outweighs potential benefits.  .    Patient Active Problem List   Diagnosis    Rectal pain    S/P hemorrhoidectomy 11/1/16    Urinary retention    Acute urinary retention    Chronic low back pain    Osteopenia of neck of femur    Heart murmur     Advance Care Planning   Advance Care Planning     Advance Directive is on file.  ACP discussion was held with the patient during this visit. Patient has an advance directive in EMR which is still valid.      Objective    Vitals:    09/07/23 0929   BP: 108/60   BP Location: Right arm   Patient Position: Sitting   Cuff Size: Adult   Pulse: 76   Resp: 18   Temp: 97.7 °F (36.5 °C)   TempSrc: Infrared   Weight: 58.5 kg (129 lb)   Height: 153 cm (60.25\")   PainSc:   2   PainLoc: Back     Estimated body mass index is 24.98 kg/m² as calculated from the following:    Height as of this encounter: 153 cm (60.25\").    Weight as of this encounter: 58.5 kg (129 lb).    BMI is within normal parameters. No other follow-up for BMI required.    Finger Rub Hearing{Test (right ear):passed  Finger Rub Hearing{Test (left ear):passed    Does the patient have evidence " of cognitive impairment?   No    Lab Results   Component Value Date    TRIG 61 2023    HDL 89 (H) 2023     (H) 2023    VLDL 11 2023          HEALTH RISK ASSESSMENT    Smoking Status:  Social History     Tobacco Use   Smoking Status Never    Passive exposure: Yes   Smokeless Tobacco Never   Tobacco Comments    sporatic cigar     Alcohol Consumption:  Social History     Substance and Sexual Activity   Alcohol Use Yes    Alcohol/week: 7.0 standard drinks    Types: 7 Glasses of wine per week     Fall Risk Screen:    FRACISCO Fall Risk Assessment was completed, and patient is at MODERATE risk for falls. Assessment completed on:2023    Depression Screenin/7/2023     9:36 AM   PHQ-2/PHQ-9 Depression Screening   Little Interest or Pleasure in Doing Things 0-->not at all   Feeling Down, Depressed or Hopeless 0-->not at all   PHQ-9: Brief Depression Severity Measure Score 0       Health Habits and Functional and Cognitive Screenin/7/2023     9:35 AM   Functional & Cognitive Status   Do you have difficulty preparing food and eating? No   Do you have difficulty bathing yourself, getting dressed or grooming yourself? No   Do you have difficulty using the toilet? No   Do you have difficulty moving around from place to place? No   Do you have trouble with steps or getting out of a bed or a chair? No   Current Diet Well Balanced Diet   Dental Exam Up to date   Eye Exam Up to date   Exercise (times per week) 0 times per week   Current Exercises Include No Regular Exercise   Do you need help using the phone?  No   Are you deaf or do you have serious difficulty hearing?  No   Do you need help to go to places out of walking distance? No   Do you need help shopping? No   Do you need help preparing meals?  No   Do you need help with housework?  Yes   Do you need help with laundry? No   Do you need help taking your medications? No   Do you need help managing money? No   Do you ever drive  or ride in a car without wearing a seat belt? No   Have you felt unusual stress, anger or loneliness in the last month? No   Who do you live with? Spouse   If you need help, do you have trouble finding someone available to you? No   Have you been bothered in the last four weeks by sexual problems? No   Do you have difficulty concentrating, remembering or making decisions? No       Age-appropriate Screening Schedule:  Refer to the list below for future screening recommendations based on patient's age, sex and/or medical conditions. Orders for these recommended tests are listed in the plan section. The patient has been provided with a written plan.    Health Maintenance   Topic Date Due    TDAP/TD VACCINES (1 - Tdap) Never done    COVID-19 Vaccine (7 - Moderna series) 08/28/2022    ANNUAL WELLNESS VISIT  06/08/2023    INFLUENZA VACCINE  10/01/2023    LIPID PANEL  08/01/2024    DXA SCAN  07/18/2025    COLORECTAL CANCER SCREENING  08/25/2028    HEPATITIS C SCREENING  Completed    Pneumococcal Vaccine 65+  Completed    ZOSTER VACCINE  Completed                  CMS Preventative Services Quick Reference  Risk Factors Identified During Encounter:    Immunizations Discussed/Encouraged: Tdap and COVID19    The above risks/problems have been discussed with the patient.  Pertinent information has been shared with the patient in the After Visit Summary.    Diagnoses and all orders for this visit:    1. Medicare annual wellness visit, subsequent (Primary)    2. Essential hypertension  -     CBC & Differential; Future  -     Comprehensive Metabolic Panel; Future  -     TSH; Future  -     POC Urinalysis Dipstick, Automated; Future    3. Gastroesophageal reflux disease without esophagitis    4. Hyperlipidemia, unspecified hyperlipidemia type  -     Comprehensive Metabolic Panel; Future  -     Lipid Panel; Future          Follow Up:   Next Medicare Wellness visit to be scheduled in 1 year.      An After Visit Summary and PPPS were  made available to the patient.

## 2023-09-07 NOTE — PROGRESS NOTES
Chief Complaint   Patient presents with    Medicare Wellness-subsequent       History of Present Illness      The patient presents for a follow-up related to hypertension. The patient reports that she has had no headaches, chest pain, dyspnea, edema, syncope, blurred vision or palpitations. She states that she is taking her medication as prescribed. She is not having medication side effects.    The patient presents for a follow-up related to GERD. The patient is on famotidine and pantoprazole for her gastroesophageal reflux. The medication is taken on a regular basis and gives complete relief of the symptoms. She reports no abdominal pain, belching, diarrhea, dysphagia, early satiety, heartburn, hoarseness, nausea, odynophagia, rectal bleeding, vomiting or weight loss. The GERD has no known aggravating factors.    The patient presents for a follow-up related to hyperlipidemia. She is following a low fat diet. She reports that she is exercising. She is not taking medication for hyperlipidemia. She denies orthopnea, paroxysmal nocturnal dyspnea or dyspnea on exertion.    Medications      Current Outpatient Medications:     acetaminophen (TYLENOL) 650 MG 8 hr tablet, Take 2 tablets by mouth Every 8 (Eight) Hours As Needed for Mild Pain., Disp: , Rfl:     AMITIZA 24 MCG capsule, Take 1 capsule by mouth 2 (Two) Times a Day., Disp: , Rfl:     B Complex Vitamins (VITAMIN B COMPLEX PO), Take 1 tablet by mouth Daily., Disp: , Rfl:     Calcium-Magnesium-Vitamin D (CALCIUM MAGNESIUM PO), Take  by mouth., Disp: , Rfl:     custom compounded cream, Apply  topically to the appropriate area as directed. CUSTOM COMPOUNDED RECTAL SUPPOSITORIES BELLADONNA/MORPHINE SULF 16.2/5MG  INSERT 1 SUPPOSITORY TWICE DAILY AS NEEDED, Disp: , Rfl:     custom compounded cream, Apply  topically to the appropriate area as directed. CUSTOM COMPOUNDED RECTAL SUPPOSITORY HYDROCORTISONE/LIDOCAINE 2.5% / 2% INSERT 1 SUPPOSITORY TWICE DAILY AS NEEDED,  Disp: , Rfl:     DHA-EPA-Vitamin E (OMEGA-3 COMPLEX PO), Take 1 capsule by mouth Daily., Disp: , Rfl:     diazePAM (DIASTAT) 2.5 MG gel, Insert 5 mg into the rectum At Night As Needed., Disp: , Rfl:     diclofenac (VOLTAREN) 1 % gel gel, Apply four grams topically to affected areas four times a day as needed for pain., Disp: 100 g, Rfl: 1    Dietary Management Product (VASCULERA PO), Take 1 tablet by mouth 2 (Two) Times a Day., Disp: , Rfl:     Diphenhydramine-APAP, sleep, (TYLENOL PM EXTRA STRENGTH PO), Take 2 tablets by mouth At Night As Needed., Disp: , Rfl:     famotidine (PEPCID) 20 MG tablet, Take 1 tablet by mouth 2 (Two) Times a Day As Needed., Disp: , Rfl:     fluticasone (FLONASE) 50 MCG/ACT nasal spray, 2 sprays into the nostril(s) as directed by provider Daily. (Patient taking differently: 2 sprays into the nostril(s) as directed by provider Daily As Needed.), Disp: 16 g, Rfl: 3    gabapentin (NEURONTIN) 100 MG capsule, Take 1 capsule by mouth 3 (Three) Times a Day., Disp: 90 capsule, Rfl: 2    gabapentin (NEURONTIN) 300 MG capsule, Take 1 capsule by mouth 5 (Five) Times a Day., Disp: , Rfl:     Lidocaine, Anorectal, (LMX 5) 5 % cream cream, Apply  topically 4 (Four) Times a Day As Needed., Disp: , Rfl:     meclizine (ANTIVERT) 12.5 MG tablet, Take 1 tablet by mouth Every 6 (Six) Hours As Needed., Disp: , Rfl:     metroNIDAZOLE (Metrogel) 1 % gel, Apply  topically to the appropriate area as directed Daily., Disp: 60 g, Rfl: 2    montelukast (Singulair) 10 MG tablet, Take 1 tablet by mouth Every Night., Disp: 90 tablet, Rfl: 1    pantoprazole (PROTONIX) 40 MG EC tablet, Take 1 tablet by mouth Daily., Disp: 90 tablet, Rfl: 1    polyethylene glycol (MIRALAX) powder, Take 17 g by mouth Daily As Needed (constipation)., Disp: , Rfl:     Probiotic Product (PROBIOTIC DAILY PO), Take 1 tablet by mouth Daily., Disp: , Rfl:     raloxifene (EVISTA) 60 MG tablet, TAKE ONE TABLET BY MOUTH DAILY, Disp: 90 tablet, Rfl:  "1    Simethicone (GAS-X PO), Take  by mouth., Disp: , Rfl:     traMADol (ULTRAM) 50 MG tablet, Take 1-2 tablets by mouth Every 6 (Six) Hours As Needed for Moderate Pain., Disp: 240 tablet, Rfl: 1    triamterene-hydrochlorothiazide (DYAZIDE) 37.5-25 MG per capsule, Take 1 capsule by mouth Daily., Disp: , Rfl:     VITAMIN D, CHOLECALCIFEROL, PO, Take  by mouth Daily., Disp: , Rfl:     Wheat Dextrin (BENEFIBER DRINK MIX PO), Take  by mouth Daily., Disp: , Rfl:      Allergies    Allergies   Allergen Reactions    Motrin [Ibuprofen] Nausea Only       Problem List    Patient Active Problem List   Diagnosis    Rectal pain    S/P hemorrhoidectomy 11/1/16    Urinary retention    Acute urinary retention    Chronic low back pain    Osteopenia of neck of femur    Heart murmur       Medications, Allergies, Problems List and Past History were reviewed and updated.    Physical Examination    /60 (BP Location: Right arm, Patient Position: Sitting, Cuff Size: Adult)   Pulse 76   Temp 97.7 °F (36.5 °C) (Infrared)   Resp 18   Ht 153 cm (60.25\")   Wt 58.5 kg (129 lb)   LMP  (LMP Unknown)   BMI 24.98 kg/m²       HEENT: Head- Normocephalic Atraumatic. Facies- Within normal limits. Pinnas- Normal texture and shape bilaterally. Canals- Normal bilaterally. TMs- Normal bilaterally. Nares- Patent bilaterally. Nasal Septum- is normal. There is no tenderness to palpation over the frontal or maxillary sinuses. Lids- Normal bilaterally. Conjunctiva- Clear bilaterally. Sclera- Anicteric bilaterally. Oropharynx- Moist with no lesions. Tonsils- No enlargement, erythema or exudate.    Neck: Thyroid- non enlarged, symmetric and has no nodules. No bruits are detected. ROM- Normal Range of Motion with no rigidity.    Lungs: Auscultation- Clear to auscultation bilaterally. There are no retractions, clubbing or cyanosis. The Expiratory to Inspiratory ratio is equal.    Lymph Nodes: Cervical- no enlarged lymph nodes noted. Clavicular- no " enlarged supraclavicular lymph nodes noted. Axillary- no enlarged axillary lymph nodes noted. Inguinal- no enlarged inguinal lymph nodes noted.    Cardiovascular: There are no carotid bruits. Heart- Normal Rate with Regular rhythm and no murmurs. There are no gallops. There are no rubs. In the lower extremities there is no edema. The upper extremities do not have edema.    Abdomen: Soft, benign, non-tender with no masses, hernias, organomegaly or scars.    Breast: Normal contours bilaterally with no masses, discharge, skin changes or lumps. There are no scars noted.    Impression and Assessment    Essential Hypertension.    Gastroesophageal Reflux Disease.    Hyperlipidemia.    Plan    Gastroesophageal Reflux Disease Plan: The patient was instructed to continue the current medications.    Essential Hypertension Plan: The patient was instructed to continue the current medications.    Hyperlipidemia Plan: The patient was instructed to exercise daily and eat a low fat diet.    Diagnoses and all orders for this visit:    1. Medicare annual wellness visit, subsequent (Primary)    2. Essential hypertension  -     CBC & Differential; Future  -     Comprehensive Metabolic Panel; Future  -     TSH; Future  -     POC Urinalysis Dipstick, Automated; Future    3. Gastroesophageal reflux disease without esophagitis    4. Hyperlipidemia, unspecified hyperlipidemia type  -     Comprehensive Metabolic Panel; Future  -     Lipid Panel; Future        Return to Office    The patient was instructed to return for follow-up in 6 months. The patient was instructed to return sooner if the condition changes, worsens, or does not resolve.

## 2023-10-13 ENCOUNTER — OUTSIDE FACILITY SERVICE (OUTPATIENT)
Dept: GASTROENTEROLOGY | Facility: CLINIC | Age: 78
End: 2023-10-13
Payer: MEDICARE

## 2023-10-13 PROCEDURE — 88305 TISSUE EXAM BY PATHOLOGIST: CPT

## 2023-10-16 ENCOUNTER — LAB REQUISITION (OUTPATIENT)
Dept: LAB | Facility: HOSPITAL | Age: 78
End: 2023-10-16
Payer: MEDICARE

## 2023-10-16 DIAGNOSIS — K21.00 GASTRO-ESOPHAGEAL REFLUX DISEASE WITH ESOPHAGITIS, WITHOUT BLEEDING: ICD-10-CM

## 2023-10-16 DIAGNOSIS — K21.9 GASTRO-ESOPHAGEAL REFLUX DISEASE WITHOUT ESOPHAGITIS: ICD-10-CM

## 2023-10-16 DIAGNOSIS — K44.9 DIAPHRAGMATIC HERNIA WITHOUT OBSTRUCTION OR GANGRENE: ICD-10-CM

## 2023-10-17 LAB — REF LAB TEST METHOD: NORMAL

## 2023-10-23 DIAGNOSIS — M54.50 CHRONIC LOW BACK PAIN WITHOUT SCIATICA, UNSPECIFIED BACK PAIN LATERALITY: ICD-10-CM

## 2023-10-23 DIAGNOSIS — G89.29 CHRONIC LOW BACK PAIN WITHOUT SCIATICA, UNSPECIFIED BACK PAIN LATERALITY: ICD-10-CM

## 2023-10-23 RX ORDER — TRAMADOL HYDROCHLORIDE 50 MG/1
50-100 TABLET ORAL EVERY 6 HOURS PRN
Qty: 240 TABLET | Refills: 1 | Status: SHIPPED | OUTPATIENT
Start: 2023-10-23

## 2023-10-23 NOTE — TELEPHONE ENCOUNTER
Last office visit (LOV) for chronic conditions 09/07/23  Next office visit (NOV) 11/01/23  Urine drug screen (UDS)  Controlled substance agreement (CSA) 03/02/21

## 2023-11-01 ENCOUNTER — OFFICE VISIT (OUTPATIENT)
Dept: INTERNAL MEDICINE | Facility: CLINIC | Age: 78
End: 2023-11-01
Payer: MEDICARE

## 2023-11-01 VITALS
WEIGHT: 126 LBS | DIASTOLIC BLOOD PRESSURE: 56 MMHG | SYSTOLIC BLOOD PRESSURE: 118 MMHG | HEART RATE: 72 BPM | BODY MASS INDEX: 24.4 KG/M2 | RESPIRATION RATE: 16 BRPM | TEMPERATURE: 98 F

## 2023-11-01 DIAGNOSIS — I10 ESSENTIAL HYPERTENSION: Primary | ICD-10-CM

## 2023-11-01 DIAGNOSIS — G89.29 CHRONIC MIDLINE LOW BACK PAIN WITHOUT SCIATICA: ICD-10-CM

## 2023-11-01 DIAGNOSIS — K21.9 GASTROESOPHAGEAL REFLUX DISEASE WITHOUT ESOPHAGITIS: ICD-10-CM

## 2023-11-01 DIAGNOSIS — E78.5 HYPERLIPIDEMIA, UNSPECIFIED HYPERLIPIDEMIA TYPE: ICD-10-CM

## 2023-11-01 DIAGNOSIS — M54.50 CHRONIC MIDLINE LOW BACK PAIN WITHOUT SCIATICA: ICD-10-CM

## 2023-11-01 PROCEDURE — 99214 OFFICE O/P EST MOD 30 MIN: CPT | Performed by: INTERNAL MEDICINE

## 2023-11-01 NOTE — PROGRESS NOTES
Chief Complaint   Patient presents with    Follow-up     3 month follow up chronic medical problems       History of Present Illness      The patient presents for a follow-up related to hypertension. The patient reports that she has had no headaches, chest pain, dyspnea, edema, syncope, blurred vision or palpitations. She states that she is taking her medication as prescribed. She is not having medication side effects.    The patient presents for a follow-up related to hyperlipidemia. She is following a low fat diet. She reports that she is exercising. She is not taking medication for hyperlipidemia. She denies orthopnea, paroxysmal nocturnal dyspnea or dyspnea on exertion.    The patient presents for a follow-up related to GERD. The patient is on famotidine and pantoprazole for her gastroesophageal reflux. The medication is taken on a regular basis and gives complete relief of the symptoms. She reports no abdominal pain, belching, diarrhea, dysphagia, early satiety, heartburn, hoarseness, nausea, odynophagia, rectal bleeding, vomiting or weight loss. The GERD has no known aggravating factors.    The patient presents for follow-up of low back pain. She reports that the pain is stable. The current treatment consists of tramadol and gabapentin.    Medications      Current Outpatient Medications:     acetaminophen (TYLENOL) 650 MG 8 hr tablet, Take 2 tablets by mouth Every 8 (Eight) Hours As Needed for Mild Pain., Disp: , Rfl:     AMITIZA 24 MCG capsule, Take 1 capsule by mouth 2 (Two) Times a Day., Disp: , Rfl:     B Complex Vitamins (VITAMIN B COMPLEX PO), Take 1 tablet by mouth Daily., Disp: , Rfl:     Calcium-Magnesium-Vitamin D (CALCIUM MAGNESIUM PO), Take  by mouth., Disp: , Rfl:     custom compounded cream, Apply  topically to the appropriate area as directed. CUSTOM COMPOUNDED RECTAL SUPPOSITORIES BELLADONNA/MORPHINE SULF 16.2/5MG  INSERT 1 SUPPOSITORY TWICE DAILY AS NEEDED, Disp: , Rfl:     custom compounded  cream, Apply  topically to the appropriate area as directed. CUSTOM COMPOUNDED RECTAL SUPPOSITORY HYDROCORTISONE/LIDOCAINE 2.5% / 2% INSERT 1 SUPPOSITORY TWICE DAILY AS NEEDED, Disp: , Rfl:     DHA-EPA-Vitamin E (OMEGA-3 COMPLEX PO), Take 1 capsule by mouth Daily., Disp: , Rfl:     diazePAM (DIASTAT) 2.5 MG gel, Insert 5 mg into the rectum At Night As Needed., Disp: , Rfl:     diclofenac (VOLTAREN) 1 % gel gel, Apply four grams topically to affected areas four times a day as needed for pain., Disp: 100 g, Rfl: 1    Dietary Management Product (VASCULERA PO), Take 1 tablet by mouth 2 (Two) Times a Day., Disp: , Rfl:     Diphenhydramine-APAP, sleep, (TYLENOL PM EXTRA STRENGTH PO), Take 2 tablets by mouth At Night As Needed., Disp: , Rfl:     famotidine (PEPCID) 20 MG tablet, Take 1 tablet by mouth 2 (Two) Times a Day As Needed., Disp: , Rfl:     fluticasone (FLONASE) 50 MCG/ACT nasal spray, 2 sprays into the nostril(s) as directed by provider Daily. (Patient taking differently: 2 sprays into the nostril(s) as directed by provider Daily As Needed.), Disp: 16 g, Rfl: 3    gabapentin (NEURONTIN) 100 MG capsule, Take 1 capsule by mouth 3 (Three) Times a Day., Disp: 90 capsule, Rfl: 2    gabapentin (NEURONTIN) 300 MG capsule, Take 1 capsule by mouth 5 (Five) Times a Day., Disp: , Rfl:     Lidocaine, Anorectal, (LMX 5) 5 % cream cream, Apply  topically 4 (Four) Times a Day As Needed., Disp: , Rfl:     meclizine (ANTIVERT) 12.5 MG tablet, Take 1 tablet by mouth Every 6 (Six) Hours As Needed., Disp: , Rfl:     metroNIDAZOLE (Metrogel) 1 % gel, Apply  topically to the appropriate area as directed Daily., Disp: 60 g, Rfl: 2    montelukast (Singulair) 10 MG tablet, Take 1 tablet by mouth Every Night., Disp: 90 tablet, Rfl: 1    pantoprazole (PROTONIX) 40 MG EC tablet, Take 1 tablet by mouth Daily., Disp: 90 tablet, Rfl: 1    polyethylene glycol (MIRALAX) powder, Take 17 g by mouth Daily As Needed (constipation)., Disp: , Rfl:      Probiotic Product (PROBIOTIC DAILY PO), Take 1 tablet by mouth Daily., Disp: , Rfl:     raloxifene (EVISTA) 60 MG tablet, TAKE ONE TABLET BY MOUTH DAILY, Disp: 90 tablet, Rfl: 1    Simethicone (GAS-X PO), Take  by mouth., Disp: , Rfl:     traMADol (ULTRAM) 50 MG tablet, Take 1-2 tablets by mouth Every 6 (Six) Hours As Needed for Moderate Pain., Disp: 240 tablet, Rfl: 1    triamterene-hydrochlorothiazide (DYAZIDE) 37.5-25 MG per capsule, Take 1 capsule by mouth Daily., Disp: , Rfl:     VITAMIN D, CHOLECALCIFEROL, PO, Take  by mouth Daily., Disp: , Rfl:     Wheat Dextrin (BENEFIBER DRINK MIX PO), Take  by mouth Daily., Disp: , Rfl:      Allergies    Allergies   Allergen Reactions    Motrin [Ibuprofen] Nausea Only       Problem List    Patient Active Problem List   Diagnosis    Rectal pain    S/P hemorrhoidectomy 11/1/16    Urinary retention    Acute urinary retention    Chronic low back pain    Osteopenia of neck of femur    Heart murmur       Medications, Allergies, Problems List and Past History were reviewed and updated.    Physical Examination    /56 (BP Location: Right arm, Patient Position: Sitting, Cuff Size: Adult)   Pulse 72   Temp 98 °F (36.7 °C) (Infrared)   Resp 16   Wt 57.2 kg (126 lb)   LMP  (LMP Unknown)   BMI 24.40 kg/m²       HEENT: Facies- Within normal limits. Lids- Normal bilaterally. Conjunctiva- Clear bilaterally. Sclera- Anicteric bilaterally.    Neck: Thyroid- non enlarged, symmetric and has no nodules. No bruits are detected.    Lungs: Auscultation- Clear to auscultation bilaterally. There are no retractions, clubbing or cyanosis. The Expiratory to Inspiratory ratio is equal.    Cardiovascular: There are no carotid bruits. Heart- Normal Rate with Regular rhythm and no murmurs. There are no gallops. There are no rubs. In the lower extremities there is no edema. The upper extremities do not have edema.    Abdomen: Soft, benign, non-tender with no masses, hernias, organomegaly or  scars.    Back: The patient has a normal spinal curvature with no evidence of scoliosis. There are no skin lesions noted on the back. Palpation reveals no tenderness and normal muscle tone. The straight leg raising test is negative. The back has normal flexion, extension, rotation, and lateral bending.    Impression and Assessment    Essential Hypertension.    Hyperlipidemia.    Gastroesophageal Reflux Disease.    Low Back Pain.    Plan    Gastroesophageal Reflux Disease Plan: The patient was instructed to continue the current medications.    Essential Hypertension Plan: The patient was instructed to continue the current medications.    Hyperlipidemia Plan: The patient was instructed to exercise daily and eat a low fat diet.    Low Back Pain Plan: The patient was instructed to continue the current medications.    Diagnoses and all orders for this visit:    1. Essential hypertension (Primary)    2. Gastroesophageal reflux disease without esophagitis    3. Hyperlipidemia, unspecified hyperlipidemia type    4. Chronic midline low back pain without sciatica          Return to Office    The patient was instructed to return for follow-up in 4 months. The patient was instructed to return sooner if the condition changes, worsens, or does not resolve.

## 2023-11-30 DIAGNOSIS — M54.50 CHRONIC LOW BACK PAIN WITHOUT SCIATICA, UNSPECIFIED BACK PAIN LATERALITY: ICD-10-CM

## 2023-11-30 DIAGNOSIS — G89.29 CHRONIC LOW BACK PAIN WITHOUT SCIATICA, UNSPECIFIED BACK PAIN LATERALITY: ICD-10-CM

## 2023-11-30 RX ORDER — TRAMADOL HYDROCHLORIDE 50 MG/1
50-100 TABLET ORAL EVERY 6 HOURS PRN
Qty: 240 TABLET | Refills: 1 | Status: SHIPPED | OUTPATIENT
Start: 2023-11-30

## 2023-12-10 DIAGNOSIS — J30.89 NON-SEASONAL ALLERGIC RHINITIS DUE TO OTHER ALLERGIC TRIGGER: ICD-10-CM

## 2023-12-11 RX ORDER — MONTELUKAST SODIUM 10 MG/1
10 TABLET ORAL NIGHTLY
Qty: 90 TABLET | Refills: 1 | Status: SHIPPED | OUTPATIENT
Start: 2023-12-11

## 2023-12-11 NOTE — TELEPHONE ENCOUNTER
LOV for chronic condition 11/01/23  NOV  03/04/24   Nursing notes reviewed and accepted.    Yumi Gore is a 13 year old female who presents for well child exam.  Patient presents with Mother.    Diet/feeding:  Patient eats and drinks okay overall.  Picky with fruits and veggies.  Mainly prefers processed foods.  Drinking some milk but mostly water and juice  Elimination Patterns:  Denies any problems  Sleep:  Sleeping fine throughout the night  Social/School: In 7th grade at [employer].  Mom has not been aware of any social or academic difficulties.  Lives with parents and many siblings    Not menstrating yet    Concerns raised today include: refill of eczema cream    Medical history, surgical history, and family history reviewed and updated.    PHYSICAL EXAM:  Blood pressure 120/64, pulse 88, temperature 98.6 °F (37 °C), temperature source Temporal, resp. rate (!) 24, height 4' 10.75\" (1.492 m), weight 51.5 kg.  GENERAL:  Well appearing female, nontoxic, no acute distress.  Alert and interactive.  SKIN: Warm, normal turgor.  No cyanosis.  Small pustule at right ear, scattered dry eczematous patches and plaques on extremities and trunk  HEAD:  Normocephalic, atraumatic.    EYES:  Conjunctivae without injection or icterus.  Pupils equal, round, reactive to light; extraocular movements intact.  NOSE: No flaring.  EARS:  Tympanic membranes transparent with good landmarks.  THROAT:  Oropharynx with moist mucous membranes and no lesions.  There are multiple dark marks on the molars, both upper and lower  NECK:  Supple, no lymphadenopathy or masses.  HEART:  Regular rate and rhythm.  Quiet precordium.  Normal S1, S2.  No murmurs, rubs, gallops.   LUNGS:  Clear to auscultation bilaterally.  No wheezes, rales, rhonchi.  Normal work of breathing.  ABDOMEN:  Soft, nontender.  No organomegaly or masses.  GENITOURINARY:  Deferred      EXTREMITIES:  Warm, dry, without abnormalities.  NEUROLOGIC:  Normal tone, bulk, strength.    ASSESSMENT:  13 year old female well child.  Skin  pustule  Atopic dermatitis  Dental cavities    PLAN:  1. Continue to keep skin well moisturized and using triamcinolone ointment only as needed  2. Script sent for mupirocin ointment to be applied 3 times per day until skin pustule heals  3. Recommend vitamin-D supplementation  4. Recommend scheduling dental evaluation  5. Immunization counseling provided at this visit for flu shot.  Discussed with parent/guardian the most common risks of the immunizations given today and the benefits provided by the immunizations.  All questions addressed with parents approving of the immunizations that will be administered.      All parental concerns and questions discussed.  Anticipatory guidance provided, handout given.              Safety/car/bicycle/fire/sharp objects/falls/water              Development              Discipline              Diet              Television              Analgesics/antipyretics              Sun exposure              Tobacco-free home              Dental care                            Immunizations per orders.  Risks, benefits, and side effects discussed.  Return to clinic in 1 year for well child examination or sooner prn illness/concerns.

## 2023-12-26 DIAGNOSIS — G89.29 CHRONIC LOW BACK PAIN WITHOUT SCIATICA, UNSPECIFIED BACK PAIN LATERALITY: ICD-10-CM

## 2023-12-26 DIAGNOSIS — M54.50 CHRONIC LOW BACK PAIN WITHOUT SCIATICA, UNSPECIFIED BACK PAIN LATERALITY: ICD-10-CM

## 2023-12-26 RX ORDER — TRAMADOL HYDROCHLORIDE 50 MG/1
50-100 TABLET ORAL EVERY 6 HOURS PRN
Qty: 240 TABLET | Refills: 1 | Status: SHIPPED | OUTPATIENT
Start: 2023-12-26

## 2023-12-26 NOTE — TELEPHONE ENCOUNTER
Last office visit (LOV) for chronic conditions 11/01/23  Next office visit (NOV) 03/04/24  Urine drug screen (UDS)   Controlled substance agreement (CSA) 03/02/21

## 2024-02-10 DIAGNOSIS — M54.50 CHRONIC LOW BACK PAIN WITHOUT SCIATICA, UNSPECIFIED BACK PAIN LATERALITY: ICD-10-CM

## 2024-02-10 DIAGNOSIS — G89.29 CHRONIC LOW BACK PAIN WITHOUT SCIATICA, UNSPECIFIED BACK PAIN LATERALITY: ICD-10-CM

## 2024-02-12 RX ORDER — TRAMADOL HYDROCHLORIDE 50 MG/1
50-100 TABLET ORAL EVERY 6 HOURS PRN
Qty: 240 TABLET | Refills: 0 | Status: SHIPPED | OUTPATIENT
Start: 2024-02-12

## 2024-02-26 RX ORDER — RALOXIFENE HYDROCHLORIDE 60 MG/1
60 TABLET, FILM COATED ORAL DAILY
Qty: 90 TABLET | Refills: 1 | Status: SHIPPED | OUTPATIENT
Start: 2024-02-26

## 2024-03-04 ENCOUNTER — OFFICE VISIT (OUTPATIENT)
Dept: INTERNAL MEDICINE | Facility: CLINIC | Age: 79
End: 2024-03-04
Payer: MEDICARE

## 2024-03-04 VITALS
DIASTOLIC BLOOD PRESSURE: 58 MMHG | HEART RATE: 72 BPM | WEIGHT: 129 LBS | RESPIRATION RATE: 16 BRPM | BODY MASS INDEX: 24.98 KG/M2 | TEMPERATURE: 98 F | SYSTOLIC BLOOD PRESSURE: 112 MMHG

## 2024-03-04 DIAGNOSIS — M54.50 CHRONIC LOW BACK PAIN WITHOUT SCIATICA, UNSPECIFIED BACK PAIN LATERALITY: Primary | ICD-10-CM

## 2024-03-04 DIAGNOSIS — E78.5 HYPERLIPIDEMIA, UNSPECIFIED HYPERLIPIDEMIA TYPE: ICD-10-CM

## 2024-03-04 DIAGNOSIS — I10 ESSENTIAL HYPERTENSION: ICD-10-CM

## 2024-03-04 DIAGNOSIS — R32 URINARY INCONTINENCE, UNSPECIFIED TYPE: ICD-10-CM

## 2024-03-04 DIAGNOSIS — Z79.899 HIGH RISK MEDICATION USE: ICD-10-CM

## 2024-03-04 DIAGNOSIS — G89.29 CHRONIC LOW BACK PAIN WITHOUT SCIATICA, UNSPECIFIED BACK PAIN LATERALITY: Primary | ICD-10-CM

## 2024-03-04 DIAGNOSIS — K21.9 GASTROESOPHAGEAL REFLUX DISEASE WITHOUT ESOPHAGITIS: ICD-10-CM

## 2024-03-04 LAB
BILIRUB BLD-MCNC: NEGATIVE MG/DL
CLARITY, POC: CLEAR
COLOR UR: YELLOW
EXPIRATION DATE: NORMAL
GLUCOSE UR STRIP-MCNC: NEGATIVE MG/DL
KETONES UR QL: NEGATIVE
LEUKOCYTE EST, POC: NEGATIVE
Lab: NORMAL
NITRITE UR-MCNC: NEGATIVE MG/ML
PH UR: 7 [PH] (ref 5–8)
PROT UR STRIP-MCNC: NEGATIVE MG/DL
RBC # UR STRIP: NEGATIVE /UL
SP GR UR: 1 (ref 1–1.03)
UROBILINOGEN UR QL: NORMAL

## 2024-03-04 PROCEDURE — 99214 OFFICE O/P EST MOD 30 MIN: CPT | Performed by: INTERNAL MEDICINE

## 2024-03-04 PROCEDURE — 36415 COLL VENOUS BLD VENIPUNCTURE: CPT | Performed by: INTERNAL MEDICINE

## 2024-03-04 PROCEDURE — 80061 LIPID PANEL: CPT | Performed by: INTERNAL MEDICINE

## 2024-03-04 PROCEDURE — 80053 COMPREHEN METABOLIC PANEL: CPT | Performed by: INTERNAL MEDICINE

## 2024-03-04 NOTE — PROGRESS NOTES
Chief Complaint   Patient presents with    Follow-up     4 month follow up chronic medical problems       History of Present Illness      The patient presents for a follow-up related to hyperlipidemia. She is following a low fat diet. She reports that she is exercising. She is not taking medication for hyperlipidemia. She denies chest pain, shortness of breath, orthopnea, paroxysmal nocturnal dyspnea, dyspnea on exertion, edema, palpitations or syncope.    The patient presents for a follow-up related to hypertension. The patient reports that she has had no headaches or blurred vision. She states that she is taking her medication as prescribed. She is not having medication side effects.    The patient presents for a follow-up related to GERD. The patient is on pantoprazole for her gastroesophageal reflux. The medication is taken on a regular basis and gives complete relief of the symptoms. She reports no abdominal pain, belching, diarrhea, dysphagia, early satiety, heartburn, hoarseness, nausea, odynophagia, rectal bleeding, vomiting or weight loss. The GERD has no known aggravating factors.    The patient presents for follow-up of low back pain. She reports that the pain is stable. The current treatment consists of gabapentin and tramadol.  She does not have associated fever, chills, weight loss, rashes, dysuria, hematuria, or a decreased urine stream. The pain does not radiate. There is no numbness. The patient denies loss of bladder control. The patient denies loss of bowel control. The patient reports no aggravating factors.    Medications      Current Outpatient Medications:     AMITIZA 24 MCG capsule, Take 1 capsule by mouth 2 (Two) Times a Day., Disp: , Rfl:     B Complex Vitamins (VITAMIN B COMPLEX PO), Take 1 tablet by mouth Daily., Disp: , Rfl:     Calcium-Magnesium-Vitamin D (CALCIUM MAGNESIUM PO), Take  by mouth., Disp: , Rfl:     custom compounded cream, Apply  topically to the appropriate area as  directed. CUSTOM COMPOUNDED RECTAL SUPPOSITORIES BELLADONNA/MORPHINE SULF 16.2/5MG  INSERT 1 SUPPOSITORY TWICE DAILY AS NEEDED, Disp: , Rfl:     custom compounded cream, Apply  topically to the appropriate area as directed. CUSTOM COMPOUNDED RECTAL SUPPOSITORY HYDROCORTISONE/LIDOCAINE 2.5% / 2% INSERT 1 SUPPOSITORY TWICE DAILY AS NEEDED, Disp: , Rfl:     DHA-EPA-Vitamin E (OMEGA-3 COMPLEX PO), Take 1 capsule by mouth Daily., Disp: , Rfl:     diazePAM (DIASTAT) 2.5 MG gel, Insert 5 mg into the rectum At Night As Needed., Disp: , Rfl:     diclofenac (VOLTAREN) 1 % gel gel, Apply four grams topically to affected areas four times a day as needed for pain., Disp: 100 g, Rfl: 1    Dietary Management Product (VASCULERA PO), Take 1 tablet by mouth 2 (Two) Times a Day., Disp: , Rfl:     Diphenhydramine-APAP, sleep, (TYLENOL PM EXTRA STRENGTH PO), Take 2 tablets by mouth At Night As Needed., Disp: , Rfl:     famotidine (PEPCID) 20 MG tablet, Take 1 tablet by mouth 2 (Two) Times a Day As Needed., Disp: , Rfl:     fluticasone (FLONASE) 50 MCG/ACT nasal spray, 2 sprays into the nostril(s) as directed by provider Daily. (Patient taking differently: 2 sprays into the nostril(s) as directed by provider Daily As Needed.), Disp: 16 g, Rfl: 3    gabapentin (NEURONTIN) 100 MG capsule, Take 1 capsule by mouth 3 (Three) Times a Day., Disp: 90 capsule, Rfl: 2    gabapentin (NEURONTIN) 300 MG capsule, Take 1 capsule by mouth 5 (Five) Times a Day., Disp: , Rfl:     Lidocaine, Anorectal, (LMX 5) 5 % cream cream, Apply  topically 4 (Four) Times a Day As Needed., Disp: , Rfl:     meclizine (ANTIVERT) 12.5 MG tablet, Take 1 tablet by mouth Every 6 (Six) Hours As Needed., Disp: , Rfl:     metroNIDAZOLE (Metrogel) 1 % gel, Apply  topically to the appropriate area as directed Daily., Disp: 60 g, Rfl: 2    montelukast (SINGULAIR) 10 MG tablet, TAKE ONE TABLET BY MOUTH ONCE NIGHTLY, Disp: 90 tablet, Rfl: 1    pantoprazole (PROTONIX) 40 MG EC tablet,  Take 1 tablet by mouth Daily., Disp: 90 tablet, Rfl: 1    polyethylene glycol (MIRALAX) powder, Take 17 g by mouth Daily As Needed (constipation)., Disp: , Rfl:     Probiotic Product (PROBIOTIC DAILY PO), Take 1 tablet by mouth Daily., Disp: , Rfl:     raloxifene (EVISTA) 60 MG tablet, TAKE 1 TABLET BY MOUTH DAILY, Disp: 90 tablet, Rfl: 1    Simethicone (GAS-X PO), Take  by mouth., Disp: , Rfl:     traMADol (ULTRAM) 50 MG tablet, Take 1-2 tablets by mouth Every 6 (Six) Hours As Needed for Moderate Pain., Disp: 240 tablet, Rfl: 0    triamterene-hydrochlorothiazide (DYAZIDE) 37.5-25 MG per capsule, Take 1 capsule by mouth Daily., Disp: , Rfl:     VITAMIN D, CHOLECALCIFEROL, PO, Take  by mouth Daily., Disp: , Rfl:     Wheat Dextrin (BENEFIBER DRINK MIX PO), Take  by mouth Daily., Disp: , Rfl:      Allergies    Allergies   Allergen Reactions    Motrin [Ibuprofen] Nausea Only       Problem List    Patient Active Problem List   Diagnosis    Rectal pain    S/P hemorrhoidectomy 11/1/16    Urinary retention    Acute urinary retention    Chronic low back pain    Osteopenia of neck of femur    Heart murmur       Medications, Allergies, Problems List and Past History were reviewed and updated.    Physical Examination    /58 (BP Location: Left arm, Patient Position: Sitting, Cuff Size: Adult)   Pulse 72   Temp 98 °F (36.7 °C) (Infrared)   Resp 16   Wt 58.5 kg (129 lb)   LMP  (LMP Unknown)   BMI 24.98 kg/m²       HEENT: Facies- Within normal limits. Lids- Normal bilaterally. Conjunctiva- Clear bilaterally. Sclera- Anicteric bilaterally.    Neck: Thyroid- non enlarged, symmetric and has no nodules. No bruits are detected. ROM- Normal Range of Motion with no rigidity.    Lungs: Auscultation- Clear to auscultation bilaterally. There are no retractions, clubbing or cyanosis. The Expiratory to Inspiratory ratio is equal.    Cardiovascular: There are no carotid bruits. Heart- Normal Rate with Regular rhythm and no murmurs.  There are no gallops. There are no rubs. In the lower extremities there is no edema. The upper extremities do not have edema.    Abdomen: Soft, benign, non-tender with no masses, hernias, organomegaly or scars.    Back: The patient has a normal spinal curvature with no evidence of scoliosis. There are no skin lesions noted on the back. Palpation reveals no tenderness and normal muscle tone. The straight leg raising test is negative. The back has normal flexion, extension, rotation, and lateral bending.    Impression and Assessment    Hyperlipidemia.    Essential Hypertension.    Gastroesophageal Reflux Disease.    Low Back Pain.    Plan    Gastroesophageal Reflux Disease Plan: The patient was instructed to continue the current medications.    Hyperlipidemia Plan: The patient was instructed to exercise daily and eat a low fat diet.    Essential Hypertension Plan: The patient was instructed to continue the current medications.    Low Back Pain Plan: The patient was instructed to continue the current medications.    Diagnoses and all orders for this visit:    1. Chronic low back pain without sciatica, unspecified back pain laterality (Primary)  -     ToxAssure Flex 23, Ur -; Future    2. Essential hypertension  -     Comprehensive Metabolic Panel; Future  -     POC Urinalysis Dipstick, Automated; Future    3. Gastroesophageal reflux disease without esophagitis    4. Hyperlipidemia, unspecified hyperlipidemia type  -     Comprehensive Metabolic Panel; Future  -     Lipid Panel; Future    5. High risk medication use  -     ToxAssure Flex 23, Ur -; Future        Return to Office    The patient was instructed to return for follow-up in 4 months. The patient was instructed to return sooner if the condition changes, worsens, or does not resolve.

## 2024-03-05 LAB
ALBUMIN SERPL-MCNC: 3.8 G/DL (ref 3.5–5.2)
ALBUMIN/GLOB SERPL: 1.4 G/DL
ALP SERPL-CCNC: 39 U/L (ref 39–117)
ALT SERPL W P-5'-P-CCNC: 11 U/L (ref 1–33)
ANION GAP SERPL CALCULATED.3IONS-SCNC: 11.8 MMOL/L (ref 5–15)
AST SERPL-CCNC: 18 U/L (ref 1–32)
BILIRUB SERPL-MCNC: <0.2 MG/DL (ref 0–1.2)
BUN SERPL-MCNC: 15 MG/DL (ref 8–23)
BUN/CREAT SERPL: 17 (ref 7–25)
CALCIUM SPEC-SCNC: 8.7 MG/DL (ref 8.6–10.5)
CHLORIDE SERPL-SCNC: 98 MMOL/L (ref 98–107)
CHOLEST SERPL-MCNC: 204 MG/DL (ref 0–200)
CO2 SERPL-SCNC: 26.2 MMOL/L (ref 22–29)
CREAT SERPL-MCNC: 0.88 MG/DL (ref 0.57–1)
EGFRCR SERPLBLD CKD-EPI 2021: 67.4 ML/MIN/1.73
GLOBULIN UR ELPH-MCNC: 2.7 GM/DL
GLUCOSE SERPL-MCNC: 80 MG/DL (ref 65–99)
HDLC SERPL-MCNC: 94 MG/DL (ref 40–60)
LDLC SERPL CALC-MCNC: 96 MG/DL (ref 0–100)
LDLC/HDLC SERPL: 1.01 {RATIO}
POTASSIUM SERPL-SCNC: 3.8 MMOL/L (ref 3.5–5.2)
PROT SERPL-MCNC: 6.5 G/DL (ref 6–8.5)
SODIUM SERPL-SCNC: 136 MMOL/L (ref 136–145)
TRIGL SERPL-MCNC: 77 MG/DL (ref 0–150)
VLDLC SERPL-MCNC: 14 MG/DL (ref 5–40)

## 2024-03-07 LAB
1OH-MIDAZOLAM UR QL SCN: NOT DETECTED NG/MG CREAT
6MAM UR QL SCN: NEGATIVE NG/ML
7AMINOCLONAZEPAM/CREAT UR: NOT DETECTED NG/MG CREAT
A-OH ALPRAZ/CREAT UR: NOT DETECTED NG/MG CREAT
A-OH-TRIAZOLAM/CREAT UR CFM: NOT DETECTED NG/MG CREAT
ACP UR QL CFM: NOT DETECTED
ALPRAZ/CREAT UR CFM: NOT DETECTED NG/MG CREAT
AMPHETAMINES UR QL SCN: NEGATIVE NG/ML
APAP UR QL SCN: NORMAL UG/ML
APAP UR QL: NORMAL
APAP UR-MCNC: PRESENT UG/ML
BARBITURATES UR QL SCN: NEGATIVE NG/ML
BENZODIAZ SCN METH UR: NEGATIVE
BUPRENORPHINE UR QL SCN: NEGATIVE
BUPRENORPHINE/CREAT UR: NOT DETECTED NG/MG CREAT
CANNABINOIDS UR QL SCN: NEGATIVE NG/ML
CARISOPRODOL UR QL: NEGATIVE NG/ML
CLONAZEPAM/CREAT UR CFM: NOT DETECTED NG/MG CREAT
COCAINE+BZE UR QL SCN: NEGATIVE NG/ML
CREAT UR-MCNC: 34 MG/DL
D-METHORPHAN UR-MCNC: NOT DETECTED NG/ML
D-METHORPHAN+LEVORPHANOL UR QL: NOT DETECTED
DESALKYLFLURAZ/CREAT UR: NOT DETECTED NG/MG CREAT
DIAZEPAM/CREAT UR: NOT DETECTED NG/MG CREAT
ETHANOL UR QL SCN: NEGATIVE G/DL
ETHANOL UR QL SCN: NEGATIVE NG/ML
FENTANYL CTO UR SCN-MCNC: NEGATIVE NG/ML
FENTANYL/CREAT UR: NOT DETECTED NG/MG CREAT
FLUNITRAZEPAM UR QL SCN: NOT DETECTED NG/MG CREAT
GABAPENTIN UR CFM-MCNC: PRESENT NG/ML
GABAPENTIN UR QL CFM: NORMAL
GABAPENTIN UR-MCNC: NORMAL UG/ML
HYPNOTICS UR QL SCN: NEGATIVE
KETAMINE UR QL: NOT DETECTED
LORAZEPAM/CREAT UR: NOT DETECTED NG/MG CREAT
MEPERIDINE UR QL SCN: NEGATIVE NG/ML
METHADONE UR QL SCN: NEGATIVE NG/ML
METHADONE+METAB UR QL SCN: NEGATIVE NG/ML
MIDAZOLAM/CREAT UR CFM: NOT DETECTED NG/MG CREAT
MISCELLANEOUS, UR: NEGATIVE
N-NORTRAMADOL/CREAT UR CFM: NORMAL NG/MG CREAT
NORBUPRENORPHINE/CREAT UR: NOT DETECTED NG/MG CREAT
NORDIAZEPAM/CREAT UR: NOT DETECTED NG/MG CREAT
NORFENTANYL/CREAT UR: NOT DETECTED NG/MG CREAT
NORFLUNITRAZEPAM UR-MCNC: NOT DETECTED NG/MG CREAT
NORKETAMINE UR-MCNC: NOT DETECTED UG/ML
O-NORTRAMADOL UR CFM-MCNC: NORMAL NG/MG CREAT
OPIATES UR SCN-MCNC: NEGATIVE NG/ML
OTHER HALLUCINOGENS UR: NEGATIVE
OXAZEPAM/CREAT UR: NOT DETECTED NG/MG CREAT
OXYCODONE CTO UR SCN-MCNC: NEGATIVE NG/ML
PCP UR QL SCN: NEGATIVE NG/ML
PRESCRIBED MEDICATIONS: NORMAL
PROPOXYPH UR QL SCN: NEGATIVE NG/ML
TAPENTADOL CTO UR SCN-MCNC: NEGATIVE NG/ML
TEMAZEPAM/CREAT UR: NOT DETECTED NG/MG CREAT
TRAMADOL UR CFM-MCNC: NORMAL NG/MG CREAT
TRAMADOL UR QL CFM: NORMAL
TRAMADOL UR QL SCN: NORMAL NG/ML
ZALEPLON UR-MCNC: NOT DETECTED NG/ML
ZOLPIDEM PHENYL-4-CARB UR QL SCN: NOT DETECTED
ZOLPIDEM UR QL SCN: NOT DETECTED
ZOPICLONE-N-OXIDE UR-MCNC: NOT DETECTED NG/ML

## 2024-03-15 ENCOUNTER — LAB (OUTPATIENT)
Dept: LAB | Facility: HOSPITAL | Age: 79
End: 2024-03-15
Payer: MEDICARE

## 2024-03-15 ENCOUNTER — TRANSCRIBE ORDERS (OUTPATIENT)
Dept: CARDIOLOGY | Facility: HOSPITAL | Age: 79
End: 2024-03-15
Payer: MEDICARE

## 2024-03-15 ENCOUNTER — TRANSCRIBE ORDERS (OUTPATIENT)
Dept: LAB | Facility: HOSPITAL | Age: 79
End: 2024-03-15
Payer: MEDICARE

## 2024-03-15 ENCOUNTER — HOSPITAL ENCOUNTER (OUTPATIENT)
Dept: CARDIOLOGY | Facility: HOSPITAL | Age: 79
Discharge: HOME | End: 2024-03-15
Payer: MEDICARE

## 2024-03-15 DIAGNOSIS — K62.89 ANAL PAIN: Primary | ICD-10-CM

## 2024-03-15 DIAGNOSIS — K62.89 ANAL OR RECTAL PAIN: Primary | ICD-10-CM

## 2024-03-15 DIAGNOSIS — K62.89 ANAL PAIN: ICD-10-CM

## 2024-03-15 DIAGNOSIS — K62.89 ANAL OR RECTAL PAIN: ICD-10-CM

## 2024-03-15 LAB
ANION GAP SERPL CALCULATED.3IONS-SCNC: 13 MMOL/L (ref 5–15)
BASOPHILS # BLD AUTO: 0.04 10*3/MM3 (ref 0–0.2)
BASOPHILS NFR BLD AUTO: 0.7 % (ref 0–1.5)
BUN SERPL-MCNC: 15 MG/DL (ref 8–23)
BUN/CREAT SERPL: 15.2 (ref 7–25)
CALCIUM SPEC-SCNC: 8.8 MG/DL (ref 8.6–10.5)
CHLORIDE SERPL-SCNC: 91 MMOL/L (ref 98–107)
CO2 SERPL-SCNC: 29 MMOL/L (ref 22–29)
CREAT SERPL-MCNC: 0.99 MG/DL (ref 0.57–1)
DEPRECATED RDW RBC AUTO: 40.9 FL (ref 37–54)
EGFRCR SERPLBLD CKD-EPI 2021: 58.5 ML/MIN/1.73
EOSINOPHIL # BLD AUTO: 0.02 10*3/MM3 (ref 0–0.4)
EOSINOPHIL NFR BLD AUTO: 0.3 % (ref 0.3–6.2)
ERYTHROCYTE [DISTWIDTH] IN BLOOD BY AUTOMATED COUNT: 14 % (ref 12.3–15.4)
GLUCOSE SERPL-MCNC: 90 MG/DL (ref 65–99)
HCT VFR BLD AUTO: 31.9 % (ref 34–46.6)
HGB BLD-MCNC: 10.5 G/DL (ref 12–15.9)
IMM GRANULOCYTES # BLD AUTO: 0.02 10*3/MM3 (ref 0–0.05)
IMM GRANULOCYTES NFR BLD AUTO: 0.3 % (ref 0–0.5)
LYMPHOCYTES # BLD AUTO: 1.44 10*3/MM3 (ref 0.7–3.1)
LYMPHOCYTES NFR BLD AUTO: 23.8 % (ref 19.6–45.3)
MCH RBC QN AUTO: 26.7 PG (ref 26.6–33)
MCHC RBC AUTO-ENTMCNC: 32.9 G/DL (ref 31.5–35.7)
MCV RBC AUTO: 81.2 FL (ref 79–97)
MONOCYTES # BLD AUTO: 0.87 10*3/MM3 (ref 0.1–0.9)
MONOCYTES NFR BLD AUTO: 14.4 % (ref 5–12)
NEUTROPHILS NFR BLD AUTO: 3.65 10*3/MM3 (ref 1.7–7)
NEUTROPHILS NFR BLD AUTO: 60.5 % (ref 42.7–76)
NRBC BLD AUTO-RTO: 0 /100 WBC (ref 0–0.2)
PLATELET # BLD AUTO: 381 10*3/MM3 (ref 140–450)
PMV BLD AUTO: 9 FL (ref 6–12)
POTASSIUM SERPL-SCNC: 3.8 MMOL/L (ref 3.5–5.2)
RBC # BLD AUTO: 3.93 10*6/MM3 (ref 3.77–5.28)
SODIUM SERPL-SCNC: 133 MMOL/L (ref 136–145)
WBC NRBC COR # BLD AUTO: 6.04 10*3/MM3 (ref 3.4–10.8)

## 2024-03-15 PROCEDURE — 36415 COLL VENOUS BLD VENIPUNCTURE: CPT

## 2024-03-15 PROCEDURE — 85025 COMPLETE CBC W/AUTO DIFF WBC: CPT

## 2024-03-15 PROCEDURE — 80048 BASIC METABOLIC PNL TOTAL CA: CPT

## 2024-03-15 PROCEDURE — 93005 ELECTROCARDIOGRAM TRACING: CPT | Performed by: COLON & RECTAL SURGERY

## 2024-03-16 DIAGNOSIS — G89.29 CHRONIC LOW BACK PAIN WITHOUT SCIATICA, UNSPECIFIED BACK PAIN LATERALITY: ICD-10-CM

## 2024-03-16 DIAGNOSIS — M54.50 CHRONIC LOW BACK PAIN WITHOUT SCIATICA, UNSPECIFIED BACK PAIN LATERALITY: ICD-10-CM

## 2024-03-18 ENCOUNTER — OFFICE VISIT (OUTPATIENT)
Dept: INTERNAL MEDICINE | Facility: CLINIC | Age: 79
End: 2024-03-18
Payer: MEDICARE

## 2024-03-18 VITALS
RESPIRATION RATE: 16 BRPM | SYSTOLIC BLOOD PRESSURE: 104 MMHG | TEMPERATURE: 98 F | DIASTOLIC BLOOD PRESSURE: 62 MMHG | WEIGHT: 122.5 LBS | BODY MASS INDEX: 23.73 KG/M2 | HEART RATE: 74 BPM

## 2024-03-18 DIAGNOSIS — N39.0 ACUTE UTI: Primary | ICD-10-CM

## 2024-03-18 DIAGNOSIS — R35.0 URINE FREQUENCY: ICD-10-CM

## 2024-03-18 DIAGNOSIS — R31.29 MICROSCOPIC HEMATURIA: ICD-10-CM

## 2024-03-18 LAB
BILIRUB BLD-MCNC: NEGATIVE MG/DL
CLARITY, POC: ABNORMAL
COLOR UR: ABNORMAL
EXPIRATION DATE: ABNORMAL
GLUCOSE UR STRIP-MCNC: NEGATIVE MG/DL
KETONES UR QL: NEGATIVE
LEUKOCYTE EST, POC: ABNORMAL
Lab: ABNORMAL
NITRITE UR-MCNC: NEGATIVE MG/ML
PH UR: 7 [PH] (ref 5–8)
PROT UR STRIP-MCNC: ABNORMAL MG/DL
QT INTERVAL: 400 MS
QTC INTERVAL: 458 MS
RBC # UR STRIP: ABNORMAL /UL
SP GR UR: 1.01 (ref 1–1.03)
UROBILINOGEN UR QL: NORMAL

## 2024-03-18 PROCEDURE — 87086 URINE CULTURE/COLONY COUNT: CPT | Performed by: INTERNAL MEDICINE

## 2024-03-18 PROCEDURE — 81015 MICROSCOPIC EXAM OF URINE: CPT | Performed by: INTERNAL MEDICINE

## 2024-03-18 PROCEDURE — 99213 OFFICE O/P EST LOW 20 MIN: CPT | Performed by: INTERNAL MEDICINE

## 2024-03-18 PROCEDURE — 87077 CULTURE AEROBIC IDENTIFY: CPT | Performed by: INTERNAL MEDICINE

## 2024-03-18 PROCEDURE — 1159F MED LIST DOCD IN RCRD: CPT | Performed by: INTERNAL MEDICINE

## 2024-03-18 PROCEDURE — 81003 URINALYSIS AUTO W/O SCOPE: CPT | Performed by: INTERNAL MEDICINE

## 2024-03-18 PROCEDURE — 1160F RVW MEDS BY RX/DR IN RCRD: CPT | Performed by: INTERNAL MEDICINE

## 2024-03-18 PROCEDURE — 87186 SC STD MICRODIL/AGAR DIL: CPT | Performed by: INTERNAL MEDICINE

## 2024-03-18 RX ORDER — CEFDINIR 300 MG/1
300 CAPSULE ORAL 2 TIMES DAILY
Qty: 20 CAPSULE | Refills: 0 | Status: SHIPPED | OUTPATIENT
Start: 2024-03-18 | End: 2024-03-28

## 2024-03-18 RX ORDER — METHOCARBAMOL 500 MG/1
500 TABLET, FILM COATED ORAL 3 TIMES DAILY
COMMUNITY

## 2024-03-18 RX ORDER — TRAMADOL HYDROCHLORIDE 50 MG/1
50-100 TABLET ORAL EVERY 6 HOURS PRN
Qty: 240 TABLET | Refills: 0 | Status: SHIPPED | OUTPATIENT
Start: 2024-03-18

## 2024-03-18 NOTE — PROGRESS NOTES
Subjective       Brandi Duncan is a 78 y.o. female.     Chief Complaint   Patient presents with    Blood in Urine       History obtained from the patient.      Urinary Tract Infection   This is a new problem. Episode onset: 2-3 days ago. The problem occurs constantly (but waxes and wanes in intensity). The problem has been unchanged. The quality of the pain is described as burning (at end of urethra). The pain is mild. There has been no fever. She is not sexually active. There is no history of pyelonephritis. Associated symptoms include frequency (not new), hematuria (urine is brown and had 2 small drops of maroon discharge on pad), nausea and urgency (occasionally at night, not new). Pertinent negatives include no chills, discharge, flank pain or vomiting. Associated symptoms comments: Has some incomplete emptying and need to strain x 3-4 years, sees Urology    . She has tried nothing for the symptoms. There is no history of catheterization, kidney stones or recurrent UTIs.     She is s/p LAWANDA/BSO.   She started Robaxin 5 days ago and is wondering if that can be causing or contributing to her symptoms.    The following portions of the patient's history were reviewed and updated as appropriate: allergies, current medications, past family history, past medical history, past social history, past surgical history, and problem list.      Review of Systems   Constitutional:  Negative for chills and fever.   Gastrointestinal:  Positive for abdominal pain (RUQ), constipation (chronic) and nausea. Negative for blood in stool, diarrhea and vomiting.        Denies melena   Genitourinary:  Positive for dysuria, frequency (not new), hematuria (urine is brown and had 2 small drops of maroon discharge on pad) and urgency (occasionally at night, not new). Negative for flank pain and pelvic pain.        Has nocturia , every 3 hours, not new   Neurological:  Negative for headaches.   Hematological:  Negative for adenopathy.            Objective     Blood pressure 104/62, pulse 74, temperature 98 °F (36.7 °C), temperature source Infrared, resp. rate 16, weight 55.6 kg (122 lb 8 oz), not currently breastfeeding.    Physical Exam  Vitals and nursing note reviewed.   Constitutional:       Appearance: She is well-developed and normal weight.   Cardiovascular:      Rate and Rhythm: Normal rate and regular rhythm.      Heart sounds: Normal heart sounds. No murmur heard.  Pulmonary:      Effort: Pulmonary effort is normal.      Breath sounds: Normal breath sounds.   Abdominal:      General: Bowel sounds are normal. There is no distension.      Palpations: Abdomen is soft. There is no hepatomegaly, splenomegaly or mass.      Tenderness: There is abdominal tenderness (mild periumbilical/suprapubic). There is no right CVA tenderness, left CVA tenderness, guarding or rebound.   Skin:     Findings: No rash.   Neurological:      Mental Status: She is alert.   Psychiatric:         Mood and Affect: Mood normal.         Results for orders placed or performed in visit on 03/18/24   POC Urinalysis Dipstick, Automated    Specimen: Urine   Result Value Ref Range    Color Dark Yellow Yellow, Straw, Dark Yellow, Jeny    Clarity, UA Slightly Cloudy (A) Clear    Specific Gravity  1.010 1.005 - 1.030    pH, Urine 7.0 5.0 - 8.0    Leukocytes 500 Raina/ul (A) Negative    Nitrite, UA Negative Negative    Protein,  mg/dL (A) Negative mg/dL    Glucose, UA Negative Negative mg/dL    Ketones, UA Negative Negative    Urobilinogen, UA Normal Normal, 0.2 E.U./dL    Bilirubin Negative Negative    Blood,  Jelani/ul (A) Negative    Lot Number [id number]     Expiration Date 10/31/2024          Assessment & Plan   Diagnoses and all orders for this visit:    1. Acute UTI (Primary)  -     cefdinir (OMNICEF) 300 MG capsule; Take 1 capsule by mouth 2 (Two) Times a Day for 10 days.  Dispense: 20 capsule; Refill: 0  -     Urine Culture - Urine, Urine, Clean Catch    2.  Urine frequency  -     POC Urinalysis Dipstick, Automated  -     Urine Culture - Urine, Urine, Clean Catch    3. Microscopic hematuria  -     Urinalysis, Microscopic Only - Urine, Clean Catch        The patient was informed Robaxin should not cause her symptoms.      No follow-ups on file.

## 2024-03-18 NOTE — TELEPHONE ENCOUNTER
Last office visit (LOV) for chronic conditions 03/04/24  Next office visit (NOV) 07/08/24  Urine drug screen (UDS) 03/04/24  Controlled substance agreement (CSA) 03/04/24

## 2024-03-19 LAB
BACTERIA UR QL AUTO: ABNORMAL /HPF
HYALINE CASTS UR QL AUTO: ABNORMAL /LPF
RBC # UR STRIP: ABNORMAL /HPF
REF LAB TEST METHOD: ABNORMAL
SQUAMOUS #/AREA URNS HPF: ABNORMAL /HPF
WBC # UR STRIP: ABNORMAL /HPF

## 2024-03-20 LAB — BACTERIA SPEC AEROBE CULT: ABNORMAL

## 2024-03-22 ENCOUNTER — TELEPHONE (OUTPATIENT)
Dept: INTERNAL MEDICINE | Facility: CLINIC | Age: 79
End: 2024-03-22
Payer: MEDICARE

## 2024-03-22 NOTE — TELEPHONE ENCOUNTER
Caller: Brandi Duncan     Relationship: Self     Best call back number: [phone]     What is the best time to reach you: ANY    Who are you requesting to speak with (clinical staff, provider,  specific staff member): REFERRAL COORDINATOR    What was the call regarding: THE PATIENT IS CALLING BACK SINCE SHE HAS NOT GOTTEN A CALL REGARDING THE [facility]LO[facility] PHYSICAL THERAPY SCHEDULING. THE PATIENT WOULD LIKE A CALL BACK ASAP TO LET HER KNOW IF THERE IS A HOLD UP AND WHAT THE CONTACT INFORMATION FOR THEM IS.     Is it okay if the provider responds through MyChart: NO

## 2024-03-25 NOTE — TELEPHONE ENCOUNTER
I left a voice mail for patient at [phone]  that her referral is in review with our [place] office and she should get a call within a few days, also left her their direct phone number to call for any further update  Voice mail ok per  Verbal

## 2024-04-24 DIAGNOSIS — M54.50 CHRONIC LOW BACK PAIN WITHOUT SCIATICA, UNSPECIFIED BACK PAIN LATERALITY: ICD-10-CM

## 2024-04-24 DIAGNOSIS — G89.29 CHRONIC LOW BACK PAIN WITHOUT SCIATICA, UNSPECIFIED BACK PAIN LATERALITY: ICD-10-CM

## 2024-04-24 RX ORDER — TRAMADOL HYDROCHLORIDE 50 MG/1
50-100 TABLET ORAL EVERY 6 HOURS PRN
Qty: 240 TABLET | Refills: 0 | Status: SHIPPED | OUTPATIENT
Start: 2024-04-24

## 2024-04-24 RX ORDER — PANTOPRAZOLE SODIUM 40 MG/1
40 TABLET, DELAYED RELEASE ORAL DAILY
Qty: 90 TABLET | Refills: 1 | Status: SHIPPED | OUTPATIENT
Start: 2024-04-24

## 2024-05-07 ENCOUNTER — TRANSCRIBE ORDERS (OUTPATIENT)
Dept: ADMINISTRATIVE | Facility: HOSPITAL | Age: 79
End: 2024-05-07
Payer: MEDICARE

## 2024-05-07 DIAGNOSIS — Z12.31 SCREENING MAMMOGRAM FOR BREAST CANCER: Primary | ICD-10-CM

## 2024-05-24 DIAGNOSIS — G89.29 CHRONIC LOW BACK PAIN WITHOUT SCIATICA, UNSPECIFIED BACK PAIN LATERALITY: ICD-10-CM

## 2024-05-24 DIAGNOSIS — M54.50 CHRONIC LOW BACK PAIN WITHOUT SCIATICA, UNSPECIFIED BACK PAIN LATERALITY: ICD-10-CM

## 2024-05-28 RX ORDER — TRAMADOL HYDROCHLORIDE 50 MG/1
50-100 TABLET ORAL EVERY 6 HOURS PRN
Qty: 240 TABLET | Refills: 0 | Status: SHIPPED | OUTPATIENT
Start: 2024-05-28

## 2024-06-06 ENCOUNTER — TRANSCRIBE ORDERS (OUTPATIENT)
Dept: ADMINISTRATIVE | Facility: HOSPITAL | Age: 79
End: 2024-06-06
Payer: MEDICARE

## 2024-06-06 DIAGNOSIS — J30.89 NON-SEASONAL ALLERGIC RHINITIS DUE TO OTHER ALLERGIC TRIGGER: ICD-10-CM

## 2024-06-06 DIAGNOSIS — N31.2 FLACCID NEUROPATHIC BLADDER, NOT ELSEWHERE CLASSIFIED: Primary | ICD-10-CM

## 2024-06-06 RX ORDER — MONTELUKAST SODIUM 10 MG/1
10 TABLET ORAL NIGHTLY
Qty: 90 TABLET | Refills: 1 | Status: SHIPPED | OUTPATIENT
Start: 2024-06-06

## 2024-06-28 ENCOUNTER — HOSPITAL ENCOUNTER (OUTPATIENT)
Dept: ULTRASOUND IMAGING | Facility: HOSPITAL | Age: 79
Discharge: HOME | End: 2024-06-28
Payer: MEDICARE

## 2024-06-28 DIAGNOSIS — N31.2 FLACCID NEUROPATHIC BLADDER, NOT ELSEWHERE CLASSIFIED: ICD-10-CM

## 2024-06-28 PROCEDURE — 76775 US EXAM ABDO BACK WALL LIM: CPT

## 2024-07-01 DIAGNOSIS — M54.50 CHRONIC LOW BACK PAIN WITHOUT SCIATICA, UNSPECIFIED BACK PAIN LATERALITY: ICD-10-CM

## 2024-07-01 DIAGNOSIS — G89.29 CHRONIC LOW BACK PAIN WITHOUT SCIATICA, UNSPECIFIED BACK PAIN LATERALITY: ICD-10-CM

## 2024-07-01 RX ORDER — TRAMADOL HYDROCHLORIDE 50 MG/1
50-100 TABLET ORAL EVERY 6 HOURS PRN
Qty: 240 TABLET | Refills: 0 | Status: SHIPPED | OUTPATIENT
Start: 2024-07-01

## 2024-07-03 ENCOUNTER — OFFICE VISIT (OUTPATIENT)
Dept: INTERNAL MEDICINE | Facility: CLINIC | Age: 79
End: 2024-07-03
Payer: MEDICARE

## 2024-07-03 VITALS
RESPIRATION RATE: 16 BRPM | SYSTOLIC BLOOD PRESSURE: 128 MMHG | HEART RATE: 96 BPM | TEMPERATURE: 97.7 F | BODY MASS INDEX: 24.26 KG/M2 | DIASTOLIC BLOOD PRESSURE: 66 MMHG | WEIGHT: 125.25 LBS

## 2024-07-03 DIAGNOSIS — R35.0 FREQUENT URINATION: Primary | ICD-10-CM

## 2024-07-03 DIAGNOSIS — R82.998 LEUKOCYTES IN URINE: ICD-10-CM

## 2024-07-03 DIAGNOSIS — N30.00 ACUTE CYSTITIS WITHOUT HEMATURIA: ICD-10-CM

## 2024-07-03 LAB
BILIRUB BLD-MCNC: NEGATIVE MG/DL
CLARITY, POC: CLEAR
COLOR UR: YELLOW
EXPIRATION DATE: ABNORMAL
GLUCOSE UR STRIP-MCNC: NEGATIVE MG/DL
KETONES UR QL: NEGATIVE
LEUKOCYTE EST, POC: ABNORMAL
Lab: ABNORMAL
NITRITE UR-MCNC: NEGATIVE MG/ML
PH UR: 7 [PH] (ref 5–8)
PROT UR STRIP-MCNC: NEGATIVE MG/DL
RBC # UR STRIP: NEGATIVE /UL
SP GR UR: 1 (ref 1–1.03)
UROBILINOGEN UR QL: NORMAL

## 2024-07-03 PROCEDURE — 1126F AMNT PAIN NOTED NONE PRSNT: CPT | Performed by: INTERNAL MEDICINE

## 2024-07-03 PROCEDURE — 87086 URINE CULTURE/COLONY COUNT: CPT | Performed by: INTERNAL MEDICINE

## 2024-07-03 PROCEDURE — 81003 URINALYSIS AUTO W/O SCOPE: CPT | Performed by: INTERNAL MEDICINE

## 2024-07-03 PROCEDURE — 99214 OFFICE O/P EST MOD 30 MIN: CPT | Performed by: INTERNAL MEDICINE

## 2024-07-03 RX ORDER — NITROFURANTOIN 25; 75 MG/1; MG/1
100 CAPSULE ORAL 2 TIMES DAILY
Qty: 16 CAPSULE | Refills: 0 | Status: SHIPPED | OUTPATIENT
Start: 2024-07-03

## 2024-07-03 NOTE — PROGRESS NOTES
Chief Complaint  Urinary Tract Infection    Subjective    Brandi Duncan is a 78 y.o. female.     Brandi Duncan presents to [facility] for     History of Present Illness  The patient presents for evaluation of urinary symptoms.    The patient reports experiencing dysuria and urinary frequency for the past 3 to 4 days. Prior to this, she was in good health. She denies the presence of fever, chills, nausea, or vomiting. She has a history of urinary tract infections, with the most recent episode occurring 1 to 2 months ago.       The following portions of the patient's history were reviewed and updated as appropriate: allergies, current medications, past family history, past medical history, past social history, past surgical history, and problem list.    Review of Systems   All other systems reviewed and are negative.    Objective   Body mass index is 24.26 kg/m².  BMI is within normal parameters. No other follow-up for BMI required.       Vital Signs:   /66 (BP Location: Right arm, Patient Position: Sitting, Cuff Size: Adult)   Pulse 96   Temp 97.7 °F (36.5 °C) (Temporal)   Resp 16   Wt 56.8 kg (125 lb 4 oz)   BMI 24.26 kg/m²       Physical Exam  Patient is alert x3, non distressed.  Head is normocephalic and atraumatic. Pupils are equal to light and accommodation. Extraocular muscles are intact.  Lungs are clear to auscultation. No rhonchi or wheeze.  Heart examination reveals S1, S2. No murmurs, rubs, or gallop.  In the posterior back examination, no signs of CVA tenderness.       Results  Laboratory Studies  Urine culture from 3/18/2024 showed E. coli pan sensitive. Urine culture from July 13, 2020 showed mixed josseline. Urine culture from November 9, 2016 showed Enterococcus resistant to streptomycin and tetracycline pan sensitive to others.            Assessment and Plan  Diagnoses and all orders for this visit:  Assessment & Plan  1. Urinary tract  infection.  Upon reviewing the patient's history, physical examination, previous urinary labs, and culture, a treatment plan will be initiated. Given the presence of two different organisms in her urine cultures, Macrobid 100 mg, to be taken orally twice daily for 8 days, will be prescribed. The patient has been advised to increase her fluid intake and monitor her symptoms. Should there be no resolution or return of her symptoms while on the antibiotics or shortly after completion, a follow-up appointment with Dr. Myers or any of the other staff physicians at this practice is recommended.       Diagnoses and all orders for this visit:    1. Frequent urination (Primary)  -     POC Urinalysis Dipstick, Automated    2. Leukocytes in urine  -     Urine Culture - Urine, Urine, Clean Catch; Future  -     Urine Culture - Urine, Urine, Clean Catch    3. Acute cystitis without hematuria  -     nitrofurantoin, macrocrystal-monohydrate, (Macrobid) 100 MG capsule; Take 1 capsule by mouth 2 (Two) Times a Day.  Dispense: 16 capsule; Refill: 0              Follow Up   No follow-ups on file.  Patient was given instructions and counseling regarding her condition or for health maintenance advice. Please see specific information pulled into the AVS if appropriate.     Patient or patient representative verbalized consent for the use of Ambient Listening during the visit with  Fam Nagel MD for chart documentation. 7/3/2024  16:28 EDT

## 2024-07-04 LAB — BACTERIA SPEC AEROBE CULT: NORMAL

## 2024-07-08 ENCOUNTER — OFFICE VISIT (OUTPATIENT)
Dept: INTERNAL MEDICINE | Facility: CLINIC | Age: 79
End: 2024-07-08
Payer: MEDICARE

## 2024-07-08 VITALS
DIASTOLIC BLOOD PRESSURE: 62 MMHG | HEART RATE: 84 BPM | TEMPERATURE: 98 F | RESPIRATION RATE: 16 BRPM | SYSTOLIC BLOOD PRESSURE: 116 MMHG | WEIGHT: 125 LBS | BODY MASS INDEX: 24.21 KG/M2

## 2024-07-08 DIAGNOSIS — I10 ESSENTIAL HYPERTENSION: Primary | ICD-10-CM

## 2024-07-08 DIAGNOSIS — G89.29 CHRONIC MIDLINE LOW BACK PAIN WITHOUT SCIATICA: ICD-10-CM

## 2024-07-08 DIAGNOSIS — K21.9 GASTROESOPHAGEAL REFLUX DISEASE WITHOUT ESOPHAGITIS: ICD-10-CM

## 2024-07-08 DIAGNOSIS — R41.3 MEMORY LOSS: ICD-10-CM

## 2024-07-08 DIAGNOSIS — M54.50 CHRONIC MIDLINE LOW BACK PAIN WITHOUT SCIATICA: ICD-10-CM

## 2024-07-08 DIAGNOSIS — E78.5 HYPERLIPIDEMIA, UNSPECIFIED HYPERLIPIDEMIA TYPE: ICD-10-CM

## 2024-07-08 LAB
NCCN CRITERIA FLAG: ABNORMAL
TYRER CUZICK SCORE: 1.6

## 2024-07-08 PROCEDURE — 80061 LIPID PANEL: CPT | Performed by: INTERNAL MEDICINE

## 2024-07-08 PROCEDURE — 1126F AMNT PAIN NOTED NONE PRSNT: CPT | Performed by: INTERNAL MEDICINE

## 2024-07-08 PROCEDURE — 36415 COLL VENOUS BLD VENIPUNCTURE: CPT | Performed by: INTERNAL MEDICINE

## 2024-07-08 PROCEDURE — G2211 COMPLEX E/M VISIT ADD ON: HCPCS | Performed by: INTERNAL MEDICINE

## 2024-07-08 PROCEDURE — 82607 VITAMIN B-12: CPT | Performed by: INTERNAL MEDICINE

## 2024-07-08 PROCEDURE — 82746 ASSAY OF FOLIC ACID SERUM: CPT | Performed by: INTERNAL MEDICINE

## 2024-07-08 PROCEDURE — 80053 COMPREHEN METABOLIC PANEL: CPT | Performed by: INTERNAL MEDICINE

## 2024-07-08 PROCEDURE — 99214 OFFICE O/P EST MOD 30 MIN: CPT | Performed by: INTERNAL MEDICINE

## 2024-07-08 PROCEDURE — 85025 COMPLETE CBC W/AUTO DIFF WBC: CPT | Performed by: INTERNAL MEDICINE

## 2024-07-08 PROCEDURE — 84439 ASSAY OF FREE THYROXINE: CPT | Performed by: INTERNAL MEDICINE

## 2024-07-08 PROCEDURE — 84443 ASSAY THYROID STIM HORMONE: CPT | Performed by: INTERNAL MEDICINE

## 2024-07-08 RX ORDER — GABAPENTIN 600 MG/1
600 TABLET, FILM COATED ORAL 4 TIMES DAILY
Start: 2024-07-08

## 2024-07-08 NOTE — PROGRESS NOTES
Chief Complaint   Patient presents with    Follow-up     4 month follow up chronic medical problems       History of Present Illness      The patient presents for a follow-up related to hyperlipidemia. She is following a low fat diet. She reports that she is exercising. She is not taking medication for hyperlipidemia. She denies chest pain, shortness of breath, orthopnea, paroxysmal nocturnal dyspnea, dyspnea on exertion, edema, palpitations or syncope.    The patient presents for a follow-up related to GERD. The patient is on famotidine and pantoprazole for her gastroesophageal reflux. The medication is taken on a regular basis and gives complete relief of the symptoms. She reports no abdominal pain, belching, diarrhea, dysphagia, early satiety, heartburn, hoarseness, nausea, odynophagia, rectal bleeding, vomiting or weight loss. The GERD has no known aggravating factors.    The patient presents for a follow-up related to hypertension. The patient reports that she has had no headaches or blurred vision. She states that she is taking her medication as prescribed. She is not having medication side effects.    The patient presents for a follow-up related to low back pain which has been a chronic problem. She states that she can't remember the exact initial inciting event. The pain is not associated with fever, chills, rashes, dysuria, hematuria or a decreased urine stream. The patient denies a past history of malignancy. The pain does not radiate. There is no numbness. The patient denies loss of bladder control. The patient denies loss of bowel control. The patient reports no aggravating factors.    The patient complains of memory loss over 6 months. The primary type of memory affected is learning and retention. The patient still drives. She states that she does not get lost. There are no symptoms of depression.    Review of Systems    PULMONARY- Denies Wheezing, Sputum Production, Cough, Hemoptysis or Pleuritic Chest  Pain.    CARDIOVASCULAR- Denies Claudication or Irregular Heart Beat.    NEUROLOGIC- Reports: Memory Loss. Denies: Seizures, Confusion or Depression.    Medications      Current Outpatient Medications:     AMITIZA 24 MCG capsule, Take 1 capsule by mouth 2 (Two) Times a Day., Disp: , Rfl:     B Complex Vitamins (VITAMIN B COMPLEX PO), Take 1 tablet by mouth Daily., Disp: , Rfl:     Calcium-Magnesium-Vitamin D (CALCIUM MAGNESIUM PO), Take  by mouth., Disp: , Rfl:     custom compounded cream, Apply  topically to the appropriate area as directed. CUSTOM COMPOUNDED RECTAL SUPPOSITORIES BELLADONNA/MORPHINE SULF 16.2/5MG  INSERT 1 SUPPOSITORY TWICE DAILY AS NEEDED, Disp: , Rfl:     custom compounded cream, Apply  topically to the appropriate area as directed. CUSTOM COMPOUNDED RECTAL SUPPOSITORY HYDROCORTISONE/LIDOCAINE 2.5% / 2% INSERT 1 SUPPOSITORY TWICE DAILY AS NEEDED, Disp: , Rfl:     DHA-EPA-Vitamin E (OMEGA-3 COMPLEX PO), Take 1 capsule by mouth Daily., Disp: , Rfl:     diazePAM (DIASTAT) 2.5 MG gel, Insert 5 mg into the rectum At Night As Needed., Disp: , Rfl:     diclofenac (VOLTAREN) 1 % gel gel, Apply four grams topically to affected areas four times a day as needed for pain., Disp: 100 g, Rfl: 1    Dietary Management Product (VASCULERA PO), Take 1 tablet by mouth 2 (Two) Times a Day., Disp: , Rfl:     Diphenhydramine-APAP, sleep, (TYLENOL PM EXTRA STRENGTH PO), Take 2 tablets by mouth At Night As Needed., Disp: , Rfl:     famotidine (PEPCID) 20 MG tablet, Take 1 tablet by mouth 2 (Two) Times a Day As Needed., Disp: , Rfl:     fluticasone (FLONASE) 50 MCG/ACT nasal spray, 2 sprays into the nostril(s) as directed by provider Daily. (Patient taking differently: 2 sprays into the nostril(s) as directed by provider Daily As Needed.), Disp: 16 g, Rfl: 3    gabapentin (NEURONTIN) 100 MG capsule, Take 1 capsule by mouth 3 (Three) Times a Day., Disp: 90 capsule, Rfl: 2    gabapentin (NEURONTIN) 300 MG capsule, Take 1  capsule by mouth 5 (Five) Times a Day., Disp: , Rfl:     Lidocaine, Anorectal, (LMX 5) 5 % cream cream, Apply  topically 4 (Four) Times a Day As Needed., Disp: , Rfl:     meclizine (ANTIVERT) 12.5 MG tablet, Take 1 tablet by mouth Every 6 (Six) Hours As Needed., Disp: , Rfl:     methocarbamol (ROBAXIN) 500 MG tablet, Take 1 tablet by mouth 3 (Three) Times a Day., Disp: , Rfl:     metroNIDAZOLE (Metrogel) 1 % gel, Apply  topically to the appropriate area as directed Daily., Disp: 60 g, Rfl: 2    montelukast (SINGULAIR) 10 MG tablet, TAKE ONE TABLET BY MOUTH ONCE NIGHTLY, Disp: 90 tablet, Rfl: 1    nitrofurantoin, macrocrystal-monohydrate, (Macrobid) 100 MG capsule, Take 1 capsule by mouth 2 (Two) Times a Day., Disp: 16 capsule, Rfl: 0    pantoprazole (PROTONIX) 40 MG EC tablet, TAKE ONE TABLET BY MOUTH DAILY, Disp: 90 tablet, Rfl: 1    polyethylene glycol (MIRALAX) powder, Take 17 g by mouth Daily As Needed (constipation)., Disp: , Rfl:     Probiotic Product (PROBIOTIC DAILY PO), Take 1 tablet by mouth Daily., Disp: , Rfl:     raloxifene (EVISTA) 60 MG tablet, TAKE 1 TABLET BY MOUTH DAILY, Disp: 90 tablet, Rfl: 1    Simethicone (GAS-X PO), Take  by mouth., Disp: , Rfl:     traMADol (ULTRAM) 50 MG tablet, Take 1-2 tablets by mouth Every 6 (Six) Hours As Needed for Moderate Pain., Disp: 240 tablet, Rfl: 0    triamterene-hydrochlorothiazide (DYAZIDE) 37.5-25 MG per capsule, Take 1 capsule by mouth Daily., Disp: , Rfl:     VITAMIN D, CHOLECALCIFEROL, PO, Take  by mouth Daily., Disp: , Rfl:     Wheat Dextrin (BENEFIBER DRINK MIX PO), Take  by mouth Daily., Disp: , Rfl:      Allergies    Allergies   Allergen Reactions    Motrin [Ibuprofen] Nausea Only       Problem List    Patient Active Problem List   Diagnosis    Rectal pain    S/P hemorrhoidectomy 11/1/16    Urinary retention    Acute urinary retention    Chronic low back pain    Osteopenia of neck of femur    Heart murmur       Medications, Allergies, Problems List and  Past History were reviewed and updated.    Physical Examination    /62 (BP Location: Right arm, Patient Position: Sitting, Cuff Size: Adult)   Pulse 84   Temp 98 °F (36.7 °C) (Infrared)   Resp 16   Wt 56.7 kg (125 lb)   LMP  (LMP Unknown)   BMI 24.21 kg/m²       HEENT: Head- Normocephalic Atraumatic. Facies- Within normal limits. Pinnas- Normal texture and shape bilaterally. Canals- Normal bilaterally. TMs- Normal bilaterally. Nares- Patent bilaterally. Nasal Septum- is normal. There is no tenderness to palpation over the frontal or maxillary sinuses. Lids- Normal bilaterally. Conjunctiva- Clear bilaterally. Sclera- Anicteric bilaterally. Oropharynx- Moist with no lesions. Tonsils- No enlargement, erythema or exudate.    Neck: Thyroid- non enlarged, symmetric and has no nodules. No bruits are detected. ROM- Normal Range of Motion with no rigidity.    Lungs: Auscultation- Clear to auscultation bilaterally. There are no retractions, clubbing or cyanosis. The Expiratory to Inspiratory ratio is equal.    Lymph Nodes: Cervical- no enlarged lymph nodes noted. Clavicular- Deferred. Axillary- Deferred. Inguinal- Deferred.    Cardiovascular: There are no carotid bruits. Heart- Normal Rate with Regular rhythm and no murmurs. There are no gallops. There are no rubs. In the lower extremities there is no edema. The upper extremities do not have edema.    Abdomen: Soft, benign, non-tender with no masses, hernias, organomegaly or scars.    Back: The patient has a normal spinal curvature with no evidence of scoliosis. There are no skin lesions noted on the back. Palpation reveals no tenderness and normal muscle tone. The straight leg raising test is negative. The back has normal flexion, extension, rotation, and lateral bending.    Neurologic Exam:    Cranial Nerves II-XII: Intact    MMSE: 29/30.    Lower Extremity Neuro Exam: Muscle Strength is 5/5 in all major lower extremity muscle groups. Deep Tendon Reflexes are 2+  and equal in the patellar and Achilles distributions bilaterally. Sensation is normal in all distributions.    Gait: Normal.    Impression and Assessment    Hyperlipidemia.    Gastroesophageal Reflux Disease.    Essential Hypertension.    Low Back Pain.    Memory Loss.    Plan    Gastroesophageal Reflux Disease Plan: The condition is stable. No change is needed in the current plan.    Hyperlipidemia Plan: The patient was instructed to exercise daily, eat a low fat diet and continue her medications.    Essential Hypertension Plan: The patient was instructed to continue the current medications.    Low Back Pain Plan: The medications were adjusted as noted. Will increase gabapentin to 600 mg four times daily.    Memory Loss Plan: Further plans will be made after test results are received and reviewed.    Diagnoses and all orders for this visit:    1. Essential hypertension (Primary)  -     CBC & Differential; Future  -     Comprehensive Metabolic Panel; Future  -     CBC & Differential  -     Comprehensive Metabolic Panel    2. Gastroesophageal reflux disease without esophagitis    3. Hyperlipidemia, unspecified hyperlipidemia type  -     Comprehensive Metabolic Panel; Future  -     Lipid Panel; Future  -     Comprehensive Metabolic Panel  -     Lipid Panel    4. Chronic midline low back pain without sciatica  -     gabapentin, once-daily, (GRALISE) 600 MG tablet tablet; Take 1 tablet by mouth 4 (Four) Times a Day.    5. Memory loss  -     CBC & Differential; Future  -     Comprehensive Metabolic Panel; Future  -     Folate; Future  -     Vitamin B12; Future  -     TSH; Future  -     T4, Free; Future  -     CBC & Differential  -     Comprehensive Metabolic Panel  -     Folate  -     Vitamin B12  -     TSH  -     T4, Free      Medications Discontinued During This Encounter   Medication Reason    gabapentin (NEURONTIN) 300 MG capsule     gabapentin (NEURONTIN) 100 MG capsule        Return to Office    The patient was  instructed to return for follow-up in 4 months. The patient was instructed to return sooner if the condition changes, worsens, or does not resolve.

## 2024-07-09 LAB
ALBUMIN SERPL-MCNC: 4.1 G/DL (ref 3.5–5.2)
ALBUMIN/GLOB SERPL: 1.4 G/DL
ALP SERPL-CCNC: 50 U/L (ref 39–117)
ALT SERPL W P-5'-P-CCNC: 9 U/L (ref 1–33)
ANION GAP SERPL CALCULATED.3IONS-SCNC: 13.9 MMOL/L (ref 5–15)
AST SERPL-CCNC: 20 U/L (ref 1–32)
BASOPHILS # BLD AUTO: 0.07 10*3/MM3 (ref 0–0.2)
BASOPHILS NFR BLD AUTO: 1.3 % (ref 0–1.5)
BILIRUB SERPL-MCNC: 0.2 MG/DL (ref 0–1.2)
BUN SERPL-MCNC: 15 MG/DL (ref 8–23)
BUN/CREAT SERPL: 16.3 (ref 7–25)
CALCIUM SPEC-SCNC: 9 MG/DL (ref 8.6–10.5)
CHLORIDE SERPL-SCNC: 95 MMOL/L (ref 98–107)
CHOLEST SERPL-MCNC: 190 MG/DL (ref 0–200)
CO2 SERPL-SCNC: 25.1 MMOL/L (ref 22–29)
CREAT SERPL-MCNC: 0.92 MG/DL (ref 0.57–1)
DEPRECATED RDW RBC AUTO: 42.4 FL (ref 37–54)
EGFRCR SERPLBLD CKD-EPI 2021: 63.9 ML/MIN/1.73
EOSINOPHIL # BLD AUTO: 0.12 10*3/MM3 (ref 0–0.4)
EOSINOPHIL NFR BLD AUTO: 2.3 % (ref 0.3–6.2)
ERYTHROCYTE [DISTWIDTH] IN BLOOD BY AUTOMATED COUNT: 14.4 % (ref 12.3–15.4)
FOLATE SERPL-MCNC: 15 NG/ML (ref 4.78–24.2)
GLOBULIN UR ELPH-MCNC: 2.9 GM/DL
GLUCOSE SERPL-MCNC: 90 MG/DL (ref 65–99)
HCT VFR BLD AUTO: 31.1 % (ref 34–46.6)
HDLC SERPL-MCNC: 87 MG/DL (ref 40–60)
HGB BLD-MCNC: 10.2 G/DL (ref 12–15.9)
IMM GRANULOCYTES # BLD AUTO: 0.01 10*3/MM3 (ref 0–0.05)
IMM GRANULOCYTES NFR BLD AUTO: 0.2 % (ref 0–0.5)
LDLC SERPL CALC-MCNC: 90 MG/DL (ref 0–100)
LDLC/HDLC SERPL: 1.02 {RATIO}
LYMPHOCYTES # BLD AUTO: 2.01 10*3/MM3 (ref 0.7–3.1)
LYMPHOCYTES NFR BLD AUTO: 38.2 % (ref 19.6–45.3)
MCH RBC QN AUTO: 26.8 PG (ref 26.6–33)
MCHC RBC AUTO-ENTMCNC: 32.8 G/DL (ref 31.5–35.7)
MCV RBC AUTO: 81.8 FL (ref 79–97)
MONOCYTES # BLD AUTO: 0.63 10*3/MM3 (ref 0.1–0.9)
MONOCYTES NFR BLD AUTO: 12 % (ref 5–12)
NEUTROPHILS NFR BLD AUTO: 2.42 10*3/MM3 (ref 1.7–7)
NEUTROPHILS NFR BLD AUTO: 46 % (ref 42.7–76)
NRBC BLD AUTO-RTO: 0 /100 WBC (ref 0–0.2)
PLATELET # BLD AUTO: 326 10*3/MM3 (ref 140–450)
PMV BLD AUTO: 9.3 FL (ref 6–12)
POTASSIUM SERPL-SCNC: 4.2 MMOL/L (ref 3.5–5.2)
PROT SERPL-MCNC: 7 G/DL (ref 6–8.5)
RBC # BLD AUTO: 3.8 10*6/MM3 (ref 3.77–5.28)
SODIUM SERPL-SCNC: 134 MMOL/L (ref 136–145)
T4 FREE SERPL-MCNC: 1.2 NG/DL (ref 0.92–1.68)
TRIGL SERPL-MCNC: 72 MG/DL (ref 0–150)
TSH SERPL DL<=0.05 MIU/L-ACNC: 2.43 UIU/ML (ref 0.27–4.2)
VIT B12 BLD-MCNC: 587 PG/ML (ref 211–946)
VLDLC SERPL-MCNC: 13 MG/DL (ref 5–40)
WBC NRBC COR # BLD AUTO: 5.26 10*3/MM3 (ref 3.4–10.8)

## 2024-07-10 DIAGNOSIS — Z13.79 GENETIC TESTING: Primary | ICD-10-CM

## 2024-07-10 NOTE — PROGRESS NOTES
This patient recently took the CARE risk assessment for a mammogram appointment. Based on the patient's responses, NCCN criteria for genetic testing was met.     Navigator follow-up:    The patient would like to proceed with genetic testing.  I will submit an order for approval from her provider and coordinate care.

## 2024-07-23 ENCOUNTER — LAB (OUTPATIENT)
Dept: LAB | Facility: HOSPITAL | Age: 79
End: 2024-07-23
Payer: MEDICARE

## 2024-07-23 ENCOUNTER — HOSPITAL ENCOUNTER (OUTPATIENT)
Dept: MAMMOGRAPHY | Facility: HOSPITAL | Age: 79
Discharge: HOME | End: 2024-07-23
Payer: MEDICARE

## 2024-07-23 DIAGNOSIS — Z12.31 SCREENING MAMMOGRAM FOR BREAST CANCER: ICD-10-CM

## 2024-07-23 DIAGNOSIS — Z13.79 GENETIC TESTING: ICD-10-CM

## 2024-07-23 PROCEDURE — 36415 COLL VENOUS BLD VENIPUNCTURE: CPT

## 2024-07-23 PROCEDURE — 77063 BREAST TOMOSYNTHESIS BI: CPT

## 2024-07-23 PROCEDURE — 77067 SCR MAMMO BI INCL CAD: CPT

## 2024-08-02 DIAGNOSIS — G89.29 CHRONIC LOW BACK PAIN WITHOUT SCIATICA, UNSPECIFIED BACK PAIN LATERALITY: ICD-10-CM

## 2024-08-02 DIAGNOSIS — M54.50 CHRONIC LOW BACK PAIN WITHOUT SCIATICA, UNSPECIFIED BACK PAIN LATERALITY: ICD-10-CM

## 2024-08-05 RX ORDER — TRAMADOL HYDROCHLORIDE 50 MG/1
50-100 TABLET ORAL EVERY 6 HOURS PRN
Qty: 240 TABLET | Refills: 0 | Status: SHIPPED | OUTPATIENT
Start: 2024-08-05

## 2024-08-06 LAB
AMBRY INTERPRETATION REPORT: NORMAL
GENE DIS ANL INTERP-IMP: NORMAL

## 2024-08-14 ENCOUNTER — TRANSCRIBE ORDERS (OUTPATIENT)
Dept: ADMINISTRATIVE | Facility: HOSPITAL | Age: 79
End: 2024-08-14
Payer: MEDICARE

## 2024-08-14 DIAGNOSIS — M54.16 RADICULOPATHY, LUMBAR REGION: Primary | ICD-10-CM

## 2024-08-16 LAB
AMBRY INTERPRETATION REPORT: NORMAL
GENE DIS ANL INTERP-IMP: NORMAL

## 2024-08-20 ENCOUNTER — TELEPHONE (OUTPATIENT)
Dept: INTERNAL MEDICINE | Facility: CLINIC | Age: 79
End: 2024-08-20
Payer: MEDICARE

## 2024-08-20 RX ORDER — DIAZEPAM 5 MG/1
TABLET ORAL
Qty: 2 TABLET | Refills: 0 | Status: SHIPPED | OUTPATIENT
Start: 2024-08-20

## 2024-08-20 NOTE — TELEPHONE ENCOUNTER
Caller: Brandi Duncan    Relationship: Self    Best call back number: [phone]     What medication are you requesting: VALIUM       If a prescription is needed, what is your preferred pharmacy and phone number: [facility] [id number] - [place], [place] - [address] - [phone]  - [phone] FX     Additional notes: PATIENT STATES THAT SHE IS GOING TO BE HAVING A CLOSED MRI AND SHE IS NEEDING TO GET SOME MEDICATION TO HELP HER. SHE SAYS THAT SHE HAS BEEN GIVEN THIS MEDICATION IN THE PAST FOR THIS REASON.

## 2024-08-21 RX ORDER — RALOXIFENE HYDROCHLORIDE 60 MG/1
60 TABLET, FILM COATED ORAL DAILY
Qty: 90 TABLET | Refills: 1 | Status: SHIPPED | OUTPATIENT
Start: 2024-08-21

## 2024-08-28 ENCOUNTER — OFFICE VISIT (OUTPATIENT)
Dept: INTERNAL MEDICINE | Facility: CLINIC | Age: 79
End: 2024-08-28
Payer: MEDICARE

## 2024-08-28 VITALS
RESPIRATION RATE: 18 BRPM | SYSTOLIC BLOOD PRESSURE: 124 MMHG | TEMPERATURE: 97.8 F | BODY MASS INDEX: 25.72 KG/M2 | HEIGHT: 60 IN | HEART RATE: 80 BPM | DIASTOLIC BLOOD PRESSURE: 60 MMHG | WEIGHT: 131 LBS

## 2024-08-28 DIAGNOSIS — R82.90 ABNORMAL URINALYSIS: Primary | ICD-10-CM

## 2024-08-28 DIAGNOSIS — R05.2 SUBACUTE COUGH: ICD-10-CM

## 2024-08-28 LAB
BILIRUB BLD-MCNC: NEGATIVE MG/DL
CLARITY, POC: CLEAR
COLOR UR: ABNORMAL
EXPIRATION DATE: ABNORMAL
GLUCOSE UR STRIP-MCNC: NEGATIVE MG/DL
KETONES UR QL: NEGATIVE
LEUKOCYTE EST, POC: ABNORMAL
Lab: ABNORMAL
NITRITE UR-MCNC: NEGATIVE MG/ML
PH UR: 8 [PH] (ref 5–8)
PROT UR STRIP-MCNC: NEGATIVE MG/DL
RBC # UR STRIP: NEGATIVE /UL
SP GR UR: 1 (ref 1–1.03)
UROBILINOGEN UR QL: NORMAL

## 2024-08-28 PROCEDURE — 1160F RVW MEDS BY RX/DR IN RCRD: CPT | Performed by: NURSE PRACTITIONER

## 2024-08-28 PROCEDURE — 1126F AMNT PAIN NOTED NONE PRSNT: CPT | Performed by: NURSE PRACTITIONER

## 2024-08-28 PROCEDURE — 1159F MED LIST DOCD IN RCRD: CPT | Performed by: NURSE PRACTITIONER

## 2024-08-28 PROCEDURE — 99214 OFFICE O/P EST MOD 30 MIN: CPT | Performed by: NURSE PRACTITIONER

## 2024-08-28 PROCEDURE — 87086 URINE CULTURE/COLONY COUNT: CPT | Performed by: NURSE PRACTITIONER

## 2024-08-28 PROCEDURE — 81003 URINALYSIS AUTO W/O SCOPE: CPT | Performed by: NURSE PRACTITIONER

## 2024-08-28 RX ORDER — CEFDINIR 300 MG/1
300 CAPSULE ORAL 2 TIMES DAILY
Qty: 20 CAPSULE | Refills: 0 | Status: SHIPPED | OUTPATIENT
Start: 2024-08-28

## 2024-08-28 RX ORDER — AZELASTINE 1 MG/ML
2 SPRAY, METERED NASAL DAILY
Qty: 1 EACH | Refills: 12 | Status: SHIPPED | OUTPATIENT
Start: 2024-08-28

## 2024-08-28 NOTE — PROGRESS NOTES
"Chief Complaint  nasal drainage, Cough (X2-3 weeks. ), and Difficulty Urinating (Burning when urinating. )    Subjective          Brandi Duncan presents to [facility]  History of Present Illness  History of Present Illness  The patient comes clinic today with complaints of nasal drainage cough x 2 to 3 weeks that she is having difficulty burning when urinating.  She reports she is having more urinary symptoms and infections over the last year.  She has started wearing it hands and feels this may have some contribution.  Reviewed the last 2 urine cultures of 2024.  Recent urine culture was without bacterial growth.  Previous urine culture noted E. coli.  She has a history of ovarian cancer not a candidate for Premarin cream.  She plans to change the top of the tans she is waiting to see if this may be helpful.  She also has constipation which she has completed pelvic floor therapy for.    The patient has had a 2 to 3-week history of cough.  Her  has had similar symptoms.  She is unsure if it is her allergies.  She takes daily Singulair and Flonase.  She denies any fever chills sweats shortness of breath or chest pain.  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  She reports that she has had congestion and postnasal drainage that is clear.    Objective   Vital Signs:   /60 (BP Location: Right arm, Patient Position: Sitting, Cuff Size: Adult)   Pulse 80   Temp 97.8 °F (36.6 °C) (Infrared)   Resp 18   Ht 153 cm (60.25\")   Wt 59.4 kg (131 lb)   BMI 25.37 kg/m²     Physical Exam  Vitals and nursing note reviewed.   Constitutional:       General: She is not in acute distress.     Appearance: Normal appearance. She is well-developed. She is not ill-appearing.   HENT:      Head: Normocephalic and atraumatic.      Ears:      Comments: B fluid clear behind TMs     Nose: Congestion present.      " Mouth/Throat:      Pharynx: Oropharynx is clear.      Comments: Clear postnasal drainage and cobblestoning no erythema or exudate  Eyes:      General: No scleral icterus.  Neck:      Thyroid: No thyromegaly.   Cardiovascular:      Rate and Rhythm: Normal rate and regular rhythm.   Pulmonary:      Effort: Pulmonary effort is normal.      Breath sounds: Normal breath sounds.   Abdominal:      General: Bowel sounds are normal. There is no distension.      Palpations: Abdomen is soft.      Tenderness: There is abdominal tenderness (Suprapubic region).   Lymphadenopathy:      Cervical: No cervical adenopathy.   Skin:     Capillary Refill: Capillary refill takes 2 to 3 seconds.      Coloration: Skin is not pale.   Neurological:      Mental Status: She is alert and oriented to person, place, and time.   Psychiatric:         Mood and Affect: Mood normal.         Behavior: Behavior normal.        Result Review :                 Assessment and Plan    Diagnoses and all orders for this visit:    1. Abnormal urinalysis (Primary)  -     Urine Culture - , Urine, Random Void; Future  -     POCT urinalysis dipstick, automated  -     Urine Culture - Urine, Urine, Random Void  -     cefdinir (OMNICEF) 300 MG capsule; Take 1 capsule by mouth 2 (Two) Times a Day.  Dispense: 20 capsule; Refill: 0    2. Subacute cough  -     azelastine (ASTELIN) 0.1 % nasal spray; 2 sprays into the nostril(s) as directed by provider Daily. Use in each nostril as directed  Dispense: 1 each; Refill: 12  -     cefdinir (OMNICEF) 300 MG capsule; Take 1 capsule by mouth 2 (Two) Times a Day.  Dispense: 20 capsule; Refill: 0      Assessment & Plan  I reviewed with patient that her symptoms is likely multifactorial.  She is not a candidate for Premarin.  She should still continue to work on pelvic floor therapy and prevention of constipation.  Will do a urine culture as her urine dip had 500 leukocytes.  If placed on Omnicef given she has upper respiratory  symptoms as well will follow-up culture results when it is made available and if her symptoms or not improving she will let me know.  In regards to the allergies I have added Astelin.  If her symptoms are not improving she will let me know.  Follow Up   Return if symptoms worsen or fail to improve.  Patient was given instructions and counseling regarding her condition or for health maintenance advice. Please see specific information pulled into the AVS if appropriate.     RTC/call  If symptoms worsen  Meds MOA and SE's reviewed and pt v/u    08/28/24   13:22 EDT

## 2024-08-29 ENCOUNTER — HOSPITAL ENCOUNTER (OUTPATIENT)
Dept: MRI IMAGING | Facility: HOSPITAL | Age: 79
Discharge: HOME | End: 2024-08-29
Admitting: PHYSICIAN ASSISTANT
Payer: MEDICARE

## 2024-08-29 DIAGNOSIS — M54.16 RADICULOPATHY, LUMBAR REGION: ICD-10-CM

## 2024-08-29 LAB — BACTERIA SPEC AEROBE CULT: NORMAL

## 2024-08-29 PROCEDURE — 72148 MRI LUMBAR SPINE W/O DYE: CPT

## 2024-09-05 DIAGNOSIS — G89.29 CHRONIC LOW BACK PAIN WITHOUT SCIATICA, UNSPECIFIED BACK PAIN LATERALITY: ICD-10-CM

## 2024-09-05 DIAGNOSIS — M54.50 CHRONIC LOW BACK PAIN WITHOUT SCIATICA, UNSPECIFIED BACK PAIN LATERALITY: ICD-10-CM

## 2024-09-05 RX ORDER — TRAMADOL HYDROCHLORIDE 50 MG/1
50-100 TABLET ORAL EVERY 6 HOURS PRN
Qty: 240 TABLET | Refills: 0 | Status: SHIPPED | OUTPATIENT
Start: 2024-09-05

## 2024-10-02 ENCOUNTER — TELEPHONE (OUTPATIENT)
Dept: INTERNAL MEDICINE | Facility: CLINIC | Age: 79
End: 2024-10-02
Payer: MEDICARE

## 2024-10-02 DIAGNOSIS — U07.1 COVID-19 VIRUS INFECTION: Primary | ICD-10-CM

## 2024-10-02 NOTE — TELEPHONE ENCOUNTER
Caller: Brandi Duncan    Relationship to patient: Self    Best call back number: [phone]     Date of positive COVID19 test: 10/02/24    Date of possible COVID19 exposure: 09/28/24    COVID19 symptoms: COUGH, DRAINAGE, BAD SINUS PRESSURE , WEAK AND FATIGUE     Date of initial quarantine: 10/02/24    Additional information or concerns: PLEASE CALL PATIENT TO DISCUSS HER OPTIONS     What is the patients preferred pharmacy: [id number] - [address] - [phone]  - [phone]  [phone]

## 2024-10-08 DIAGNOSIS — G89.29 CHRONIC LOW BACK PAIN WITHOUT SCIATICA, UNSPECIFIED BACK PAIN LATERALITY: ICD-10-CM

## 2024-10-08 DIAGNOSIS — M54.50 CHRONIC LOW BACK PAIN WITHOUT SCIATICA, UNSPECIFIED BACK PAIN LATERALITY: ICD-10-CM

## 2024-10-08 RX ORDER — TRAMADOL HYDROCHLORIDE 50 MG/1
50-100 TABLET ORAL EVERY 6 HOURS PRN
Qty: 240 TABLET | Refills: 0 | Status: SHIPPED | OUTPATIENT
Start: 2024-10-08

## 2024-10-10 ENCOUNTER — TELEPHONE (OUTPATIENT)
Dept: INTERNAL MEDICINE | Facility: CLINIC | Age: 79
End: 2024-10-10
Payer: MEDICARE

## 2024-10-10 NOTE — TELEPHONE ENCOUNTER
"Spoke with patient, states she is not having any abdominal pain or fever.  Explained that Dr Myers said \"Otherwise Miralax over the counter 1-3 times daily titrated to achieve a soft but formed BM daily.    If symptoms worsen, don't improve, develops a fever needs to be seen.\"   States that she took a bottle of Mag Citrate about 2 hours ago but has not had any results yet.  States something is not right.  States her stool has been getting smaller and smaller.  States for years she has taken Amitiza 24 mg twice dialy, Miralax and Benafiber daily, and that she takes a fleets enema every other day.  States she has taken a fleets every day for the last 3-4 days with no results at all. States she sees Dr Yarelis Olivares as colorectal surgery but has not recently.    Discussed with Dr Myers and gave info as above.  States for patient to repeat the Mag Citrate in the morning if no results prior and to call Dr Olivares's office to let them know and get an appointment.  States he feels she needs a colonoscopy.     Spoke with patient again and informed that Dr Myers said for her to repeat the Mag Citrate in the morning if no results prior and to call Dr Olivares's office to let them know and get an appointment.  Informed that Dr Myers feels she needs a colonoscopy.  Verbalized understanding, agreement and appreciation.  States she will do that.   "

## 2024-10-10 NOTE — TELEPHONE ENCOUNTER
ER if severe abdominal pain or fever.  Otherwise Miralax over the counter 1-3 times daily titrated to achieve a soft but formed BM daily.     If symptoms worsen, don't improve, develops a fever needs to be seen.     Abram Myers MD  15:53 EDT  10/10/24

## 2024-10-15 RX ORDER — PANTOPRAZOLE SODIUM 40 MG/1
40 TABLET, DELAYED RELEASE ORAL DAILY
Qty: 90 TABLET | Refills: 1 | Status: SHIPPED | OUTPATIENT
Start: 2024-10-15

## 2024-11-11 ENCOUNTER — OFFICE VISIT (OUTPATIENT)
Dept: INTERNAL MEDICINE | Facility: CLINIC | Age: 79
End: 2024-11-11
Payer: MEDICARE

## 2024-11-11 VITALS
RESPIRATION RATE: 18 BRPM | HEIGHT: 60 IN | DIASTOLIC BLOOD PRESSURE: 82 MMHG | TEMPERATURE: 98.3 F | OXYGEN SATURATION: 97 % | HEART RATE: 84 BPM | WEIGHT: 130 LBS | BODY MASS INDEX: 25.52 KG/M2 | SYSTOLIC BLOOD PRESSURE: 128 MMHG

## 2024-11-11 DIAGNOSIS — Z23 IMMUNIZATION DUE: ICD-10-CM

## 2024-11-11 DIAGNOSIS — K21.9 GASTROESOPHAGEAL REFLUX DISEASE WITHOUT ESOPHAGITIS: ICD-10-CM

## 2024-11-11 DIAGNOSIS — R82.998 LEUKOCYTES IN URINE: ICD-10-CM

## 2024-11-11 DIAGNOSIS — I10 ESSENTIAL HYPERTENSION: ICD-10-CM

## 2024-11-11 DIAGNOSIS — G89.29 CHRONIC LOW BACK PAIN WITHOUT SCIATICA, UNSPECIFIED BACK PAIN LATERALITY: ICD-10-CM

## 2024-11-11 DIAGNOSIS — E78.5 HYPERLIPIDEMIA, UNSPECIFIED HYPERLIPIDEMIA TYPE: ICD-10-CM

## 2024-11-11 DIAGNOSIS — Z00.00 MEDICARE ANNUAL WELLNESS VISIT, SUBSEQUENT: Primary | ICD-10-CM

## 2024-11-11 DIAGNOSIS — M54.50 CHRONIC LOW BACK PAIN WITHOUT SCIATICA, UNSPECIFIED BACK PAIN LATERALITY: ICD-10-CM

## 2024-11-11 LAB
BASOPHILS # BLD AUTO: 0.06 10*3/MM3 (ref 0–0.2)
BASOPHILS NFR BLD AUTO: 1.2 % (ref 0–1.5)
BILIRUB BLD-MCNC: NEGATIVE MG/DL
CLARITY, POC: CLEAR
COLOR UR: YELLOW
DEPRECATED RDW RBC AUTO: 44.9 FL (ref 37–54)
EOSINOPHIL # BLD AUTO: 0.11 10*3/MM3 (ref 0–0.4)
EOSINOPHIL NFR BLD AUTO: 2.2 % (ref 0.3–6.2)
ERYTHROCYTE [DISTWIDTH] IN BLOOD BY AUTOMATED COUNT: 15.2 % (ref 12.3–15.4)
EXPIRATION DATE: ABNORMAL
GLUCOSE UR STRIP-MCNC: NEGATIVE MG/DL
HCT VFR BLD AUTO: 30 % (ref 34–46.6)
HGB BLD-MCNC: 10 G/DL (ref 12–15.9)
IMM GRANULOCYTES # BLD AUTO: 0 10*3/MM3 (ref 0–0.05)
IMM GRANULOCYTES NFR BLD AUTO: 0 % (ref 0–0.5)
KETONES UR QL: NEGATIVE
LEUKOCYTE EST, POC: ABNORMAL
LYMPHOCYTES # BLD AUTO: 1.82 10*3/MM3 (ref 0.7–3.1)
LYMPHOCYTES NFR BLD AUTO: 37.1 % (ref 19.6–45.3)
Lab: ABNORMAL
MCH RBC QN AUTO: 27.4 PG (ref 26.6–33)
MCHC RBC AUTO-ENTMCNC: 33.3 G/DL (ref 31.5–35.7)
MCV RBC AUTO: 82.2 FL (ref 79–97)
MONOCYTES # BLD AUTO: 0.63 10*3/MM3 (ref 0.1–0.9)
MONOCYTES NFR BLD AUTO: 12.8 % (ref 5–12)
NEUTROPHILS NFR BLD AUTO: 2.29 10*3/MM3 (ref 1.7–7)
NEUTROPHILS NFR BLD AUTO: 46.7 % (ref 42.7–76)
NITRITE UR-MCNC: NEGATIVE MG/ML
NRBC BLD AUTO-RTO: 0 /100 WBC (ref 0–0.2)
PH UR: 7 [PH] (ref 5–8)
PLATELET # BLD AUTO: 342 10*3/MM3 (ref 140–450)
PMV BLD AUTO: 9.3 FL (ref 6–12)
PROT UR STRIP-MCNC: NEGATIVE MG/DL
RBC # BLD AUTO: 3.65 10*6/MM3 (ref 3.77–5.28)
RBC # UR STRIP: NEGATIVE /UL
SP GR UR: 1.01 (ref 1–1.03)
UROBILINOGEN UR QL: NORMAL
WBC NRBC COR # BLD AUTO: 4.91 10*3/MM3 (ref 3.4–10.8)

## 2024-11-11 PROCEDURE — 90662 IIV NO PRSV INCREASED AG IM: CPT | Performed by: INTERNAL MEDICINE

## 2024-11-11 PROCEDURE — 84443 ASSAY THYROID STIM HORMONE: CPT | Performed by: INTERNAL MEDICINE

## 2024-11-11 PROCEDURE — 1126F AMNT PAIN NOTED NONE PRSNT: CPT | Performed by: INTERNAL MEDICINE

## 2024-11-11 PROCEDURE — 87086 URINE CULTURE/COLONY COUNT: CPT | Performed by: INTERNAL MEDICINE

## 2024-11-11 PROCEDURE — G0439 PPPS, SUBSEQ VISIT: HCPCS | Performed by: INTERNAL MEDICINE

## 2024-11-11 PROCEDURE — G0008 ADMIN INFLUENZA VIRUS VAC: HCPCS | Performed by: INTERNAL MEDICINE

## 2024-11-11 PROCEDURE — 81003 URINALYSIS AUTO W/O SCOPE: CPT | Performed by: INTERNAL MEDICINE

## 2024-11-11 PROCEDURE — 80061 LIPID PANEL: CPT | Performed by: INTERNAL MEDICINE

## 2024-11-11 PROCEDURE — 1160F RVW MEDS BY RX/DR IN RCRD: CPT | Performed by: INTERNAL MEDICINE

## 2024-11-11 PROCEDURE — 1159F MED LIST DOCD IN RCRD: CPT | Performed by: INTERNAL MEDICINE

## 2024-11-11 PROCEDURE — 80053 COMPREHEN METABOLIC PANEL: CPT | Performed by: INTERNAL MEDICINE

## 2024-11-11 PROCEDURE — 99214 OFFICE O/P EST MOD 30 MIN: CPT | Performed by: INTERNAL MEDICINE

## 2024-11-11 PROCEDURE — 85025 COMPLETE CBC W/AUTO DIFF WBC: CPT | Performed by: INTERNAL MEDICINE

## 2024-11-11 NOTE — PROGRESS NOTES
Subjective   The ABCs of the Annual Wellness Visit  Medicare Wellness Visit      Brandi Duncan is a 78 y.o. patient who presents for a Medicare Wellness Visit.    The following portions of the patient's history were reviewed and   updated as appropriate: allergies, current medications, past family history, past medical history, past social history, past surgical history, and problem list.    Compared to one year ago, the patient's physical   health is the same.  Compared to one year ago, the patient's mental   health is the same.    Recent Hospitalizations:  She was not admitted to the hospital during the last year.     Current Medical Providers:  Patient Care Team:  Abram Myers MD as PCP - General (Internal Medicine)  Yarelis Eddy MD as Consulting Physician (Colon and Rectal Surgery)  Yarelis Eddy MD as Consulting Physician (Colon and Rectal Surgery)  Vamsi Fleming MD as Consulting Physician (Neurology)  Lei Fontaine MD as Consulting Physician (Hand Surgery)  Brandi Aly MD as Consulting Physician (Dermatology)  Derick Cho (Urology)  Bautista Crystal MD as Consulting Physician (Otolaryngology)  Davy Singh MD as Consulting Physician (Cardiology)  Keo Tafoya MD as Surgeon (Orthopedic Surgery)    Outpatient Medications Prior to Visit   Medication Sig Dispense Refill    AMITIZA 24 MCG capsule Take 1 capsule by mouth 2 (Two) Times a Day.      azelastine (ASTELIN) 0.1 % nasal spray 2 sprays into the nostril(s) as directed by provider Daily. Use in each nostril as directed 1 each 12    B Complex Vitamins (VITAMIN B COMPLEX PO) Take 1 tablet by mouth Daily.      Calcium-Magnesium-Vitamin D (CALCIUM MAGNESIUM PO) Take  by mouth.      custom compounded cream Apply  topically to the appropriate area as directed. CUSTOM COMPOUNDED RECTAL SUPPOSITORIES  BELLADONNA/MORPHINE SULF 16.2/5MG   INSERT 1 SUPPOSITORY TWICE DAILY AS NEEDED      custom compounded cream  Apply  topically to the appropriate area as directed. CUSTOM COMPOUNDED RECTAL SUPPOSITORY  HYDROCORTISONE/LIDOCAINE 2.5% / 2%  INSERT 1 SUPPOSITORY TWICE DAILY AS NEEDED      DHA-EPA-Vitamin E (OMEGA-3 COMPLEX PO) Take 1 capsule by mouth Daily.      diazePAM (DIASTAT) 2.5 MG gel Insert 5 mg into the rectum At Night As Needed.      diclofenac (VOLTAREN) 1 % gel gel Apply four grams topically to affected areas four times a day as needed for pain. 100 g 1    Dietary Management Product (VASCULERA PO) Take 1 tablet by mouth 2 (Two) Times a Day.      Diphenhydramine-APAP, sleep, (TYLENOL PM EXTRA STRENGTH PO) Take 2 tablets by mouth At Night As Needed.      famotidine (PEPCID) 20 MG tablet Take 1 tablet by mouth 2 (Two) Times a Day As Needed.      fluticasone (FLONASE) 50 MCG/ACT nasal spray 2 sprays into the nostril(s) as directed by provider Daily. (Patient taking differently: Administer 2 sprays into the nostril(s) as directed by provider Daily As Needed.) 16 g 3    gabapentin, once-daily, (GRALISE) 600 MG tablet tablet Take 1 tablet by mouth 4 (Four) Times a Day.      Lidocaine, Anorectal, (LMX 5) 5 % cream cream Apply  topically 4 (Four) Times a Day As Needed.      meclizine (ANTIVERT) 12.5 MG tablet Take 1 tablet by mouth Every 6 (Six) Hours As Needed.      methocarbamol (ROBAXIN) 500 MG tablet Take 1 tablet by mouth 3 (Three) Times a Day.      montelukast (SINGULAIR) 10 MG tablet TAKE ONE TABLET BY MOUTH ONCE NIGHTLY 90 tablet 1    pantoprazole (PROTONIX) 40 MG EC tablet TAKE 1 TABLET BY MOUTH DAILY 90 tablet 1    polyethylene glycol (MIRALAX) powder Take 17 g by mouth Daily As Needed (constipation).      Probiotic Product (PROBIOTIC DAILY PO) Take 1 tablet by mouth Daily.      raloxifene (EVISTA) 60 MG tablet TAKE 1 TABLET BY MOUTH DAILY 90 tablet 1    Simethicone (GAS-X PO) Take  by mouth.      traMADol (ULTRAM) 50 MG tablet Take 1-2 tablets by mouth Every 6 (Six) Hours As Needed for Moderate Pain. 240 tablet 0  "   triamterene-hydrochlorothiazide (DYAZIDE) 37.5-25 MG per capsule Take 1 capsule by mouth Daily.      VITAMIN D, CHOLECALCIFEROL, PO Take  by mouth Daily.      Wheat Dextrin (BENEFIBER DRINK MIX PO) Take  by mouth Daily.      cefdinir (OMNICEF) 300 MG capsule Take 1 capsule by mouth 2 (Two) Times a Day. (Patient not taking: Reported on 11/11/2024) 20 capsule 0    diazePAM (Valium) 5 MG tablet Take 1 tab 1 hour before procedure.  Take an additional tab 15 minutes before if needed. 2 tablet 0     No facility-administered medications prior to visit.     Opioid medication/s are on active medication list.  and I have evaluated her active treatment plan and pain score trends (see table).  Vitals:    11/11/24 1226   PainSc: 0-No pain     I have reviewed the chart for potential of high risk medication and harmful drug interactions in the elderly.        Aspirin is not on active medication list.  Aspirin use is not indicated based on review of current medical condition/s. Risk of harm outweighs potential benefits.  .    Patient Active Problem List   Diagnosis    Rectal pain    S/P hemorrhoidectomy 11/1/16    Urinary retention    Acute urinary retention    Chronic low back pain    Osteopenia of neck of femur    Heart murmur     Advance Care Planning Advance Directive is on file.  ACP discussion was held with the patient during this visit. Patient has an advance directive in EMR which is still valid.             Objective   Vitals:    11/11/24 1226   BP: 128/82   Pulse: 84   Resp: 18   Temp: 98.3 °F (36.8 °C)   SpO2: 97%   Weight: 59 kg (130 lb)   Height: 152.4 cm (60\")   PainSc: 0-No pain       Estimated body mass index is 25.39 kg/m² as calculated from the following:    Height as of this encounter: 152.4 cm (60\").    Weight as of this encounter: 59 kg (130 lb).       Finger Rub Hearing{Test (right ear):passed  Finger Rub Hearing{Test (left ear):passed       Does the patient have evidence of cognitive impairment? No       "                                                                                         Health  Risk Assessment    Smoking Status:  Social History     Tobacco Use   Smoking Status Never    Passive exposure: Yes   Smokeless Tobacco Never   Tobacco Comments    sporatic cigar     Alcohol Consumption:  Social History     Substance and Sexual Activity   Alcohol Use Yes    Alcohol/week: 7.0 standard drinks of alcohol    Types: 7 Glasses of wine per week       Fall Risk Screen  STEADI Fall Risk Assessment was completed, and patient is at LOW risk for falls.Assessment completed on:2024    Depression Screenin/11/2024    12:26 PM   PHQ-2/PHQ-9 Depression Screening   Little interest or pleasure in doing things Not at all   Feeling down, depressed, or hopeless Not at all   How difficult have these problems made it for you to do your work, take care of things at home, or get along with other people? Not difficult at all     Health Habits and Functional and Cognitive Screenin/4/2024    10:28 AM   Functional & Cognitive Status   Do you have difficulty preparing food and eating? No    Do you have difficulty bathing yourself, getting dressed or grooming yourself? No    Do you have difficulty using the toilet? Yes    Do you have difficulty moving around from place to place? No    Do you have trouble with steps or getting out of a bed or a chair? No    Current Diet Well Balanced Diet    Dental Exam Up to date    Eye Exam Up to date    Exercise (times per week) 0 times per week    Current Exercises Include No Regular Exercise    Do you need help using the phone?  No    Are you deaf or do you have serious difficulty hearing?  Yes    Do you need help to go to places out of walking distance? No    Do you need help shopping? No    Do you need help preparing meals?  No    Do you need help with housework?  Yes    Do you need help with laundry? No    Do you need help taking your medications? No    Do you need help  managing money? No    Do you ever drive or ride in a car without wearing a seat belt? No    Have you felt unusual stress, anger or loneliness in the last month? No    Who do you live with? Spouse    If you need help, do you have trouble finding someone available to you? No    Have you been bothered in the last four weeks by sexual problems? No    Do you have difficulty concentrating, remembering or making decisions? No        Patient-reported           Age-appropriate Screening Schedule:  Refer to the list below for future screening recommendations based on patient's age, sex and/or medical conditions. Orders for these recommended tests are listed in the plan section. The patient has been provided with a written plan.    Health Maintenance List  Health Maintenance   Topic Date Due    TDAP/TD VACCINES (1 - Tdap) Never done    RSV Vaccine - Adults (1 - 1-dose 75+ series) Never done    INFLUENZA VACCINE  08/01/2024    ANNUAL WELLNESS VISIT  09/07/2024    COVID-19 Vaccine (9 - 2024-25 season) 09/30/2025 (Originally 9/1/2024)    BMI FOLLOWUP  03/04/2025    LIPID PANEL  07/08/2025    DXA SCAN  07/18/2025    MAMMOGRAM  07/27/2026    COLORECTAL CANCER SCREENING  08/25/2028    HEPATITIS C SCREENING  Completed    Pneumococcal Vaccine 65+  Completed    ZOSTER VACCINE  Completed                                                                                                                                                CMS Preventative Services Quick Reference  Risk Factors Identified During Encounter  Immunizations Discussed/Encouraged: Tdap, Influenza, and COVID19    The above risks/problems have been discussed with the patient.  Pertinent information has been shared with the patient in the After Visit Summary.  An After Visit Summary and PPPS were made available to the patient.    Diagnoses and all orders for this visit:    1. Medicare annual wellness visit, subsequent (Primary)    2. Essential hypertension  -     CBC &  Differential; Future  -     Comprehensive Metabolic Panel; Future  -     TSH; Future  -     POC Urinalysis Dipstick, Automated; Future    3. Gastroesophageal reflux disease without esophagitis    4. Hyperlipidemia, unspecified hyperlipidemia type  -     Comprehensive Metabolic Panel; Future  -     Lipid Panel; Future    5. Chronic low back pain without sciatica, unspecified back pain laterality  Assessment & Plan:             6. Immunization due  -     Fluzone High-Dose 65+yrs           Follow Up:   No follow-ups on file.

## 2024-11-11 NOTE — PROGRESS NOTES
Chief Complaint   Patient presents with    Medicare Wellness-subsequent       History of Present Illness      The patient presents for a follow-up related to hypertension. The patient reports that she has had no headaches, chest pain, dyspnea, edema, syncope, blurred vision or palpitations. She states that she is taking her medication as prescribed. She is not having medication side effects.    The patient presents for a follow-up related to GERD. The patient is on famotidine and pantoprazole for her gastroesophageal reflux. The medication is taken on a regular basis and gives complete relief of the symptoms. She reports no abdominal pain, belching, diarrhea, dysphagia, early satiety, heartburn, hoarseness, nausea, odynophagia, rectal bleeding, vomiting or weight loss. The GERD has no known aggravating factors.    The patient presents for a follow-up related to hyperlipidemia. She is following a low fat diet. She reports that she is exercising. She is not taking medication for hyperlipidemia. She denies orthopnea, paroxysmal nocturnal dyspnea or dyspnea on exertion.    The patient presents for follow-up of low back pain. She reports that the pain is stable. The current treatment consists of no specific therapies.  She does not have associated fever, chills, weight loss, rashes, dysuria, hematuria, or a decreased urine stream. The pain does not radiate. There is no numbness. The patient denies loss of bladder control. The patient denies loss of bowel control. The patient has attempted hot packs. The heat did give pain relief. The patient reports no aggravating factors.    Medications      Current Outpatient Medications:     AMITIZA 24 MCG capsule, Take 1 capsule by mouth 2 (Two) Times a Day., Disp: , Rfl:     azelastine (ASTELIN) 0.1 % nasal spray, 2 sprays into the nostril(s) as directed by provider Daily. Use in each nostril as directed, Disp: 1 each, Rfl: 12    B Complex Vitamins (VITAMIN B COMPLEX PO), Take 1  tablet by mouth Daily., Disp: , Rfl:     Calcium-Magnesium-Vitamin D (CALCIUM MAGNESIUM PO), Take  by mouth., Disp: , Rfl:     custom compounded cream, Apply  topically to the appropriate area as directed. CUSTOM COMPOUNDED RECTAL SUPPOSITORIES BELLADONNA/MORPHINE SULF 16.2/5MG  INSERT 1 SUPPOSITORY TWICE DAILY AS NEEDED, Disp: , Rfl:     custom compounded cream, Apply  topically to the appropriate area as directed. CUSTOM COMPOUNDED RECTAL SUPPOSITORY HYDROCORTISONE/LIDOCAINE 2.5% / 2% INSERT 1 SUPPOSITORY TWICE DAILY AS NEEDED, Disp: , Rfl:     DHA-EPA-Vitamin E (OMEGA-3 COMPLEX PO), Take 1 capsule by mouth Daily., Disp: , Rfl:     diazePAM (DIASTAT) 2.5 MG gel, Insert 5 mg into the rectum At Night As Needed., Disp: , Rfl:     diclofenac (VOLTAREN) 1 % gel gel, Apply four grams topically to affected areas four times a day as needed for pain., Disp: 100 g, Rfl: 1    Dietary Management Product (VASCULERA PO), Take 1 tablet by mouth 2 (Two) Times a Day., Disp: , Rfl:     Diphenhydramine-APAP, sleep, (TYLENOL PM EXTRA STRENGTH PO), Take 2 tablets by mouth At Night As Needed., Disp: , Rfl:     famotidine (PEPCID) 20 MG tablet, Take 1 tablet by mouth 2 (Two) Times a Day As Needed., Disp: , Rfl:     fluticasone (FLONASE) 50 MCG/ACT nasal spray, 2 sprays into the nostril(s) as directed by provider Daily. (Patient taking differently: Administer 2 sprays into the nostril(s) as directed by provider Daily As Needed.), Disp: 16 g, Rfl: 3    gabapentin, once-daily, (GRALISE) 600 MG tablet tablet, Take 1 tablet by mouth 4 (Four) Times a Day., Disp: , Rfl:     Lidocaine, Anorectal, (LMX 5) 5 % cream cream, Apply  topically 4 (Four) Times a Day As Needed., Disp: , Rfl:     meclizine (ANTIVERT) 12.5 MG tablet, Take 1 tablet by mouth Every 6 (Six) Hours As Needed., Disp: , Rfl:     methocarbamol (ROBAXIN) 500 MG tablet, Take 1 tablet by mouth 3 (Three) Times a Day., Disp: , Rfl:     montelukast (SINGULAIR) 10 MG tablet, TAKE ONE  "TABLET BY MOUTH ONCE NIGHTLY, Disp: 90 tablet, Rfl: 1    pantoprazole (PROTONIX) 40 MG EC tablet, TAKE 1 TABLET BY MOUTH DAILY, Disp: 90 tablet, Rfl: 1    polyethylene glycol (MIRALAX) powder, Take 17 g by mouth Daily As Needed (constipation)., Disp: , Rfl:     Probiotic Product (PROBIOTIC DAILY PO), Take 1 tablet by mouth Daily., Disp: , Rfl:     raloxifene (EVISTA) 60 MG tablet, TAKE 1 TABLET BY MOUTH DAILY, Disp: 90 tablet, Rfl: 1    Simethicone (GAS-X PO), Take  by mouth., Disp: , Rfl:     traMADol (ULTRAM) 50 MG tablet, Take 1-2 tablets by mouth Every 6 (Six) Hours As Needed for Moderate Pain., Disp: 240 tablet, Rfl: 0    triamterene-hydrochlorothiazide (DYAZIDE) 37.5-25 MG per capsule, Take 1 capsule by mouth Daily., Disp: , Rfl:     VITAMIN D, CHOLECALCIFEROL, PO, Take  by mouth Daily., Disp: , Rfl:     Wheat Dextrin (BENEFIBER DRINK MIX PO), Take  by mouth Daily., Disp: , Rfl:      Allergies    Allergies   Allergen Reactions    Motrin [Ibuprofen] Nausea Only       Problem List    Patient Active Problem List   Diagnosis    Rectal pain    S/P hemorrhoidectomy 11/1/16    Urinary retention    Acute urinary retention    Chronic low back pain    Osteopenia of neck of femur    Heart murmur       Medications, Allergies, Problems List and Past History were reviewed and updated.    Physical Examination    /82   Pulse 84   Temp 98.3 °F (36.8 °C)   Resp 18   Ht 152.4 cm (60\")   Wt 59 kg (130 lb)   LMP  (LMP Unknown)   SpO2 97%   BMI 25.39 kg/m²       HEENT: Head- Normocephalic Atraumatic. Facies- Within normal limits. Pinnas- Normal texture and shape bilaterally. Canals- Normal bilaterally. TMs- Normal bilaterally. Nares- Patent bilaterally. Nasal Septum- is normal. There is no tenderness to palpation over the frontal or maxillary sinuses. Lids- Normal bilaterally. Conjunctiva- Clear bilaterally. Sclera- Anicteric bilaterally. Oropharynx- Moist with no lesions. Tonsils- No enlargement, erythema or " exudate.    Neck: Thyroid- non enlarged, symmetric and has no nodules. No bruits are detected. ROM- Normal Range of Motion with no rigidity.    Lungs: Auscultation- Clear to auscultation bilaterally. There are no retractions, clubbing or cyanosis. The Expiratory to Inspiratory ratio is equal.    Lymph Nodes: Cervical- no enlarged lymph nodes noted. Clavicular- no enlarged supraclavicular lymph nodes noted. Axillary- no enlarged axillary lymph nodes noted. Inguinal- no enlarged inguinal lymph nodes noted.    Cardiovascular: There are no carotid bruits. Heart- Normal Rate with Regular rhythm and no murmurs. There are no gallops. There are no rubs. In the lower extremities there is no edema. The upper extremities do not have edema.      Abdomen: Soft, benign, non-tender with no masses, hernias, organomegaly or scars.    Breast: Normal contours bilaterally with no masses, discharge, skin changes or lumps. There are no scars noted.    Back: The patient has a normal spinal curvature with no evidence of scoliosis. There are no skin lesions noted on the back. Palpation reveals no tenderness and normal muscle tone. The straight leg raising test is negative. The back has normal flexion, extension, rotation, and lateral bending.    Lower Extremity Neuro Exam: Muscle Strength is 5/5 in all major lower extremity muscle groups. Deep Tendon Reflexes are 2+ and equal in the patellar and Achilles distributions bilaterally. Sensation is normal in all distributions.    Impression and Assessment    Encounter for Immunization Administration.    Essential Hypertension.    Gastroesophageal Reflux Disease.    Hyperlipidemia.    Low Back Pain.    Plan    Gastroesophageal Reflux Disease Plan: The patient was instructed to continue the current medications.    Essential Hypertension Plan: The patient was instructed to continue the current medications.    Hyperlipidemia Plan: The patient was instructed to exercise daily and eat a low fat  diet.    Low Back Pain Plan: The patient was instructed to continue the current medications.    Counseled regarding immunizations and applicable VIS given.    Immunizations Ordered and Administered: Fluzone High Dose 65+ years.    Diagnoses and all orders for this visit:    1. Medicare annual wellness visit, subsequent (Primary)    2. Essential hypertension  -     CBC & Differential; Future  -     Comprehensive Metabolic Panel; Future  -     TSH; Future  -     POC Urinalysis Dipstick, Automated; Future  -     CBC & Differential  -     Comprehensive Metabolic Panel  -     TSH    3. Gastroesophageal reflux disease without esophagitis    4. Hyperlipidemia, unspecified hyperlipidemia type  -     Comprehensive Metabolic Panel; Future  -     Lipid Panel; Future  -     Comprehensive Metabolic Panel  -     Lipid Panel    5. Chronic low back pain without sciatica, unspecified back pain laterality    6. Immunization due  -     Fluzone High-Dose 65+yrs            Return to Office    The patient was instructed to return for follow-up in 1 month. The patient was instructed to return sooner if the condition changes, worsens, or does not resolve.

## 2024-11-11 NOTE — PATIENT INSTRUCTIONS
Medicare Wellness  Personal Prevention Plan of Service     Date of Office Visit:    Encounter Provider:  Abram Myers MD  Place of Service:  [facility]  Patient Name: Brandi Duncan  :  1945    As part of the Medicare Wellness portion of your visit today, we are providing you with this personalized preventive plan of services (PPPS). This plan is based upon recommendations of the [place] Preventive Services Task Force (USPSTF) and the Advisory Committee on Immunization Practices (ACIP).    This lists the preventive care services that should be considered, and provides dates of when you are due. Items listed as completed are up-to-date and do not require any further intervention.    Health Maintenance   Topic Date Due    TDAP/TD VACCINES (1 - Tdap) Never done    RSV Vaccine - Adults (1 - 1-dose 75+ series) Never done    COVID-19 Vaccine (2024- season) 2025 (Originally 2024)    BMI FOLLOWUP  2025    LIPID PANEL  2025    DXA SCAN  2025    ANNUAL WELLNESS VISIT  2025    MAMMOGRAM  2026    COLORECTAL CANCER SCREENING  2028    HEPATITIS C SCREENING  Completed    INFLUENZA VACCINE  Completed    Pneumococcal Vaccine 65+  Completed    ZOSTER VACCINE  Completed       Orders Placed This Encounter   Procedures    Fluzone High-Dose 65+yrs    Comprehensive Metabolic Panel     Standing Status:   Future     Number of Occurrences:   1     Standing Expiration Date:   2025     Order Specific Question:   Release to patient     Answer:   Routine Release [[id number]]    Lipid Panel     Standing Status:   Future     Number of Occurrences:   1     Standing Expiration Date:   2025     Order Specific Question:   Release to patient     Answer:   Routine Release [[id number]]    TSH     Standing Status:   Future     Number of Occurrences:   1     Standing Expiration Date:   2025     Order Specific  Question:   Release to patient     Answer:   Routine Release [[id number]]    CBC Auto Differential     Order Specific Question:   Release to patient     Answer:   Routine Release [[id number]]    POC Urinalysis Dipstick, Automated     Standing Status:   Future     Order Specific Question:   Release to patient     Answer:   Routine Release [[id number]]    CBC & Differential     Standing Status:   Future     Number of Occurrences:   1     Standing Expiration Date:   11/12/2025     Order Specific Question:   Manual Differential     Answer:   No     Order Specific Question:   Release to patient     Answer:   Routine Release [[id number]]       Return in about 6 months (around 5/11/2025) for Follow-up.

## 2024-11-12 LAB
ALBUMIN SERPL-MCNC: 4.1 G/DL (ref 3.5–5.2)
ALBUMIN/GLOB SERPL: 1.5 G/DL
ALP SERPL-CCNC: 53 U/L (ref 39–117)
ALT SERPL W P-5'-P-CCNC: 9 U/L (ref 1–33)
ANION GAP SERPL CALCULATED.3IONS-SCNC: 12.2 MMOL/L (ref 5–15)
AST SERPL-CCNC: 19 U/L (ref 1–32)
BILIRUB SERPL-MCNC: 0.3 MG/DL (ref 0–1.2)
BUN SERPL-MCNC: 19 MG/DL (ref 8–23)
BUN/CREAT SERPL: 20.7 (ref 7–25)
CALCIUM SPEC-SCNC: 9.2 MG/DL (ref 8.6–10.5)
CHLORIDE SERPL-SCNC: 98 MMOL/L (ref 98–107)
CHOLEST SERPL-MCNC: 214 MG/DL (ref 0–200)
CO2 SERPL-SCNC: 24.8 MMOL/L (ref 22–29)
CREAT SERPL-MCNC: 0.92 MG/DL (ref 0.57–1)
EGFRCR SERPLBLD CKD-EPI 2021: 63.9 ML/MIN/1.73
GLOBULIN UR ELPH-MCNC: 2.7 GM/DL
GLUCOSE SERPL-MCNC: 65 MG/DL (ref 65–99)
HDLC SERPL-MCNC: 80 MG/DL (ref 40–60)
LDLC SERPL CALC-MCNC: 119 MG/DL (ref 0–100)
LDLC/HDLC SERPL: 1.46 {RATIO}
POTASSIUM SERPL-SCNC: 4.2 MMOL/L (ref 3.5–5.2)
PROT SERPL-MCNC: 6.8 G/DL (ref 6–8.5)
SODIUM SERPL-SCNC: 135 MMOL/L (ref 136–145)
TRIGL SERPL-MCNC: 88 MG/DL (ref 0–150)
TSH SERPL DL<=0.05 MIU/L-ACNC: 2.37 UIU/ML (ref 0.27–4.2)
VLDLC SERPL-MCNC: 15 MG/DL (ref 5–40)

## 2024-11-13 LAB — BACTERIA SPEC AEROBE CULT: NO GROWTH

## 2024-11-29 DIAGNOSIS — J30.89 NON-SEASONAL ALLERGIC RHINITIS DUE TO OTHER ALLERGIC TRIGGER: ICD-10-CM

## 2024-12-02 DIAGNOSIS — M54.50 CHRONIC LOW BACK PAIN WITHOUT SCIATICA, UNSPECIFIED BACK PAIN LATERALITY: ICD-10-CM

## 2024-12-02 DIAGNOSIS — G89.29 CHRONIC LOW BACK PAIN WITHOUT SCIATICA, UNSPECIFIED BACK PAIN LATERALITY: ICD-10-CM

## 2024-12-02 RX ORDER — MONTELUKAST SODIUM 10 MG/1
10 TABLET ORAL NIGHTLY
Qty: 90 TABLET | Refills: 1 | Status: SHIPPED | OUTPATIENT
Start: 2024-12-02

## 2024-12-02 RX ORDER — TRAMADOL HYDROCHLORIDE 50 MG/1
50-100 TABLET ORAL EVERY 6 HOURS PRN
Qty: 240 TABLET | Refills: 0 | Status: SHIPPED | OUTPATIENT
Start: 2024-12-02

## 2025-01-07 DIAGNOSIS — G89.29 CHRONIC LOW BACK PAIN WITHOUT SCIATICA, UNSPECIFIED BACK PAIN LATERALITY: ICD-10-CM

## 2025-01-07 DIAGNOSIS — M54.50 CHRONIC LOW BACK PAIN WITHOUT SCIATICA, UNSPECIFIED BACK PAIN LATERALITY: ICD-10-CM

## 2025-01-07 RX ORDER — TRAMADOL HYDROCHLORIDE 50 MG/1
50-100 TABLET ORAL EVERY 6 HOURS PRN
Qty: 28 TABLET | Refills: 0 | Status: SHIPPED | OUTPATIENT
Start: 2025-01-07

## 2025-01-16 DIAGNOSIS — M54.50 CHRONIC LOW BACK PAIN WITHOUT SCIATICA, UNSPECIFIED BACK PAIN LATERALITY: ICD-10-CM

## 2025-01-16 DIAGNOSIS — G89.29 CHRONIC LOW BACK PAIN WITHOUT SCIATICA, UNSPECIFIED BACK PAIN LATERALITY: ICD-10-CM

## 2025-01-16 RX ORDER — TRAMADOL HYDROCHLORIDE 50 MG/1
50-100 TABLET ORAL EVERY 6 HOURS PRN
Qty: 240 TABLET | Refills: 2 | Status: SHIPPED | OUTPATIENT
Start: 2025-01-16

## 2025-01-16 NOTE — TELEPHONE ENCOUNTER
Caller: Brandi Duncan    Relationship: Self    Best call back number: [phone]     Requested Prescriptions:   Requested Prescriptions     Pending Prescriptions Disp Refills    traMADol (ULTRAM) 50 MG tablet 28 tablet 0     Sig: Take 1-2 tablets by mouth Every 6 (Six) Hours As Needed for Moderate Pain.        Pharmacy where request should be sent: [facility] [address] [place] [address] [phone] [facility] [phone]      Last office visit with prescribing clinician: 11/11/2024   Last telemedicine visit with prescribing clinician: Visit date not found   Next office visit with prescribing clinician: 5/15/2025     Additional details provided by patient: PATIENT STATES THAT THE LAST TIME THIS WAS FILLED SHE ONLY RECEIVED 28. SHE NORMALLY GETS 240. SHE WOULD LIKE THE REST OF HER PRESCRIPTION.    Does the patient have less than a 3 day supply:  [] Yes  [x] No    Would you like a call back once the refill request has been completed: [] Yes [x] No    If the office needs to give you a call back, can they leave a voicemail: [] Yes [x] No    Reinaldo Zambrano Rep   01/16/25 13:02 EST

## 2025-02-14 RX ORDER — RALOXIFENE HYDROCHLORIDE 60 MG/1
60 TABLET, FILM COATED ORAL DAILY
Qty: 90 TABLET | Refills: 1 | Status: SHIPPED | OUTPATIENT
Start: 2025-02-14

## 2025-02-27 DIAGNOSIS — J30.89 NON-SEASONAL ALLERGIC RHINITIS DUE TO OTHER ALLERGIC TRIGGER: ICD-10-CM

## 2025-02-27 RX ORDER — MONTELUKAST SODIUM 10 MG/1
10 TABLET ORAL NIGHTLY
Qty: 90 TABLET | Refills: 0 | Status: SHIPPED | OUTPATIENT
Start: 2025-02-27

## 2025-03-12 DIAGNOSIS — G89.29 CHRONIC MIDLINE LOW BACK PAIN WITHOUT SCIATICA: ICD-10-CM

## 2025-03-12 DIAGNOSIS — G89.29 CHRONIC MIDLINE LOW BACK PAIN WITHOUT SCIATICA: Primary | ICD-10-CM

## 2025-03-12 DIAGNOSIS — M54.50 CHRONIC MIDLINE LOW BACK PAIN WITHOUT SCIATICA: Primary | ICD-10-CM

## 2025-03-12 DIAGNOSIS — G89.29 CHRONIC LOW BACK PAIN WITHOUT SCIATICA, UNSPECIFIED BACK PAIN LATERALITY: ICD-10-CM

## 2025-03-12 DIAGNOSIS — M54.50 CHRONIC LOW BACK PAIN WITHOUT SCIATICA, UNSPECIFIED BACK PAIN LATERALITY: ICD-10-CM

## 2025-03-12 DIAGNOSIS — M54.50 CHRONIC MIDLINE LOW BACK PAIN WITHOUT SCIATICA: ICD-10-CM

## 2025-03-12 RX ORDER — GABAPENTIN 600 MG/1
600 TABLET, FILM COATED ORAL 4 TIMES DAILY
Qty: 90 TABLET
Start: 2025-03-12

## 2025-03-12 RX ORDER — TRAMADOL HYDROCHLORIDE 50 MG/1
50-100 TABLET ORAL EVERY 6 HOURS PRN
Qty: 240 TABLET | Refills: 2 | Status: SHIPPED | OUTPATIENT
Start: 2025-03-12

## 2025-03-12 RX ORDER — GABAPENTIN 300 MG/1
300 CAPSULE ORAL 4 TIMES DAILY
Qty: 120 CAPSULE | Refills: 2 | Status: SHIPPED | OUTPATIENT
Start: 2025-03-12

## 2025-03-12 RX ORDER — GABAPENTIN 100 MG/1
100 CAPSULE ORAL 4 TIMES DAILY
Qty: 120 CAPSULE | Refills: 2 | Status: SHIPPED | OUTPATIENT
Start: 2025-03-12

## 2025-03-12 NOTE — TELEPHONE ENCOUNTER
Last office visit (LOV) for chronic conditions 11-11-24  Next office visit (NOV) 5-15-25  Urine drug screen (UDS)  3-4-24  Controlled substance agreement (CSA)  3-4-24

## 2025-04-07 RX ORDER — PANTOPRAZOLE SODIUM 40 MG/1
40 TABLET, DELAYED RELEASE ORAL DAILY
Qty: 90 TABLET | Refills: 1 | Status: SHIPPED | OUTPATIENT
Start: 2025-04-07

## 2025-04-15 DIAGNOSIS — M54.50 CHRONIC LOW BACK PAIN WITHOUT SCIATICA, UNSPECIFIED BACK PAIN LATERALITY: ICD-10-CM

## 2025-04-15 DIAGNOSIS — G89.29 CHRONIC LOW BACK PAIN WITHOUT SCIATICA, UNSPECIFIED BACK PAIN LATERALITY: ICD-10-CM

## 2025-04-15 RX ORDER — TRAMADOL HYDROCHLORIDE 50 MG/1
50-100 TABLET ORAL EVERY 6 HOURS PRN
Qty: 240 TABLET | Refills: 2 | Status: SHIPPED | OUTPATIENT
Start: 2025-04-15

## 2025-04-29 ENCOUNTER — HOSPITAL ENCOUNTER (OUTPATIENT)
Dept: GENERAL RADIOLOGY | Facility: HOSPITAL | Age: 80
Discharge: HOME | End: 2025-04-29
Admitting: NURSE PRACTITIONER
Payer: MEDICARE

## 2025-04-29 ENCOUNTER — OFFICE VISIT (OUTPATIENT)
Dept: INTERNAL MEDICINE | Facility: CLINIC | Age: 80
End: 2025-04-29
Payer: MEDICARE

## 2025-04-29 ENCOUNTER — RESULTS FOLLOW-UP (OUTPATIENT)
Dept: INTERNAL MEDICINE | Facility: CLINIC | Age: 80
End: 2025-04-29

## 2025-04-29 VITALS
WEIGHT: 129.2 LBS | DIASTOLIC BLOOD PRESSURE: 66 MMHG | OXYGEN SATURATION: 95 % | HEART RATE: 81 BPM | TEMPERATURE: 97.3 F | SYSTOLIC BLOOD PRESSURE: 118 MMHG | BODY MASS INDEX: 25.23 KG/M2

## 2025-04-29 DIAGNOSIS — K59.09 OTHER CONSTIPATION: ICD-10-CM

## 2025-04-29 DIAGNOSIS — R07.81 RIB PAIN ON LEFT SIDE: ICD-10-CM

## 2025-04-29 DIAGNOSIS — R10.9 LEFT FLANK PAIN: ICD-10-CM

## 2025-04-29 DIAGNOSIS — K21.9 GASTROESOPHAGEAL REFLUX DISEASE WITHOUT ESOPHAGITIS: ICD-10-CM

## 2025-04-29 DIAGNOSIS — R82.998 LEUKOCYTES IN URINE: ICD-10-CM

## 2025-04-29 DIAGNOSIS — M41.85 LEVOSCOLIOSIS OF THORACOLUMBAR SPINE: ICD-10-CM

## 2025-04-29 DIAGNOSIS — R10.12 LEFT UPPER QUADRANT PAIN: Primary | ICD-10-CM

## 2025-04-29 LAB
BILIRUB BLD-MCNC: NEGATIVE MG/DL
CLARITY, POC: CLEAR
COLOR UR: YELLOW
EXPIRATION DATE: ABNORMAL
GLUCOSE UR STRIP-MCNC: NEGATIVE MG/DL
KETONES UR QL: ABNORMAL
LEUKOCYTE EST, POC: ABNORMAL
Lab: ABNORMAL
NITRITE UR-MCNC: NEGATIVE MG/ML
PH UR: 6 [PH] (ref 5–8)
PROT UR STRIP-MCNC: NEGATIVE MG/DL
RBC # UR STRIP: NEGATIVE /UL
SP GR UR: 1.01 (ref 1–1.03)
UROBILINOGEN UR QL: NORMAL

## 2025-04-29 PROCEDURE — 71046 X-RAY EXAM CHEST 2 VIEWS: CPT

## 2025-04-29 PROCEDURE — 87086 URINE CULTURE/COLONY COUNT: CPT | Performed by: NURSE PRACTITIONER

## 2025-04-29 PROCEDURE — 99214 OFFICE O/P EST MOD 30 MIN: CPT | Performed by: NURSE PRACTITIONER

## 2025-04-29 PROCEDURE — 1125F AMNT PAIN NOTED PAIN PRSNT: CPT | Performed by: NURSE PRACTITIONER

## 2025-04-29 PROCEDURE — 1160F RVW MEDS BY RX/DR IN RCRD: CPT | Performed by: NURSE PRACTITIONER

## 2025-04-29 PROCEDURE — 81003 URINALYSIS AUTO W/O SCOPE: CPT | Performed by: NURSE PRACTITIONER

## 2025-04-29 PROCEDURE — 1159F MED LIST DOCD IN RCRD: CPT | Performed by: NURSE PRACTITIONER

## 2025-04-29 PROCEDURE — 74018 RADEX ABDOMEN 1 VIEW: CPT

## 2025-04-29 RX ORDER — POLYETHYLENE GLYCOL 3350 17 G/17G
POWDER, FOR SOLUTION ORAL DAILY
COMMUNITY

## 2025-04-29 NOTE — PROGRESS NOTES
Patient Name: Brandi Duncan  : 1945   MRN: [id number]     Chief Complaint:    Chief Complaint   Patient presents with    Abdominal Pain     Left upper quadrant pain and bloating started 4/15. Hurts worse when pt is lying down. Dull aching pain      Breast Pain       History of Present Illness: Brandi Duncan is a 79 y.o. female.  History of Present Illness  The patient presents for evaluation of left upper quadrant pain, fibrocystic breast disease, chronic constipation, scoliosis, and acid reflux.    Left Upper Quadrant Pain  - The patient reports experiencing a deep, achy pain in her left upper quadrant, specifically between her hip and rib cage.  - This pain, which she describes as new, is not currently bothersome but intensifies after prolonged periods on her feet.  - The onset of this pain was approximately 2 weeks ago, following an exercise session.  - The intensity of the pain varies depending on her activities, ranging from mild discomfort to a more severe ache.  - She has not attempted to manage the pain with heat or ice.  - She has no history of kidney stones.  - She has a history of shingles, with previous outbreaks on her right upper back.  - She does not experience any unusual cough or shortness of breath.  - She occasionally experiences chest pain, which she attributes to gassiness and burping post-meal.  - She does not experience any burning sensation during urination or blood in her urine.  - She has not taken any anti-inflammatories for the pain.  - She is not currently on any blood thinners.  - She has a history of urinary tract infections but has not had any recent episodes since changing her pads.  - She has previously consulted with Dr. Fisher for urinary issues and was advised that her symptoms are age-related.  - The pain is somewhat alleviated when she sits, but it disrupts her sleep and is not relieved by changing positions.  - She takes tramadol every 6 hours for pain  management.    Chronic Constipation  - She manages chronic constipation with Benefiber and MiraLAX, although these treatments occasionally induce nausea.  - She relies on Fleet enemas for bowel movements, which she administers every other day.  - She has been managing this condition for several years and has noticed an increase in gas production.  - She has undergone pelvic floor therapy and performs colon massages, which provide some relief.  - Despite these interventions, she continues to struggle with bowel movements.  - She has sought treatment at the [facility] and has undergone a hemorrhoidectomy performed by Dr. Olivares.  - She believes that the trauma from the hemorrhoids and subsequent surgery may be contributing to her current symptoms.  - She consumes 2 tablespoons of fiber daily and takes Amitiza 24 twice a day.    Scoliosis  - She has a history of severe scoliosis, which has resulted in a loss of 5 inches in height due to worsening curvature.  - This condition causes significant pressure on her back, particularly when she wears a bra.  - She has undergone physical therapy, which has improved her stamina, but certain exercises seem to exacerbate her symptoms.  - She uses a rollator for support during activities such as shopping.    Fibrocystic Breast Disease  - She has been having a lot of trouble with fibrocystic disease in her breast.  - Her left breast has been really sore, and she is having trouble at night when she lays down.  - Between her breast and then this side hurting, it does not go away.  - She does not have any bumps in her breast.  - There is just a lot of thickness because of the fibrocystic disease.    Acid Reflux  - She has acid reflux.    Supplemental information: PAST SURGICAL HISTORY: Hemorrhoidectomy performed by Dr. Olivares.     Past Medical History:   Diagnosis Date    Anemia     Arthritis 2005    Cancer 1962    ovarian    Carpal tunnel syndrome     Cataract 2019    Chronic  idiopathic constipation     Colon polyp     small polyp removed during colonoscopy    Diverticulosis 2017    GERD (gastroesophageal reflux disease) 2013    Heart murmur 2019    Hemorrhoid     History of medical problems 2018    Neuropathy    HL (hearing loss) 2019    left ear    Low back pain 2000    Meniere's disease     left ear    Osteopenia 2005    Osteoporosis     Ovarian cancer     Peripheral neuropathy     Scoliosis     Urinary retention     Urinary tract infection 2024         Subjective     Review of System: Review of Systems     Medications:     Current Outpatient Medications:     AMITIZA 24 MCG capsule, Take 1 capsule by mouth 2 (Two) Times a Day., Disp: , Rfl:     B Complex Vitamins (VITAMIN B COMPLEX PO), Take 1 tablet by mouth Daily., Disp: , Rfl:     Calcium-Magnesium-Vitamin D (CALCIUM MAGNESIUM PO), Take  by mouth., Disp: , Rfl:     custom compounded cream, Apply  topically to the appropriate area as directed. CUSTOM COMPOUNDED RECTAL SUPPOSITORIES BELLADONNA/MORPHINE SULF 16.2/5MG  INSERT 1 SUPPOSITORY TWICE DAILY AS NEEDED, Disp: , Rfl:     custom compounded cream, Apply  topically to the appropriate area as directed. CUSTOM COMPOUNDED RECTAL SUPPOSITORY HYDROCORTISONE/LIDOCAINE 2.5% / 2% INSERT 1 SUPPOSITORY TWICE DAILY AS NEEDED, Disp: , Rfl:     diclofenac (VOLTAREN) 1 % gel gel, Apply four grams topically to affected areas four times a day as needed for pain., Disp: 100 g, Rfl: 1    Dietary Management Product (VASCULERA PO), Take 1 tablet by mouth 2 (Two) Times a Day., Disp: , Rfl:     Diphenhydramine-APAP, sleep, (TYLENOL PM EXTRA STRENGTH PO), Take 2 tablets by mouth At Night As Needed., Disp: , Rfl:     famotidine (PEPCID) 20 MG tablet, Take 1 tablet by mouth 2 (Two) Times a Day As Needed., Disp: , Rfl:     fluticasone (FLONASE) 50 MCG/ACT nasal spray, 2 sprays into the nostril(s) as directed by provider Daily. (Patient taking differently: Administer 2 sprays into the nostril(s) as directed  by provider Daily As Needed.), Disp: 16 g, Rfl: 3    gabapentin (NEURONTIN) 100 MG capsule, Take 1 capsule by mouth 4 (Four) Times a Day., Disp: 120 capsule, Rfl: 2    gabapentin (NEURONTIN) 300 MG capsule, Take 1 capsule by mouth 4 (Four) Times a Day., Disp: 120 capsule, Rfl: 2    Lidocaine, Anorectal, (LMX 5) 5 % cream cream, Apply  topically 4 (Four) Times a Day As Needed., Disp: , Rfl:     meclizine (ANTIVERT) 12.5 MG tablet, Take 1 tablet by mouth Every 6 (Six) Hours As Needed., Disp: , Rfl:     Melatonin 1 MG capsule, Take 5 mg by mouth As Needed., Disp: , Rfl:     methocarbamol (ROBAXIN) 500 MG tablet, Take 1 tablet by mouth 3 (Three) Times a Day., Disp: , Rfl:     montelukast (SINGULAIR) 10 MG tablet, Take 1 tablet by mouth Every Night., Disp: 90 tablet, Rfl: 0    pantoprazole (PROTONIX) 40 MG EC tablet, Take 1 tablet by mouth once daily, Disp: 90 tablet, Rfl: 1    polyethylene glycol (MIRALAX) 17 GM/SCOOP powder, Take  by mouth Daily., Disp: , Rfl:     polyethylene glycol (MIRALAX) powder, Take 17 g by mouth Daily As Needed (constipation)., Disp: , Rfl:     Probiotic Product (PROBIOTIC DAILY PO), Take 1 tablet by mouth Daily., Disp: , Rfl:     raloxifene (EVISTA) 60 MG tablet, TAKE 1 TABLET BY MOUTH DAILY, Disp: 90 tablet, Rfl: 1    Simethicone (GAS-X PO), Take  by mouth., Disp: , Rfl:     traMADol (ULTRAM) 50 MG tablet, Take 1-2 tablets by mouth Every 6 (Six) Hours As Needed for Moderate Pain., Disp: 240 tablet, Rfl: 2    triamterene-hydrochlorothiazide (DYAZIDE) 37.5-25 MG per capsule, Take 1 capsule by mouth Daily., Disp: , Rfl:     VITAMIN D, CHOLECALCIFEROL, PO, Take  by mouth Daily., Disp: , Rfl:     Wheat Dextrin (BENEFIBER DRINK MIX PO), Take  by mouth Daily., Disp: , Rfl:     azelastine (ASTELIN) 0.1 % nasal spray, 2 sprays into the nostril(s) as directed by provider Daily. Use in each nostril as directed (Patient not taking: Reported on 4/29/2025), Disp: 1 each, Rfl: 12    DHA-EPA-Vitamin E  (OMEGA-3 COMPLEX PO), Take 1 capsule by mouth Daily. (Patient not taking: Reported on 4/29/2025), Disp: , Rfl:     diazePAM (DIASTAT) 2.5 MG gel, Insert 5 mg into the rectum At Night As Needed. (Patient not taking: Reported on 4/29/2025), Disp: , Rfl:     Allergies:   Allergies   Allergen Reactions    Motrin [Ibuprofen] Nausea Only       Objective     Physical Exam:   Vital Signs:   Vitals:    04/29/25 1321   BP: 118/66   Pulse: 81   Temp: 97.3 °F (36.3 °C)   SpO2: 95%   Weight: 58.6 kg (129 lb 3.2 oz)   PainSc: 3    PainLoc: Abdomen           Physical Exam  Constitutional:       General: She is not in acute distress.     Appearance: She is not ill-appearing.   HENT:      Head: Normocephalic.   Cardiovascular:      Rate and Rhythm: Normal rate and regular rhythm.      Heart sounds: Normal heart sounds. No murmur heard.  Pulmonary:      Breath sounds: Normal breath sounds.   Abdominal:      General: Bowel sounds are normal.      Tenderness: There is no abdominal tenderness.   Musculoskeletal:      Comments: There is spinal curvature.  Palpation reveals tenderness and tight paraspinal muscles in       lumbar spine.  The back has decreased flexion, decreased extension, and pain with flexion and extension.     Neurological:      General: No focal deficit present.      Mental Status: She is oriented to person, place, and time.   Psychiatric:         Mood and Affect: Mood normal.     Physical Exam  Respiratory: Clear to auscultation, no wheezing, rales or rhonchi  Other: Tenderness over the left lateral ribs       Results  Imaging   - Colonoscopy: 2023, Normal   - Renal ultrasound: 06/2024, Normal kidneys and bladder     Assessment / Plan      Assessment/Plan:   Diagnoses and all orders for this visit:    1. Left upper quadrant pain (Primary)    2. Rib pain on left side  -     XR Chest PA & Lateral; Future    3. Left flank pain  -     POC Urinalysis Dipstick, Automated; Future  -     XR Chest PA & Lateral; Future  -      POC Urinalysis Dipstick, Automated    4. Levoscoliosis of thoracolumbar spine    5. Other constipation  -     XR Abdomen KUB; Future    6. Leukocytes in urine  -     Urine Culture - Urine, Urine, Clean Catch    7. Gastroesophageal reflux disease without esophagitis       Assessment & Plan  1. Left upper quadrant pain:  - Etiology could be multifactorial, involving chronic constipation, muscular issues, nerve pain, or scoliosis.  - Physical examination revealed tenderness over the left lateral ribs and between them, with pain extending deep into the muscle.  - Order chest x-ray to investigate potential rib or lung abnormalities.  - Conduct abdominal x-ray to assess for significant stool burden.  - Perform urine test to rule out hematuria or infection.  - Continue colon massage and maintain adequate hydration to alleviate constipation.  - Apply heat or ice to the affected area for relief.  - Take ibuprofen 200 mg 3 times daily for 4 to 5 days to reduce inflammation.  - Consider topical treatments like Voltaren gel or Salonpas patches if oral medication is not well-tolerated.    2. Fibrocystic breast disease:  - Reports soreness in breasts, particularly in the left breast, exacerbated at night.  - No new lumps or bumps noted during examination.  - Mammogram is due soon.  - Continued monitoring of breast symptoms advised.  - Report any new changes or concerns.  - Declined breast exam    3. Chronic constipation:  - Experiences chronic constipation, relies on Fleet enema every other day for bowel movements.  - Takes Benefiber, MiraLAX, and Amitiza 24 mcg twice a day.  - Difficulty passing stool and increased gassiness despite measures.  - Continue colon massage and ensure adequate fluid intake.  - If abdominal x-ray shows significant stool burden, a more thorough clean-out may be necessary.  - Further evaluation and management based on imaging results.    4. Scoliosis:  - Contributing to back pain and pressure, especially  when standing for long periods.  - Physical therapy has been beneficial in the past, encouraged to continue exercises that do not aggravate symptoms.  - Reports increased discomfort and pressure due to curvature worsening and height loss.  - Referral to physical therapy for further management and exercise guidance recommended.    5. Acid reflux:  - Experiences occasional chest pain likely related to acid reflux and gassiness.  - Follow reflux precautions, such as sitting upright after eating and avoiding food 2 to 3 hours before lying down.  - Monitoring and management of reflux symptoms with dietary and lifestyle modifications recommended.       Explained and discussed patient's condition and plan of care including labs and radiology that may be ordered.  Discussed when to follow-up.  Discussed possible red flags and how to follow-up with those.  Viewed patient's medications and discussed common side effects and symptoms to report. Patient to continue current medications as advised.  Be compliant with medications. Patient to call clinic if they have any worsening, no improvement, does not tolerate medication, or any future concerns about treatment.  Questions and concerns addressed at this appointment.  Patient to report to ER for emergencies.  Patient verbalized an understanding and agreement with plan of care.      Follow Up:   Keep next scheduled appointment  Patient or patient representative verbalized consent for the use of Ambient Listening during the visit with  ALIN Tracy for chart documentation. 5/1/2025  18:28 EDT    ALIN Tracy  [facility]

## 2025-04-30 LAB — BACTERIA SPEC AEROBE CULT: NO GROWTH

## 2025-04-30 NOTE — TELEPHONE ENCOUNTER
I left a message on the patients voicemail to call our office back, office number provided.     HUB relay:    Leukocytes were positive; which could be an infection; possibly contamination. I have sent a urine culture. If patient has dysuria let me know. Otherwise I will reach out once I have the culture results.

## 2025-05-15 ENCOUNTER — OFFICE VISIT (OUTPATIENT)
Dept: INTERNAL MEDICINE | Facility: CLINIC | Age: 80
End: 2025-05-15
Payer: MEDICARE

## 2025-05-15 VITALS
HEART RATE: 76 BPM | WEIGHT: 130 LBS | DIASTOLIC BLOOD PRESSURE: 68 MMHG | RESPIRATION RATE: 16 BRPM | TEMPERATURE: 98.4 F | BODY MASS INDEX: 25.39 KG/M2 | SYSTOLIC BLOOD PRESSURE: 116 MMHG

## 2025-05-15 DIAGNOSIS — K21.9 GASTROESOPHAGEAL REFLUX DISEASE WITHOUT ESOPHAGITIS: ICD-10-CM

## 2025-05-15 DIAGNOSIS — D50.9 IRON DEFICIENCY ANEMIA, UNSPECIFIED IRON DEFICIENCY ANEMIA TYPE: ICD-10-CM

## 2025-05-15 DIAGNOSIS — R53.83 FATIGUE, UNSPECIFIED TYPE: ICD-10-CM

## 2025-05-15 DIAGNOSIS — E78.5 HYPERLIPIDEMIA, UNSPECIFIED HYPERLIPIDEMIA TYPE: ICD-10-CM

## 2025-05-15 DIAGNOSIS — I10 ESSENTIAL HYPERTENSION: Primary | ICD-10-CM

## 2025-05-15 LAB
ALBUMIN SERPL-MCNC: 4.2 G/DL (ref 3.5–5.2)
ALBUMIN/GLOB SERPL: 1.4 G/DL
ALP SERPL-CCNC: 56 U/L (ref 39–117)
ALT SERPL W P-5'-P-CCNC: 11 U/L (ref 1–33)
ANION GAP SERPL CALCULATED.3IONS-SCNC: 13.1 MMOL/L (ref 5–15)
AST SERPL-CCNC: 23 U/L (ref 1–32)
BASOPHILS # BLD AUTO: 0.06 10*3/MM3 (ref 0–0.2)
BASOPHILS NFR BLD AUTO: 1.4 % (ref 0–1.5)
BILIRUB BLD-MCNC: NEGATIVE MG/DL
BILIRUB SERPL-MCNC: <0.2 MG/DL (ref 0–1.2)
BUN SERPL-MCNC: 21 MG/DL (ref 8–23)
BUN/CREAT SERPL: 20.2 (ref 7–25)
CALCIUM SPEC-SCNC: 9.1 MG/DL (ref 8.6–10.5)
CHLORIDE SERPL-SCNC: 98 MMOL/L (ref 98–107)
CHOLEST SERPL-MCNC: 186 MG/DL (ref 0–200)
CLARITY, POC: CLEAR
CO2 SERPL-SCNC: 25.9 MMOL/L (ref 22–29)
COLOR UR: YELLOW
CREAT SERPL-MCNC: 1.04 MG/DL (ref 0.57–1)
DEPRECATED RDW RBC AUTO: 39.7 FL (ref 37–54)
EGFRCR SERPLBLD CKD-EPI 2021: 54.8 ML/MIN/1.73
EOSINOPHIL # BLD AUTO: 0.09 10*3/MM3 (ref 0–0.4)
EOSINOPHIL NFR BLD AUTO: 2.2 % (ref 0.3–6.2)
ERYTHROCYTE [DISTWIDTH] IN BLOOD BY AUTOMATED COUNT: 13.3 % (ref 12.3–15.4)
EXPIRATION DATE: NORMAL
GLOBULIN UR ELPH-MCNC: 3.1 GM/DL
GLUCOSE SERPL-MCNC: 88 MG/DL (ref 65–99)
GLUCOSE UR STRIP-MCNC: NEGATIVE MG/DL
HCT VFR BLD AUTO: 30.1 % (ref 34–46.6)
HDLC SERPL-MCNC: 86 MG/DL (ref 40–60)
HGB BLD-MCNC: 9.9 G/DL (ref 12–15.9)
IMM GRANULOCYTES # BLD AUTO: 0.01 10*3/MM3 (ref 0–0.05)
IMM GRANULOCYTES NFR BLD AUTO: 0.2 % (ref 0–0.5)
IRON 24H UR-MRATE: 66 MCG/DL (ref 37–145)
IRON SATN MFR SERPL: 14 % (ref 20–50)
KETONES UR QL: NEGATIVE
LDLC SERPL CALC-MCNC: 92 MG/DL (ref 0–100)
LDLC/HDLC SERPL: 1.07 {RATIO}
LEUKOCYTE EST, POC: NEGATIVE
LYMPHOCYTES # BLD AUTO: 1.22 10*3/MM3 (ref 0.7–3.1)
LYMPHOCYTES NFR BLD AUTO: 29.2 % (ref 19.6–45.3)
Lab: NORMAL
MCH RBC QN AUTO: 27.2 PG (ref 26.6–33)
MCHC RBC AUTO-ENTMCNC: 32.9 G/DL (ref 31.5–35.7)
MCV RBC AUTO: 82.7 FL (ref 79–97)
MONOCYTES # BLD AUTO: 0.49 10*3/MM3 (ref 0.1–0.9)
MONOCYTES NFR BLD AUTO: 11.7 % (ref 5–12)
NEUTROPHILS NFR BLD AUTO: 2.31 10*3/MM3 (ref 1.7–7)
NEUTROPHILS NFR BLD AUTO: 55.3 % (ref 42.7–76)
NITRITE UR-MCNC: NEGATIVE MG/ML
NRBC BLD AUTO-RTO: 0 /100 WBC (ref 0–0.2)
PH UR: 7 [PH] (ref 5–8)
PLATELET # BLD AUTO: 332 10*3/MM3 (ref 140–450)
PMV BLD AUTO: 9.4 FL (ref 6–12)
POTASSIUM SERPL-SCNC: 4.2 MMOL/L (ref 3.5–5.2)
PROT SERPL-MCNC: 7.3 G/DL (ref 6–8.5)
PROT UR STRIP-MCNC: NEGATIVE MG/DL
RBC # BLD AUTO: 3.64 10*6/MM3 (ref 3.77–5.28)
RBC # UR STRIP: NEGATIVE /UL
SODIUM SERPL-SCNC: 137 MMOL/L (ref 136–145)
SP GR UR: 1 (ref 1–1.03)
TIBC SERPL-MCNC: 474 MCG/DL (ref 298–536)
TRANSFERRIN SERPL-MCNC: 318 MG/DL (ref 200–360)
TRIGL SERPL-MCNC: 39 MG/DL (ref 0–150)
UROBILINOGEN UR QL: NORMAL
VLDLC SERPL-MCNC: 8 MG/DL (ref 5–40)
WBC NRBC COR # BLD AUTO: 4.18 10*3/MM3 (ref 3.4–10.8)

## 2025-05-15 PROCEDURE — 82728 ASSAY OF FERRITIN: CPT | Performed by: INTERNAL MEDICINE

## 2025-05-15 PROCEDURE — 84466 ASSAY OF TRANSFERRIN: CPT | Performed by: INTERNAL MEDICINE

## 2025-05-15 PROCEDURE — 99214 OFFICE O/P EST MOD 30 MIN: CPT | Performed by: INTERNAL MEDICINE

## 2025-05-15 PROCEDURE — 1125F AMNT PAIN NOTED PAIN PRSNT: CPT | Performed by: INTERNAL MEDICINE

## 2025-05-15 PROCEDURE — 36415 COLL VENOUS BLD VENIPUNCTURE: CPT | Performed by: INTERNAL MEDICINE

## 2025-05-15 PROCEDURE — 83540 ASSAY OF IRON: CPT | Performed by: INTERNAL MEDICINE

## 2025-05-15 PROCEDURE — 80061 LIPID PANEL: CPT | Performed by: INTERNAL MEDICINE

## 2025-05-15 PROCEDURE — 80053 COMPREHEN METABOLIC PANEL: CPT | Performed by: INTERNAL MEDICINE

## 2025-05-15 PROCEDURE — 81003 URINALYSIS AUTO W/O SCOPE: CPT | Performed by: INTERNAL MEDICINE

## 2025-05-15 PROCEDURE — 84443 ASSAY THYROID STIM HORMONE: CPT | Performed by: INTERNAL MEDICINE

## 2025-05-15 PROCEDURE — 85025 COMPLETE CBC W/AUTO DIFF WBC: CPT | Performed by: INTERNAL MEDICINE

## 2025-05-15 RX ORDER — HYDROXYCHLOROQUINE SULFATE 200 MG/1
200 TABLET, FILM COATED ORAL 2 TIMES DAILY
COMMUNITY
Start: 2025-05-01

## 2025-05-15 RX ORDER — UREA 10 %
1 LOTION (ML) TOPICAL DAILY
COMMUNITY
Start: 2025-05-05

## 2025-05-16 LAB
FERRITIN SERPL-MCNC: 19.7 NG/ML (ref 13–150)
TSH SERPL DL<=0.05 MIU/L-ACNC: 2.55 UIU/ML (ref 0.27–4.2)

## 2025-05-26 NOTE — PROGRESS NOTES
Chief Complaint   Patient presents with    Follow-up     6 month follow up chronic medical problems       History of Present Illness      The patient presents for a follow-up related to hyperlipidemia. She is following a low fat diet. She reports that she is exercising. She is not taking medication for hyperlipidemia. She denies chest pain, shortness of breath, orthopnea, paroxysmal nocturnal dyspnea, dyspnea on exertion, edema, palpitations or syncope.    The patient presents for a follow-up related to hypertension. The patient reports that she has had no headaches or blurred vision. She states that she is taking her medication as prescribed. She is not having medication side effects.    The patient presents for a follow-up related to iron deficiency anemia. There are no reports of blood loss. The patient has fatigue but does not have a dry cough, a wet cough, wheezing, fever, nausea, vomiting, diarrhea, myalgias, abdominal pain, sweats, weight loss, decreased appetite or chills. The patient's energy level is decreased.    The patient has complaints of being fatigued. She describes her symptoms as decreased energy and the symptoms have been present for three months.       She denies tearfulness, loneliness, hopelessness, anxiety or suicidal thoughts.       The patient denies facial pain, eye drainage, ear pain, ear drainage, sore throat, nasal discharge, rectal bleeding or night sweats. There are no symptoms of heat intolerance, cold intolerance, excessive hair loss or constipation. The patient denies polyuria, polydypsia or polyphagia. There are no known exposures to mononucleosis. The patient does exercise regularly.    The patient falls asleep easily and typically does not awaken at night. The patient does not snore and denies gasping or stopping breathing at night. She feels well rested during the day and she denies falling asleep while watching television, while driving or during conversation.         The  patient presents for a follow-up related to GERD. The patient is on pantoprazole for her gastroesophageal reflux. The medication is taken on a regular basis and gives complete relief of the symptoms. She reports no belching, dysphagia, early satiety, heartburn, hoarseness or odynophagia. The GERD has no known aggravating factors.    Medications      Current Outpatient Medications:     AMITIZA 24 MCG capsule, Take 1 capsule by mouth 2 (Two) Times a Day., Disp: , Rfl:     azelastine (ASTELIN) 0.1 % nasal spray, 2 sprays into the nostril(s) as directed by provider Daily. Use in each nostril as directed (Patient taking differently: Administer 2 sprays into the nostril(s) as directed by provider Daily As Needed. Use in each nostril as directed), Disp: 1 each, Rfl: 12    B Complex Vitamins (VITAMIN B COMPLEX PO), Take 1 tablet by mouth Daily., Disp: , Rfl:     Calcium-Magnesium-Vitamin D (CALCIUM MAGNESIUM PO), Take  by mouth., Disp: , Rfl:     custom compounded cream, Apply  topically to the appropriate area as directed. CUSTOM COMPOUNDED RECTAL SUPPOSITORIES BELLADONNA/MORPHINE SULF 16.2/5MG  INSERT 1 SUPPOSITORY TWICE DAILY AS NEEDED, Disp: , Rfl:     custom compounded cream, Apply  topically to the appropriate area as directed. CUSTOM COMPOUNDED RECTAL SUPPOSITORY HYDROCORTISONE/LIDOCAINE 2.5% / 2% INSERT 1 SUPPOSITORY TWICE DAILY AS NEEDED, Disp: , Rfl:     DHA-EPA-Vitamin E (OMEGA-3 COMPLEX PO), Take 1 capsule by mouth Daily., Disp: , Rfl:     diclofenac (VOLTAREN) 1 % gel gel, Apply four grams topically to affected areas four times a day as needed for pain., Disp: 100 g, Rfl: 1    Dietary Management Product (VASCULERA PO), Take 1 tablet by mouth 2 (Two) Times a Day., Disp: , Rfl:     Diphenhydramine-APAP, sleep, (TYLENOL PM EXTRA STRENGTH PO), Take 2 tablets by mouth At Night As Needed., Disp: , Rfl:     famotidine (PEPCID) 20 MG tablet, Take 1 tablet by mouth 2 (Two) Times a Day As Needed., Disp: , Rfl:      fluticasone (FLONASE) 50 MCG/ACT nasal spray, 2 sprays into the nostril(s) as directed by provider Daily. (Patient taking differently: Administer 2 sprays into the nostril(s) as directed by provider Daily As Needed.), Disp: 16 g, Rfl: 3    gabapentin (NEURONTIN) 100 MG capsule, Take 1 capsule by mouth 4 (Four) Times a Day., Disp: 120 capsule, Rfl: 2    gabapentin (NEURONTIN) 300 MG capsule, Take 1 capsule by mouth 4 (Four) Times a Day., Disp: 120 capsule, Rfl: 2    hydroxychloroquine (PLAQUENIL) 200 MG tablet, Take 1 tablet by mouth 2 (Two) Times a Day., Disp: , Rfl:     Iron Slow Release 45 MG tablet controlled-release, Take 1 tablet by mouth Daily., Disp: , Rfl:     Lidocaine, Anorectal, (LMX 5) 5 % cream cream, Apply  topically 4 (Four) Times a Day As Needed., Disp: , Rfl:     meclizine (ANTIVERT) 12.5 MG tablet, Take 1 tablet by mouth Every 6 (Six) Hours As Needed., Disp: , Rfl:     Melatonin 1 MG capsule, Take 5 mg by mouth As Needed., Disp: , Rfl:     montelukast (SINGULAIR) 10 MG tablet, Take 1 tablet by mouth Every Night., Disp: 90 tablet, Rfl: 0    pantoprazole (PROTONIX) 40 MG EC tablet, Take 1 tablet by mouth once daily, Disp: 90 tablet, Rfl: 1    polyethylene glycol (MIRALAX) 17 GM/SCOOP powder, Take  by mouth Daily., Disp: , Rfl:     Probiotic Product (PROBIOTIC DAILY PO), Take 1 tablet by mouth Daily., Disp: , Rfl:     raloxifene (EVISTA) 60 MG tablet, TAKE 1 TABLET BY MOUTH DAILY, Disp: 90 tablet, Rfl: 1    Simethicone (GAS-X PO), Take  by mouth., Disp: , Rfl:     traMADol (ULTRAM) 50 MG tablet, Take 1-2 tablets by mouth Every 6 (Six) Hours As Needed for Moderate Pain., Disp: 240 tablet, Rfl: 2    triamterene-hydrochlorothiazide (DYAZIDE) 37.5-25 MG per capsule, Take 1 capsule by mouth Daily., Disp: , Rfl:     Wheat Dextrin (BENEFIBER DRINK MIX PO), Take  by mouth Daily., Disp: , Rfl:     diazePAM (DIASTAT) 2.5 MG gel, Insert 5 mg into the rectum At Night As Needed. (Patient not taking: Reported on  5/15/2025), Disp: , Rfl:      Allergies    Allergies   Allergen Reactions    Motrin [Ibuprofen] Nausea Only       Problem List    Patient Active Problem List   Diagnosis    Rectal pain    S/P hemorrhoidectomy 11/1/16    Urinary retention    Acute urinary retention    Chronic low back pain    Osteopenia of neck of femur    Heart murmur       Medications, Allergies, Problems List and Past History were reviewed and updated.    Physical Examination    /68 (BP Location: Left arm, Patient Position: Sitting, Cuff Size: Adult)   Pulse 76   Temp 98.4 °F (36.9 °C) (Infrared)   Resp 16   Wt 59 kg (130 lb)   LMP  (LMP Unknown)   BMI 25.39 kg/m²       HEENT: Head- Normocephalic Atraumatic. Facies- Within normal limits. Pinnas- Normal texture and shape bilaterally. Canals- Normal bilaterally. TMs- Normal bilaterally. Nares- Patent bilaterally. Nasal Septum- is normal. There is no tenderness to palpation over the frontal or maxillary sinuses. Lids- Normal bilaterally. Conjunctiva- Clear bilaterally. Sclera- Anicteric bilaterally. Oropharynx- Moist with no lesions. Tonsils- No enlargement, erythema or exudate.    Neck: Thyroid- non enlarged, symmetric and has no nodules. No bruits are detected. ROM- Normal Range of Motion with no rigidity.    Lungs: Auscultation- Clear to auscultation bilaterally. There are no retractions, clubbing or cyanosis. The Expiratory to Inspiratory ratio is equal.    Lymph Nodes: Cervical- no enlarged lymph nodes noted. Clavicular- Deferred. Axillary- Deferred. Inguinal- Deferred.    Cardiovascular: There are no carotid bruits. Heart- Normal Rate with Regular rhythm and no murmurs. There are no gallops. There are no rubs. In the lower extremities there is no edema. The upper extremities do not have edema.      Abdomen: Soft, benign, non-tender with no masses, hernias, organomegaly or scars.    Impression and Assessment    Hyperlipidemia.    Essential Hypertension.    Iron Deficiency  Anemia.    Fatigue.    Gastroesophageal Reflux Disease.    Plan    Gastroesophageal Reflux Disease Plan: The patient was instructed to continue the current medications.    Hyperlipidemia Plan: The patient was instructed to exercise daily and eat a low fat diet.    Essential Hypertension Plan: The patient was instructed to continue the current medications.    Fatigue Plan: Further plans will be made after test results are received and reviewed.    Iron Deficiency Anemia Plan: The patient was instructed to continue the current medications. Further plans will be made following results of testing.    Diagnoses and all orders for this visit:    1. Essential hypertension (Primary)  -     CBC & Differential; Future  -     Comprehensive Metabolic Panel; Future  -     TSH; Future  -     POC Urinalysis Dipstick, Automated; Future  -     POC Urinalysis Dipstick, Automated  -     CBC & Differential  -     Comprehensive Metabolic Panel  -     TSH    2. Hyperlipidemia, unspecified hyperlipidemia type  -     Comprehensive Metabolic Panel; Future  -     Lipid Panel; Future  -     Comprehensive Metabolic Panel  -     Lipid Panel    3. Gastroesophageal reflux disease without esophagitis    4. Fatigue, unspecified type  -     CBC & Differential; Future  -     Comprehensive Metabolic Panel; Future  -     TSH; Future  -     CBC & Differential  -     Comprehensive Metabolic Panel  -     TSH    5. Iron deficiency anemia, unspecified iron deficiency anemia type  -     CBC & Differential; Future  -     Ferritin; Future  -     Iron Profile; Future  -     CBC & Differential  -     Ferritin  -     Iron Profile            Return to Office    The patient was instructed to return for follow-up in 6 months. The patient was instructed to return sooner if the condition changes, worsens, or does not resolve.

## 2025-06-16 DIAGNOSIS — M54.50 CHRONIC LOW BACK PAIN WITHOUT SCIATICA, UNSPECIFIED BACK PAIN LATERALITY: ICD-10-CM

## 2025-06-16 DIAGNOSIS — G89.29 CHRONIC LOW BACK PAIN WITHOUT SCIATICA, UNSPECIFIED BACK PAIN LATERALITY: ICD-10-CM

## 2025-06-16 RX ORDER — TRAMADOL HYDROCHLORIDE 50 MG/1
50-100 TABLET ORAL EVERY 6 HOURS PRN
Qty: 240 TABLET | Refills: 2 | Status: SHIPPED | OUTPATIENT
Start: 2025-06-16

## 2025-06-16 NOTE — TELEPHONE ENCOUNTER
Last office visit (LOV) for chronic condition 5/15/2025  Next office visit (NOV) 11/13/2025  UDS 3/4/2024  CSA 3/4/2024

## 2025-06-24 ENCOUNTER — TRANSCRIBE ORDERS (OUTPATIENT)
Dept: ADMINISTRATIVE | Facility: HOSPITAL | Age: 80
End: 2025-06-24
Payer: MEDICARE

## 2025-06-24 DIAGNOSIS — Z12.31 VISIT FOR SCREENING MAMMOGRAM: Primary | ICD-10-CM

## 2025-07-15 DIAGNOSIS — G89.29 CHRONIC LOW BACK PAIN WITHOUT SCIATICA, UNSPECIFIED BACK PAIN LATERALITY: ICD-10-CM

## 2025-07-15 DIAGNOSIS — M54.50 CHRONIC LOW BACK PAIN WITHOUT SCIATICA, UNSPECIFIED BACK PAIN LATERALITY: ICD-10-CM

## 2025-07-15 RX ORDER — TRAMADOL HYDROCHLORIDE 50 MG/1
50-100 TABLET ORAL EVERY 6 HOURS PRN
Qty: 240 TABLET | Refills: 2 | Status: SHIPPED | OUTPATIENT
Start: 2025-07-15

## 2025-08-14 DIAGNOSIS — M54.50 CHRONIC MIDLINE LOW BACK PAIN WITHOUT SCIATICA: ICD-10-CM

## 2025-08-14 DIAGNOSIS — G89.29 CHRONIC MIDLINE LOW BACK PAIN WITHOUT SCIATICA: ICD-10-CM

## 2025-08-14 RX ORDER — TRIAMTERENE AND HYDROCHLOROTHIAZIDE 37.5; 25 MG/1; MG/1
1 CAPSULE ORAL DAILY
Qty: 30 CAPSULE | Refills: 2 | Status: SHIPPED | OUTPATIENT
Start: 2025-08-14

## 2025-08-15 DIAGNOSIS — M54.50 CHRONIC LOW BACK PAIN WITHOUT SCIATICA, UNSPECIFIED BACK PAIN LATERALITY: ICD-10-CM

## 2025-08-15 DIAGNOSIS — G89.29 CHRONIC LOW BACK PAIN WITHOUT SCIATICA, UNSPECIFIED BACK PAIN LATERALITY: ICD-10-CM

## 2025-08-15 RX ORDER — GABAPENTIN 100 MG/1
100 CAPSULE ORAL 4 TIMES DAILY
Qty: 120 CAPSULE | Refills: 2 | Status: SHIPPED | OUTPATIENT
Start: 2025-08-15

## 2025-08-15 RX ORDER — GABAPENTIN 300 MG/1
300 CAPSULE ORAL 4 TIMES DAILY
Qty: 120 CAPSULE | Refills: 2 | Status: SHIPPED | OUTPATIENT
Start: 2025-08-15

## 2025-08-17 RX ORDER — TRAMADOL HYDROCHLORIDE 50 MG/1
50-100 TABLET ORAL EVERY 6 HOURS PRN
Qty: 240 TABLET | Refills: 2 | Status: SHIPPED | OUTPATIENT
Start: 2025-08-17

## 2025-08-25 ENCOUNTER — OFFICE VISIT (OUTPATIENT)
Dept: INTERNAL MEDICINE | Facility: CLINIC | Age: 80
End: 2025-08-25
Payer: MEDICARE

## 2025-08-25 VITALS
RESPIRATION RATE: 16 BRPM | WEIGHT: 128.5 LBS | SYSTOLIC BLOOD PRESSURE: 110 MMHG | HEART RATE: 88 BPM | TEMPERATURE: 98.4 F | DIASTOLIC BLOOD PRESSURE: 54 MMHG | BODY MASS INDEX: 25.1 KG/M2

## 2025-08-25 DIAGNOSIS — N30.00 ACUTE CYSTITIS WITHOUT HEMATURIA: ICD-10-CM

## 2025-08-25 DIAGNOSIS — R30.0 BURNING WITH URINATION: Primary | ICD-10-CM

## 2025-08-25 LAB
BILIRUB BLD-MCNC: NEGATIVE MG/DL
CLARITY, POC: CLEAR
COLOR UR: YELLOW
EXPIRATION DATE: NORMAL
GLUCOSE UR STRIP-MCNC: NEGATIVE MG/DL
KETONES UR QL: NEGATIVE
LEUKOCYTE EST, POC: NEGATIVE
Lab: NORMAL
NITRITE UR-MCNC: NEGATIVE MG/ML
PH UR: 6.5 [PH] (ref 5–8)
PROT UR STRIP-MCNC: NEGATIVE MG/DL
RBC # UR STRIP: NEGATIVE /UL
SP GR UR: 1.01 (ref 1–1.03)
UROBILINOGEN UR QL: NORMAL

## 2025-08-25 PROCEDURE — 1126F AMNT PAIN NOTED NONE PRSNT: CPT | Performed by: INTERNAL MEDICINE

## 2025-08-25 PROCEDURE — 99214 OFFICE O/P EST MOD 30 MIN: CPT | Performed by: INTERNAL MEDICINE

## 2025-08-25 PROCEDURE — 81003 URINALYSIS AUTO W/O SCOPE: CPT | Performed by: INTERNAL MEDICINE

## 2025-08-25 RX ORDER — SULFAMETHOXAZOLE AND TRIMETHOPRIM 800; 160 MG/1; MG/1
1 TABLET ORAL 2 TIMES DAILY
Qty: 14 TABLET | Refills: 0 | Status: SHIPPED | OUTPATIENT
Start: 2025-08-25 | End: 2025-09-01